# Patient Record
Sex: MALE | Race: BLACK OR AFRICAN AMERICAN | Employment: UNEMPLOYED | URBAN - METROPOLITAN AREA
[De-identification: names, ages, dates, MRNs, and addresses within clinical notes are randomized per-mention and may not be internally consistent; named-entity substitution may affect disease eponyms.]

---

## 2017-01-05 ENCOUNTER — TELEPHONE (OUTPATIENT)
Dept: FAMILY MEDICINE | Facility: CLINIC | Age: 52
End: 2017-01-05

## 2017-01-05 DIAGNOSIS — E11.9 DIABETES MELLITUS TYPE 2, INSULIN DEPENDENT (H): Primary | ICD-10-CM

## 2017-01-05 DIAGNOSIS — Z79.4 DIABETES MELLITUS TYPE 2, INSULIN DEPENDENT (H): Primary | ICD-10-CM

## 2017-01-05 NOTE — TELEPHONE ENCOUNTER
[employer] plans no longer cover Lantus Solostar or vials. They now cover Tresiba or Basaglar Insulins. Please, send new rx for one of the covered medications.    Thank You  Augustina Frey CPhT, [facility]  [phone]  [email or web address]

## 2017-01-06 RX ORDER — INSULIN GLARGINE 100 [IU]/ML
24 INJECTION, SOLUTION SUBCUTANEOUS AT BEDTIME
Qty: 3 ML | Refills: 3 | Status: SHIPPED
Start: 2017-01-06 | End: 2017-03-21

## 2017-01-07 NOTE — TELEPHONE ENCOUNTER
Insulin glargine (solostar) not covered by insurance anymore. Sending a new prescription for Basaglar.    Eloina Garcia MD  [facility],  G2 Family Medicine Resident  #[id number]

## 2017-01-24 ENCOUNTER — OFFICE VISIT (OUTPATIENT)
Dept: FAMILY MEDICINE | Facility: CLINIC | Age: 52
End: 2017-01-24

## 2017-01-24 VITALS
BODY MASS INDEX: 31.14 KG/M2 | RESPIRATION RATE: 18 BRPM | DIASTOLIC BLOOD PRESSURE: 73 MMHG | TEMPERATURE: 98 F | OXYGEN SATURATION: 98 % | HEART RATE: 101 BPM | WEIGHT: 217 LBS | SYSTOLIC BLOOD PRESSURE: 115 MMHG

## 2017-01-24 DIAGNOSIS — L02.818 CUTANEOUS ABSCESS OF OTHER SITE: Primary | ICD-10-CM

## 2017-01-24 RX ORDER — DIPHENHYDRAMINE HCL/ZINC ACET 2 %-0.1 %
1 AEROSOL, SPRAY (GRAM) TOPICAL 2 TIMES DAILY
Qty: 40 CAPSULE | Refills: 0 | Status: SHIPPED | OUTPATIENT
Start: 2017-01-24 | End: 2017-02-13

## 2017-01-24 RX ORDER — CEPHALEXIN 500 MG/1
500 CAPSULE ORAL 3 TIMES DAILY
Qty: 30 CAPSULE | Refills: 0 | Status: SHIPPED | OUTPATIENT
Start: 2017-01-24 | End: 2019-07-01

## 2017-01-24 NOTE — PROGRESS NOTES
HPI:       Don Niño is a 52 year old who presents for the following  Patient presents with:  Diabetes: F/u        Concern: Cyst    Description of the problem :pt presents today with a cyst located on the LRQ of stomach, pt states that he has experienced drainage of puss and blood, warm to touch     When did it start?: 2 months ago     Intensity: mild, moderate    Progression of Symptoms:  same    Therapies Tried hot towel    What has worked?  The hot towel has helped with the bump. No antibiotic so far.     He believes it was provoked by an insulin injection.           , Diabetes Follow-up    Patient is checking blood sugars: three times daily. Results are as follows:         am - 124-130         lunchtime - 180-200              postprandial after supper- 140-160         -Last A1C was A1C      8.0   12/6/2016  A1C      8.8   8/17/2016  A1C      8.8   5/11/2016  A1C      8.2   10/16/2015  A1C      7.4   4/3/2015     Diabetic concerns: None    Chest Pain or exercise related calf pain (claudication):no     Symptoms of hypoglycemia (low blood sugar): dizzy, weak, lethargy, confusion     Paresthesias (numbness or burning in feet) or sores: No   Diabetic eye exam within the last year?: Yes          Problem, Medication and Allergy Lists were reviewed and are current.  Patient is an established patient of this clinic.         Review of Systems:   Review of Systems   All other systems reviewed and are negative.            Physical Exam:   Patient Vitals for the past 24 hrs:   BP Temp Temp src Pulse Resp SpO2 Weight   01/24/17 1058 115/73 mmHg 98  F (36.7  C) Oral 101 18 98 % 217 lb (98.431 kg)     Body mass index is 31.14 kg/(m^2).  Vitals were reviewed and were normal  Blood pressure 115/73, pulse 101, temperature 98  F (36.7  C), temperature source Oral, resp. rate 18, weight 217 lb (98.431 kg), SpO2 98 %.     Physical Exam   Constitutional: He appears well-developed and well-nourished. No distress.    Cardiovascular: Normal rate, regular rhythm and normal heart sounds.    No murmur heard.  Pulmonary/Chest: Effort normal and breath sounds normal. He has no wheezes. He has no rales.   Abdominal:             Results:   None.   Assessment and Plan   1. Subcutaneous abscess, likely 2/2 infected insulin injection.   - use warm compresses as much as possible to provoke sponateous abscess drainage.   - Start cephALEXin (KEFLEX) 500 MG capsule; Take 1 capsule (500 mg) by mouth 3 times daily  Dispense: 30 capsule; Refill: 0  - Start Lactobacillus Rhamnosus, GG, (CVS PROBIOTIC, LACTOBACILLUS,) CAPS; Take 1 capsule by mouth 2 times daily for 20 days  Dispense: 40 capsule; Refill: 0  - follow up in one week.   ------------------------------------------------------------------------------------------  T2DM: controlled, continue current treatment.   Basal: glargine 24 units plus metformin 2000 mg daily.   Prandial: Novolo units with each meal plus 5 units before snacks at night.   ----  BP: Controlled : cont lisinopril 5 mg daily/metoprolol 50 mg /day.   ----  Lipids: Atorvastatin 80 mg daily.       There are no discontinued medications.  Options for treatment and follow-up care were reviewed with the patient. Don Niño  engaged in the decision making process and verbalized understanding of the options discussed and agreed with the final plan.    Jason Hightower MD

## 2017-01-24 NOTE — PATIENT INSTRUCTIONS
Here is the plan from today's visit    1. Cutaneous abscess of other site  - use warm compresses  - cephALEXin (KEFLEX) 500 MG capsule; Take 1 capsule (500 mg) by mouth 3 times daily  Dispense: 30 capsule; Refill: 0  - Lactobacillus Rhamnosus, GG, (CVS PROBIOTIC, LACTOBACILLUS,) CAPS; Take 1 capsule by mouth 2 times daily for 20 days  Dispense: 40 capsule; Refill: 0          Please call or return to clinic if your symptoms don't go away.    Follow up plan      Thank you for coming to [facility] today.  Lab Testing:  **If you had lab testing today and your results are reassuring or normal they will be mailed to you or sent through [employer] within 7 days.   **If the lab tests need quick action we will call you with the results.  The phone number we will call with results is # [phone] (home) . If this is not the best number please call our clinic and change the number.  Medication Refills:  If you need any refills please call your pharmacy and they will contact us.   If you need to  your refill at a new pharmacy, please contact the new pharmacy directly. The new pharmacy will help you get your medications transferred faster.   Scheduling:  If you have any concerns about today's visit or wish to schedule another appointment please call our office during normal business hours [phone] (8-5:00 M-F)  If a referral was made to a [facility] and you don't get a call from central scheduling please call [phone].  If a Mammogram was ordered for you at [facility] call [phone] to schedule or change your appointment.  If you had an XRay/CT/Ultrasound/MRI ordered the number is [phone] to schedule or change your radiology appointment.   Medical Concerns:  If you have urgent medical concerns please call [phone] at any time of the day.  If you have a medical emergency please call 112.

## 2017-01-24 NOTE — MR AVS SNAPSHOT
After Visit Summary   1/24/2017    Don Niño    MRN: [id number]           Patient Information     Date Of Birth          1965        Visit Information        Provider Department      1/24/2017 10:40 AM Jason Hightower MD [facility]        Today's Diagnoses     Cutaneous abscess of other site    -  1       Care Instructions    Here is the plan from today's visit    1. Cutaneous abscess of other site  - use warm compresses  - cephALEXin (KEFLEX) 500 MG capsule; Take 1 capsule (500 mg) by mouth 3 times daily  Dispense: 30 capsule; Refill: 0  - Lactobacillus Rhamnosus, GG, (CVS PROBIOTIC, LACTOBACILLUS,) CAPS; Take 1 capsule by mouth 2 times daily for 20 days  Dispense: 40 capsule; Refill: 0          Please call or return to clinic if your symptoms don't go away.    Follow up plan      Thank you for coming to [facility] today.  Lab Testing:  **If you had lab testing today and your results are reassuring or normal they will be mailed to you or sent through [employer] within 7 days.   **If the lab tests need quick action we will call you with the results.  The phone number we will call with results is # [phone] (home) . If this is not the best number please call our clinic and change the number.  Medication Refills:  If you need any refills please call your pharmacy and they will contact us.   If you need to  your refill at a new pharmacy, please contact the new pharmacy directly. The new pharmacy will help you get your medications transferred faster.   Scheduling:  If you have any concerns about today's visit or wish to schedule another appointment please call our office during normal business hours [phone] (8-5:00 M-F)  If a referral was made to a [facility] and you don't get a call from central scheduling please call [phone].  If a Mammogram was ordered for you at [facility] call [phone] to schedule or change  your appointment.  If you had an XRay/CT/Ultrasound/MRI ordered the number is [phone] to schedule or change your radiology appointment.   Medical Concerns:  If you have urgent medical concerns please call [phone] at any time of the day.  If you have a medical emergency please call 911.          Follow-ups after your visit        Your next 10 appointments already scheduled     Jan 25, 2017 11:00 AM   (Arrive by 10:45 AM)   New Patient Visit with Deana Ashby RD   [facility] ([facility])    [address]  [address] [facility] [address] [phone]            Feb 02, 2017  5:00 PM   (Arrive by 4:45 PM)   Return Visit with Alexei Marquez MD   [facility] ([facility])    [address]  [address] [facility] [address] [phone]              Who to contact     Please call your clinic at [phone] to:    Ask questions about your health    Make or cancel appointments    Discuss your medicines    Learn about your test results    Speak to your doctor   If you have compliments or concerns about an experience at your clinic, or if you wish to file a complaint, please contact [facility] Patient Relations at [phone] or email us at [email or web address]         Additional Information About Your Visit        MyChart Information     [employer] is an electronic gateway that provides easy, online access to your medical records. With [employer], you can request a clinic appointment, read your test results, renew a prescription or communicate with your care team.     To sign up for [employer] visit the website at [email or web address]   You will be asked to enter the access code listed below, as well as some personal information. Please follow the directions to create your username and password.     Your access code is: [email or web address]  Expires: 3/6/2017 10:58  AM     Your access code will  in 90 days. If you need help or a new code, please contact your [facility] or call [phone] for assistance.        Care EveryWhere ID     This is your Care EveryWhere ID. This could be used by other organizations to access your [place] medical records  [id number]        Your Vitals Were     Pulse Temperature Respirations Pulse Oximetry          101 98  F (36.7  C) (Oral) 18 98%         Blood Pressure from Last 3 Encounters:   17 115/73   16 116/75   16 118/75    Weight from Last 3 Encounters:   17 217 lb (98.431 kg)   16 216 lb 3.2 oz (98.068 kg)   16 216 lb 6.4 oz (98.158 kg)              Today, you had the following     No orders found for display         Today's Medication Changes          These changes are accurate as of: 17 11:54 AM.  If you have any questions, ask your nurse or doctor.               Start taking these medicines.        Dose/Directions    cephALEXin 500 MG capsule   Commonly known as:  KEFLEX   Used for:  Cutaneous abscess of other site   Started by:  Jason Hightower MD        Dose:  500 mg   Take 1 capsule (500 mg) by mouth 3 times daily   Quantity:  30 capsule   Refills:  0       CVS PROBIOTIC (LACTOBACILLUS) Caps   Used for:  Cutaneous abscess of other site   Started by:  Jason Hightower MD        Dose:  1 capsule   Take 1 capsule by mouth 2 times daily for 20 days   Quantity:  40 capsule   Refills:  0            Where to get your medicines      These medications were sent to [facility] [place], [place] - [address] [address]     Phone:  [phone]    - cephALEXin 500 MG capsule  - CVS PROBIOTIC (LACTOBACILLUS) Caps             Primary Care Provider Office Phone # Fax #    Eloina Garcia [phone]       [facility] [address]   [facility] [address]        Thank you!     Thank you  for choosing \[facility]\"" [facility]  for your care. Our goal is always to provide you with excellent care. Hearing back from our patients is one way we can continue to improve our services. Please take a few minutes to complete the written survey that you may receive in the mail after your visit with us. Thank you!             Your Updated Medication List - Protect others around you: Learn how to safely use, store and throw away your medicines at www.disposemymeds.org.          This list is accurate as of: 1/24/17 11:54 AM.  Always use your most recent med list.                   Brand Name Dispense Instructions for use    aspirin 81 MG tablet     90 tablet    Take 1 tablet (81 mg) by mouth daily       atorvastatin 80 MG tablet    LIPITOR    90 tablet    Take 1 tablet (80 mg) by mouth daily       blood glucose monitoring meter device kit     1 kit    Use to test blood sugars 3 times daily or as directed.       * blood glucose monitoring test strip    EUGENE CONTOUR    1 Box    1 strip by In Vitro route 3 times daily       * blood glucose monitoring test strip    no brand specified    1 Box    by In Vitro route daily       * blood glucose monitoring test strip    EUGENE CONTOUR NEXT    100 each    Use to test blood sugar 3 times daily or as directed.       cephALEXin 500 MG capsule    KEFLEX    30 capsule    Take 1 capsule (500 mg) by mouth 3 times daily       CVS PROBIOTIC (LACTOBACILLUS) Caps     40 capsule    Take 1 capsule by mouth 2 times daily for 20 days       insulin aspart 100 UNIT/ML injection    NovoLOG FLEXPEN    15 mL    14 units before meals and 5 units after late night snack       insulin glargine 100 UNIT/ML injection     3 mL    Inject 24 Units Subcutaneous At Bedtime       insulin pen needle 31G X 8 MM    B-D U/F    100 each    Use 3 daily or as directed.       lancets 28G Misc     100 each    3 Application 3 times daily       lisinopril 5 MG tablet    PRINIVIL/ZESTRIL    90 tablet    Take 1  tablet (5 mg) by mouth daily       loratadine 10 MG capsule    CLARITIN    30 capsule    Take 10 mg by mouth daily       metFORMIN 500 MG 24 hr tablet    GLUCOPHAGE-XR    120 tablet    Take 4 tablets (2,000 mg) by mouth daily       metoprolol 50 MG 24 hr tablet    TOPROL-XL    30 tablet    Take 1 tablet (50 mg) by mouth daily       nicotine polacrilex 2 MG gum    CVS NICOTINE POLACRILEX    30 tablet    Place 1 each (2 mg) inside cheek as needed for smoking cessation       nitroglycerin 0.4 MG sublingual tablet    NITROSTAT    20 tablet    Place 1 tablet (0.4 mg) under the tongue every 5 minutes as needed for chest pain       SM HEATING PAD Pads     1 each    1 Box nightly as needed       * Notice:  This list has 3 medication(s) that are the same as other medications prescribed for you. Read the directions carefully, and ask your doctor or other care provider to review them with you.

## 2017-01-25 PROBLEM — L08.9 SKIN INFECTION, BACTERIAL: Status: ACTIVE | Noted: 2017-01-25

## 2017-01-25 PROBLEM — B96.89 SKIN INFECTION, BACTERIAL: Status: ACTIVE | Noted: 2017-01-25

## 2017-01-31 ENCOUNTER — PRE VISIT (OUTPATIENT)
Dept: CARDIOLOGY | Facility: CLINIC | Age: 52
End: 2017-01-31

## 2017-01-31 NOTE — TELEPHONE ENCOUNTER
Pre visit nursing summary    HPI: Don Niño is here for his annual f/u for CAD.    Labs: 12/6/2016   Ref. Range 12/6/2016 11:05   Sodium Latest Ref Range: 133-144 mmol/L 140   Potassium Latest Ref Range: 3.4-5.3 mmol/L 4.6   Chloride Latest Ref Range:  mmol/L 106   Carbon Dioxide Latest Ref Range: 20-32 mmol/L 30   Urea Nitrogen Latest Ref Range: 7-30 mg/dL 7   Creatinine Latest Ref Range: 0.66-1.25 mg/dL 0.82   GFR Estimate Latest Ref Range: >60 mL/min/1.7m2 >90...   GFR Estimate If Black Latest Ref Range: >60 mL/min/1.7m2 >90...   Calcium Latest Ref Range: 8.5-10.1 mg/dL 8.7   Anion Gap Latest Ref Range: 3-14 mmol/L 5   Hemoglobin A1C Latest Ref Range: 4.3-6.0 % 8.0 (H)   Cholesterol Latest Ref Range: <200 mg/dL 127   HDL Cholesterol Latest Ref Range: >39 mg/dL 39 (L)   LDL Cholesterol Calculated Latest Ref Range: <100 mg/dL 68   Non HDL Cholesterol Latest Ref Range: <130 mg/dL 88   Triglycerides Latest Ref Range: <150 mg/dL 98   Glucose Latest Ref Range: 70-99 mg/dL 156 (H)

## 2017-02-03 DIAGNOSIS — F17.210 CIGARETTE NICOTINE DEPENDENCE WITHOUT COMPLICATION: Primary | ICD-10-CM

## 2017-02-03 NOTE — TELEPHONE ENCOUNTER
Drug: NICOTINE 2MG GUM  Last Fill Date: 05/11/16  Last Fill Quantity: 110  Last Office Visit: 01/24/17    THANKS    Jocelyn Lugo CPhT   [facility]   Phone [phone]

## 2017-02-15 DIAGNOSIS — I10 ESSENTIAL HYPERTENSION WITH GOAL BLOOD PRESSURE LESS THAN 140/90: ICD-10-CM

## 2017-02-15 DIAGNOSIS — E11.9 TYPE 2 DIABETES MELLITUS WITHOUT COMPLICATION, WITH LONG-TERM CURRENT USE OF INSULIN (H): ICD-10-CM

## 2017-02-15 DIAGNOSIS — Z79.4 TYPE 2 DIABETES MELLITUS WITHOUT COMPLICATION, WITH LONG-TERM CURRENT USE OF INSULIN (H): ICD-10-CM

## 2017-02-15 DIAGNOSIS — I21.4 NON-ST ELEVATION (NSTEMI) MYOCARDIAL INFARCTION (H): ICD-10-CM

## 2017-02-15 RX ORDER — LISINOPRIL 5 MG/1
5 TABLET ORAL DAILY
Qty: 90 TABLET | Refills: 3 | Status: SHIPPED
Start: 2017-02-15 | End: 2017-11-15

## 2017-02-15 NOTE — TELEPHONE ENCOUNTER
Lisinopril 5mg Tablets      Last Written Prescription Date: 11/14/16  Last Fill Quantity: 90,  # refills: 0   Last Office Visit with [facility], [facility] or [facility] prescribing provider: 01/24/17    Thank You  Augustina Frey CPhT, [place] [facility] Technician  [phone]  [email or web address]

## 2017-02-17 ENCOUNTER — OFFICE VISIT (OUTPATIENT)
Dept: FAMILY MEDICINE | Facility: CLINIC | Age: 52
End: 2017-02-17

## 2017-02-17 VITALS
DIASTOLIC BLOOD PRESSURE: 74 MMHG | SYSTOLIC BLOOD PRESSURE: 131 MMHG | BODY MASS INDEX: 31.28 KG/M2 | WEIGHT: 218 LBS | OXYGEN SATURATION: 95 % | RESPIRATION RATE: 18 BRPM | TEMPERATURE: 97.5 F | HEART RATE: 101 BPM

## 2017-02-17 DIAGNOSIS — Z00.00 PREVENTATIVE HEALTH CARE: Primary | ICD-10-CM

## 2017-02-17 ASSESSMENT — ENCOUNTER SYMPTOMS
FREQUENCY: 0
DYSURIA: 0
LIGHT-HEADEDNESS: 0
HEADACHES: 0
NUMBNESS: 0
SHORTNESS OF BREATH: 0
CHILLS: 0
FEVER: 0
ABDOMINAL PAIN: 0

## 2017-02-17 NOTE — PATIENT INSTRUCTIONS
Plan:   Basal: Lantus: 24 units at night. Fasting goal < 120   Prandial(food) : 14 units with lunch and dinner and 5 units with snacks. Goal is < 180 two hours after meal.   Correctional: 1 unit x 50 of glucose above 150.   For instance:   Between 151-199 --> 1 unit.                  200- 249 -> 2 units                  250-299 --> 3 units                  300-349->   4 units                  350- 400 -->5 units                   > 400 go to the ER.       Here is the plan from today's visit    There are no diagnoses linked to this encounter.    Please call or return to clinic if your symptoms don't go away.    Follow up plan  judi you for coming to [facility] today.  Lab Testing:  **If you had lab testing today and your results are reassuring or normal they will be mailed to you or sent through [employer] within 7 days.   **If the lab tests need quick action we will call you with the results.  The phone number we will call with results is # [phone] (home) . If this is not the best number please call our clinic and change the number.  Medication Refills:  If you need any refills please call your pharmacy and they will contact us.   If you need to  your refill at a new pharmacy, please contact the new pharmacy directly. The new pharmacy will help you get your medications transferred faster.   Scheduling:  If you have any concerns about today's visit or wish to schedule another appointment please call our office during normal business hours [phone] (8-5:00 M-F)  If a referral was made to a [facility] and you don't get a call from central scheduling please call [phone].  If a Mammogram was ordered for you at [facility] call [phone] to schedule or change your appointment.  If you had an XRay/CT/Ultrasound/MRI ordered the number is [phone] to schedule or change your radiology appointment.   Medical Concerns:  If you have urgent medical concerns please call  [phone] at any time of the day.

## 2017-02-17 NOTE — MR AVS SNAPSHOT
After Visit Summary   2/17/2017    Don Niño    MRN: [id number]           Patient Information     Date Of Birth          1965        Visit Information        Provider Department      2/17/2017 2:40 PM Jason Hightower MD [facility]        Care Instructions    Plan:   Basal: Lantus: 24 units at night. Fasting goal < 120   Prandial(food) : 14 units with lunch and dinner and 5 units with snacks. Goal is < 180 two hours after meal.   Correctional: 1 unit x 50 of glucose above 150.   For instance:   Between 151-199 --> 1 unit.                  200- 249 -> 2 units                  250-299 --> 3 units                  300-349->   4 units                  350- 400 -->5 units                   > 400 go to the ER.       Here is the plan from today's visit    There are no diagnoses linked to this encounter.    Please call or return to clinic if your symptoms don't go away.    Follow up plan  judi you for coming to [facility] today.  Lab Testing:  **If you had lab testing today and your results are reassuring or normal they will be mailed to you or sent through MyMedMatch within 7 days.   **If the lab tests need quick action we will call you with the results.  The phone number we will call with results is # [phone] (home) . If this is not the best number please call our clinic and change the number.  Medication Refills:  If you need any refills please call your pharmacy and they will contact us.   If you need to  your refill at a new pharmacy, please contact the new pharmacy directly. The new pharmacy will help you get your medications transferred faster.   Scheduling:  If you have any concerns about today's visit or wish to schedule another appointment please call our office during normal business hours [phone] (8-5:00 M-F)  If a referral was made to a [facility] and you don't get a call from central scheduling please call  [phone].  If a Mammogram was ordered for you at [facility] call [phone] to schedule or change your appointment.  If you had an XRay/CT/Ultrasound/MRI ordered the number is [phone] to schedule or change your radiology appointment.   Medical Concerns:  If you have urgent medical concerns please call [phone] at any time of the day.            Follow-ups after your visit        Who to contact     Please call your clinic at [phone] to:    Ask questions about your health    Make or cancel appointments    Discuss your medicines    Learn about your test results    Speak to your doctor   If you have compliments or concerns about an experience at your clinic, or if you wish to file a complaint, please contact [facility] Patient Relations at [phone] or email us at [email or web address]         Additional Information About Your Visit        [employer] Information     SmartShoot is an electronic gateway that provides easy, online access to your medical records. With SmartShoot, you can request a clinic appointment, read your test results, renew a prescription or communicate with your care team.     To sign up for SmartShoot visit the website at [email or web address]   You will be asked to enter the access code listed below, as well as some personal information. Please follow the directions to create your username and password.     Your access code is: [email or web address]  Expires: 3/6/2017 10:58 AM     Your access code will  in 90 days. If you need help or a new code, please contact your [facility] or call [phone] for assistance.        Care EveryWhere ID     This is your Care EveryWhere ID. This could be used by other organizations to access your [place] medical records  [id number]        Your Vitals Were     Pulse Temperature Respirations Pulse Oximetry BMI (Body Mass Index)       101 97.5  F (36.4  C) (Oral) 18 95% 31.28  kg/m2        Blood Pressure from Last 3 Encounters:   02/17/17 131/74   01/24/17 115/73   12/06/16 116/75    Weight from Last 3 Encounters:   02/17/17 218 lb (98.9 kg)   01/24/17 217 lb (98.4 kg)   12/06/16 216 lb 3.2 oz (98.1 kg)              Today, you had the following     No orders found for display       Primary Care Provider Office Phone # Fax #    Eloina Garcia [phone]       [facility] [address] [facility] [address]        Thank you!     Thank you for choosing [facility]  for your care. Our goal is always to provide you with excellent care. Hearing back from our patients is one way we can continue to improve our services. Please take a few minutes to complete the written survey that you may receive in the mail after your visit with us. Thank you!             Your Updated Medication List - Protect others around you: Learn how to safely use, store and throw away your medicines at www.disposemymeds.org.          This list is accurate as of: 2/17/17  3:39 PM.  Always use your most recent med list.                   Brand Name Dispense Instructions for use    aspirin 81 MG tablet     90 tablet    Take 1 tablet (81 mg) by mouth daily       atorvastatin 80 MG tablet    LIPITOR    90 tablet    Take 1 tablet (80 mg) by mouth daily       blood glucose monitoring meter device kit     1 kit    Use to test blood sugars 3 times daily or as directed.       * blood glucose monitoring test strip    EUGENE CONTOUR    1 Box    1 strip by In Vitro route 3 times daily       * blood glucose monitoring test strip    no brand specified    1 Box    by In Vitro route daily       * blood glucose monitoring test strip    EUGENE CONTOUR NEXT    100 each    Use to test blood sugar 3 times daily or as directed.       cephALEXin 500 MG capsule    KEFLEX    30 capsule    Take 1 capsule (500 mg) by mouth 3 times daily       insulin aspart 100 UNIT/ML injection    NovoLOG  FLEXPEN    15 mL    14 units before meals and 5 units after late night snack       insulin glargine 100 UNIT/ML injection     3 mL    Inject 24 Units Subcutaneous At Bedtime       insulin pen needle 31G X 8 MM    B-D U/F    100 each    Use 3 daily or as directed.       lancets 28G Misc     100 each    3 Application 3 times daily       lisinopril 5 MG tablet    PRINIVIL/ZESTRIL    90 tablet    Take 1 tablet (5 mg) by mouth daily       loratadine 10 MG capsule    CLARITIN    30 capsule    Take 10 mg by mouth daily       metFORMIN 500 MG 24 hr tablet    GLUCOPHAGE-XR    120 tablet    Take 4 tablets (2,000 mg) by mouth daily       metoprolol 50 MG 24 hr tablet    TOPROL-XL    30 tablet    Take 1 tablet (50 mg) by mouth daily       nicotine polacrilex 2 MG gum    [employer] NICOTINE POLACRILEX    30 tablet    Place 1 each (2 mg) inside cheek as needed for smoking cessation       nitroglycerin 0.4 MG sublingual tablet    NITROSTAT    20 tablet    Place 1 tablet (0.4 mg) under the tongue every 5 minutes as needed for chest pain       SM HEATING PAD Pads     1 each    1 Box nightly as needed       * Notice:  This list has 3 medication(s) that are the same as other medications prescribed for you. Read the directions carefully, and ask your doctor or other care provider to review them with you.

## 2017-02-17 NOTE — PROGRESS NOTES
HPI:       Don Niño is a 52 year old who presents for the following  Patient presents with:  Diabetes: 2 week f/u      Diabetes Follow-up    Patient is checking blood sugars: twice daily.    Blood sugar testing frequency justification: Adjustment of medication(s)  Results are as follows:         am - 140 mg/dl                       bedtime - >200         -Last A1C was   Lab Results   Component Value Date    A1C 8.0 12/06/2016    A1C 8.8 08/17/2016    A1C 8.8 05/11/2016    A1C 8.2 10/16/2015    A1C 7.4 04/03/2015        Diabetic concerns: None    Chest Pain or exercise related calf pain (claudication):no     Symptoms of hypoglycemia (low blood sugar): none     Paresthesias (numbness or burning in feet) or sores: No   Diabetic eye exam within the last year?: Yes      Adherence and Exercise  Medication side effects: not asked  How often is a medication missed? Never  Are you able to follow the treatment plan? Yes  Exercise:walking  2-3 days/week for an average of 15-30 minutes     Subcutaneous abscess noted on 1/24/17 resolving. No draining.    Problem, Medication and Allergy Lists were reviewed and are current.  Patient is an established patient of this clinic.         Review of Systems:   Review of Systems   Constitutional: Negative for chills and fever.   Eyes: Negative for visual disturbance.   Respiratory: Negative for shortness of breath.    Cardiovascular: Negative for chest pain.   Gastrointestinal: Negative for abdominal pain.   Genitourinary: Negative for dysuria and frequency.   Neurological: Negative for light-headedness, numbness and headaches.             Physical Exam:   Patient Vitals for the past 24 hrs:   BP Temp Temp src Pulse Resp SpO2 Weight   02/17/17 1451 131/74 97.5  F (36.4  C) Oral 101 18 95 % 218 lb (98.9 kg)     Body mass index is 31.28 kg/(m^2).  Vitals were reviewed and were normal     Physical Exam   Constitutional: He is oriented to person, place, and time. He appears  well-developed and well-nourished. No distress.   HENT:   Head: Normocephalic and atraumatic.   Eyes: Conjunctivae are normal. No scleral icterus.   Cardiovascular: Normal rate, regular rhythm and normal heart sounds.    Pulmonary/Chest: Effort normal. No respiratory distress. He has no wheezes. He has no rales.   Abdominal: Soft. There is no tenderness.       Neurological: He is alert and oriented to person, place, and time.   Skin: Skin is warm and dry.   Psychiatric: He has a normal mood and affect. His behavior is normal.         Results:     Assessment and Plan   #. Insulin dependent DM: Chronic, stable. Poor controlled, likely due to poor adherence.   - Life style modification: Recommended, also recommended rotate insulin injection sites.   - BP at goal < 130/80. Continue Metoprolol 50 mg/day; lisinopril 5 mg/day  - Metformin: Cont 2000 mg daily.   - Statins: Cont 80 mg daily.   - Blood glucose, see below:   Basal: Lantus: 24 units at night. Fasting goal < 120   Prandial(food) : 14 units with lunch and dinner and 5 units with snacks. Goal is < 180 two hours after meal.   Correctional: 1 unit x 50 of glucose above 150.   For instance:   Between 151-199 --> 1 unit.                  200- 249 -> 2 units                  250-299 --> 3 units                  300-349->   4 units                  350- 400 -->5 units                   > 400 go to the ER.     - Diabetes driving safety forms signed and placed at the .     #. Subcutaneous insulin site abscess, resolved.   - 3 cm area of firmness. No drainage, redness, warmth. Non fluctuant. Appears to be resolving.   - patient instructed to rotate injection sites.       There are no discontinued medications.  Options for treatment and follow-up care were reviewed with the patient. Don Niño  engaged in the decision making process and verbalized understanding of the options discussed and agreed with the final plan.    Daniele VALENCIA MS3, am serving as a  jf on behalf of Dr. Jason Hightower.     Jason Hightower MD

## 2017-02-21 PROBLEM — L08.9 SKIN INFECTION, BACTERIAL: Status: RESOLVED | Noted: 2017-01-25 | Resolved: 2017-02-21

## 2017-02-21 PROBLEM — B96.89 SKIN INFECTION, BACTERIAL: Status: RESOLVED | Noted: 2017-01-25 | Resolved: 2017-02-21

## 2017-03-21 ENCOUNTER — TELEPHONE (OUTPATIENT)
Dept: FAMILY MEDICINE | Facility: CLINIC | Age: 52
End: 2017-03-21

## 2017-03-21 DIAGNOSIS — E11.9 DIABETES MELLITUS TYPE 2, INSULIN DEPENDENT (H): ICD-10-CM

## 2017-03-21 DIAGNOSIS — Z79.4 DIABETES MELLITUS TYPE 2, INSULIN DEPENDENT (H): ICD-10-CM

## 2017-03-21 DIAGNOSIS — Z79.4 DIABETES MELLITUS DUE TO UNDERLYING CONDITION WITH HYPEROSMOLARITY WITHOUT COMA, WITH LONG-TERM CURRENT USE OF INSULIN (H): Primary | ICD-10-CM

## 2017-03-21 DIAGNOSIS — E08.00 DIABETES MELLITUS DUE TO UNDERLYING CONDITION WITH HYPEROSMOLARITY WITHOUT COMA, WITH LONG-TERM CURRENT USE OF INSULIN (H): Primary | ICD-10-CM

## 2017-03-21 RX ORDER — INSULIN GLARGINE 100 [IU]/ML
24 INJECTION, SOLUTION SUBCUTANEOUS AT BEDTIME
Qty: 3 ML | Refills: 3 | Status: SHIPPED
Start: 2017-03-21 | End: 2017-08-31

## 2017-03-21 RX ORDER — BLOOD-GLUCOSE METER
EACH MISCELLANEOUS
Qty: 100 EACH | Refills: 11 | Status: SHIPPED
Start: 2017-03-21 | End: 2018-06-18

## 2017-03-21 NOTE — TELEPHONE ENCOUNTER
Patient's insurance no longer covers siva testing supplies.  Please send prescriptions for new ONETOUCH Ultra testing supplies includin) Glucometer (onetouch ultra)  2) test strips (onetouch ultra)  3) lancets (onetouch delica)    Thank you,  Yamile Licona, PharmD  [facility]

## 2017-03-22 NOTE — TELEPHONE ENCOUNTER
Prescriptions for new ONETOUCH Ultra testing supplies including Glucometer (onetouch ultra), test strips (onetouch ultra and lancets (onetouch delica) sent as requested by Yamile Licona PharmD ([facility]).     Eloina Garcia MD  [facility],  G2 Family Medicine Resident  #[id number]

## 2017-04-20 DIAGNOSIS — I10 BENIGN ESSENTIAL HYPERTENSION: ICD-10-CM

## 2017-04-20 NOTE — TELEPHONE ENCOUNTER
Metoprolol Succinate ER 50mg Tablet      Last Written Prescription Date: 12/06/16  Last Fill Quantity: 30,  # refills: 3   Last Office Visit with [facility], [facility] or [facility] prescribing provider: 02/17/17    Thank You  Augustina Frey CPhT, [place] [facility] Technician  [phone]  [email or web address]

## 2017-04-21 RX ORDER — METOPROLOL SUCCINATE 50 MG/1
50 TABLET, EXTENDED RELEASE ORAL DAILY
Qty: 30 TABLET | Refills: 3 | Status: SHIPPED | OUTPATIENT
Start: 2017-04-21 | End: 2017-08-18

## 2017-06-20 DIAGNOSIS — J30.1 SEASONAL ALLERGIC RHINITIS DUE TO POLLEN: ICD-10-CM

## 2017-06-20 DIAGNOSIS — Z79.4 TYPE 2 DIABETES MELLITUS WITHOUT COMPLICATION, WITH LONG-TERM CURRENT USE OF INSULIN (H): ICD-10-CM

## 2017-06-20 DIAGNOSIS — E11.9 TYPE 2 DIABETES MELLITUS WITHOUT COMPLICATION, WITH LONG-TERM CURRENT USE OF INSULIN (H): ICD-10-CM

## 2017-06-20 RX ORDER — METFORMIN HCL 500 MG
2000 TABLET, EXTENDED RELEASE 24 HR ORAL DAILY
Qty: 120 TABLET | Refills: 3 | Status: SHIPPED | OUTPATIENT
Start: 2017-06-20 | End: 2017-10-12

## 2017-06-20 RX ORDER — LORATADINE 10 MG/1
10 CAPSULE, LIQUID FILLED ORAL DAILY
Qty: 30 CAPSULE | Refills: 3 | Status: SHIPPED | OUTPATIENT
Start: 2017-06-20 | End: 2018-11-19

## 2017-08-18 DIAGNOSIS — I10 BENIGN ESSENTIAL HYPERTENSION: ICD-10-CM

## 2017-08-18 RX ORDER — METOPROLOL SUCCINATE 50 MG/1
50 TABLET, EXTENDED RELEASE ORAL DAILY
Qty: 30 TABLET | Refills: 3 | Status: SHIPPED | OUTPATIENT
Start: 2017-08-18 | End: 2017-12-15

## 2017-08-18 NOTE — TELEPHONE ENCOUNTER
Metoprolol Succinate ER 50mg Tablets      Last Written Prescription Date: 04/21/17  Last Fill Quantity: 30,  # refills: 3   Last Office Visit with [facility], [facility] or [facility] prescribing provider: 02/17/17    Thank You  Augustina Frey CPhT, [place] Pharm[facility] Technician  [phone]  [email or web address]

## 2017-08-31 DIAGNOSIS — Z79.4 DIABETES MELLITUS TYPE 2, INSULIN DEPENDENT (H): ICD-10-CM

## 2017-08-31 DIAGNOSIS — E11.9 DIABETES MELLITUS TYPE 2, INSULIN DEPENDENT (H): ICD-10-CM

## 2017-08-31 NOTE — TELEPHONE ENCOUNTER
Basaglar      Last Written Prescription Date: 3/21/17  Last Fill Quantity: 15,  # refills: 3   Last Office Visit with [facility], [facility] or [facility] prescribing provider: 2/17/17                                                 Thank you,  Ida Vazquez CPhT  On behalf of [facility]

## 2017-09-01 RX ORDER — INSULIN GLARGINE 100 [IU]/ML
24 INJECTION, SOLUTION SUBCUTANEOUS AT BEDTIME
Qty: 3 ML | Refills: 3 | Status: SHIPPED | OUTPATIENT
Start: 2017-09-01 | End: 2018-05-16

## 2017-09-06 ENCOUNTER — PATIENT OUTREACH (OUTPATIENT)
Dept: FAMILY MEDICINE | Facility: CLINIC | Age: 52
End: 2017-09-06

## 2017-10-12 DIAGNOSIS — Z79.4 TYPE 2 DIABETES MELLITUS WITHOUT COMPLICATION, WITH LONG-TERM CURRENT USE OF INSULIN (H): ICD-10-CM

## 2017-10-12 DIAGNOSIS — E11.9 TYPE 2 DIABETES MELLITUS WITHOUT COMPLICATION, WITH LONG-TERM CURRENT USE OF INSULIN (H): ICD-10-CM

## 2017-10-12 RX ORDER — METFORMIN HCL 500 MG
2000 TABLET, EXTENDED RELEASE 24 HR ORAL DAILY
Qty: 120 TABLET | Refills: 3 | Status: SHIPPED | OUTPATIENT
Start: 2017-10-12 | End: 2018-02-13

## 2017-10-12 NOTE — TELEPHONE ENCOUNTER
BD Pen Needle Short 31G X 8 MM      Last Written Prescription Date: 03/28/16  Last Fill Quantity: 100,  # refills: 98   Last Office Visit with [facility] or [facility] prescribing provider: 02/17/17                                                 Novolog Flexpen      Last Written Prescription Date: 06/20/17  Last Fill Quantity: 15,  # refills: 3   Last Office Visit with [facility] or [facility] prescribing provider: 02/17/17                                                 Metformin ER 500mg Tablets      Last Written Prescription Date: 06/20/17  Last Fill Quantity: 120,  # refills: 3   Last Office Visit with [facility], [facility] or [facility] prescribing provider: 02/17/17    Thank You  Augustina Frey CPhT, [place] [facility] Technician  [phone]  [email or web address]

## 2017-11-15 ENCOUNTER — TELEPHONE (OUTPATIENT)
Dept: FAMILY MEDICINE | Facility: CLINIC | Age: 52
End: 2017-11-15

## 2017-11-15 DIAGNOSIS — Z79.4 TYPE 2 DIABETES MELLITUS WITHOUT COMPLICATION, WITH LONG-TERM CURRENT USE OF INSULIN (H): ICD-10-CM

## 2017-11-15 DIAGNOSIS — E11.9 TYPE 2 DIABETES MELLITUS WITHOUT COMPLICATION, WITH LONG-TERM CURRENT USE OF INSULIN (H): ICD-10-CM

## 2017-11-15 DIAGNOSIS — I10 ESSENTIAL HYPERTENSION WITH GOAL BLOOD PRESSURE LESS THAN 140/90: ICD-10-CM

## 2017-11-15 DIAGNOSIS — I21.4 NON-ST ELEVATION (NSTEMI) MYOCARDIAL INFARCTION (H): ICD-10-CM

## 2017-11-15 RX ORDER — LISINOPRIL 5 MG/1
5 TABLET ORAL DAILY
Qty: 90 TABLET | Refills: 3 | Status: SHIPPED | OUTPATIENT
Start: 2017-11-15 | End: 2018-11-16

## 2017-11-15 NOTE — TELEPHONE ENCOUNTER
PRIOR AUTHORIZATION FOR   Drug: Basalgar Kwikpen   Insurance: [place] MEDICAID  ID Number: [id number]  BIN Number: [id number]  PCN Number:   Group Number:   Phone Number: [phone]    Please notify pharmacy if Prior Auth is approved or denied

## 2017-11-15 NOTE — TELEPHONE ENCOUNTER
NEED NEW PRESCRIPTIONS THAT HAS A PRECEPTOR WHOSE NPI NUMBER IS UNDER [place], THE PCP FIDENCIO NOT UNDER [place]     REFILL REQUEST FOR TEST STRIPS    Last Written Prescription Date: 10/12/17  Last Fill Quantity: 100, # refills: 12    Last Office Visit with [facility] or [facility] prescribing provider:  02/17/17  WOULD NEED A NEW METER AND LANCETS PER [place] INSURANCE     REFILL REQUEST FOR ASPIRIN     Last Written Prescription Date: 10/12/17  Last Fill Quantity: 30, # refills: 12    Last Office Visit with [facility], [facility] or [facility] prescribing provider:  02/17/17    REFILL REQUEST FOR LISINOPRIL  Last Written Prescription Date: 10/12/17  Last Fill Quantity: 30, # refills: 12    Last Office Visit with [facility], [facility] or [facility] prescribing provider: 02/17/17

## 2017-11-20 NOTE — TELEPHONE ENCOUNTER
Prior Authorization Retail Medication Request  Medication/Dose: For Betty Castro   Diagnosis and ICD code: E11.9,Z79.4  New/Renewal/Insurance Change PA: SEE CHART  Previously Tried and Failed Therapies: SEE CHART    Insurance ID (if provided): SEE CHART  Insurance Phone (if provided): SEE CHART    Any additional info from fax request:     If you received a fax notification from an outside Pharmacy:  Pharmacy Name:[facility]   Pharmacy #:[phone]  Pharmacy Fax:[phone]

## 2017-11-20 NOTE — TELEPHONE ENCOUNTER
[facility] Prior Authorization Team   Phone: [phone]  Fax: [phone]    PA Initiation    Medication: For Basalgar Kwikpen-Initiated-  Insurance Company: [place] Medicaid ([facility]) - Phone [phone] Fax [phone]  Pharmacy Filling the Rx: [place], [place] - [address]  Filling Pharmacy Phone: [phone]  Filling Pharmacy Fax: [phone]  Start Date: 11/20/2017

## 2017-11-21 NOTE — TELEPHONE ENCOUNTER
Prior Authorization Approval    Authorization Effective Date: 11/20/2017  Authorization Expiration Date: 11/30/2017  Medication: For Betty Abadpen-Initiated-Approved-  Approved Dose/Quantity:   Reference #:     Insurance Company: [place] Medicaid ([facility]) - Phone [phone] Fax [phone]  Expected CoPay: $3.00     CoPay Card Available:      Foundation Assistance Needed:    Which Pharmacy is filling the prescription (Not needed for infusion/clinic administered): [facility] [place], [place] - [address]   Pharmacy Notified: Yes  Patient Notified: Yes

## 2017-12-14 ENCOUNTER — ALLIED HEALTH/NURSE VISIT (OUTPATIENT)
Dept: NURSING | Facility: CLINIC | Age: 52
End: 2017-12-14
Payer: COMMERCIAL

## 2017-12-14 DIAGNOSIS — Z23 NEED FOR PROPHYLACTIC VACCINATION AND INOCULATION AGAINST INFLUENZA: Primary | ICD-10-CM

## 2017-12-14 PROCEDURE — 90471 IMMUNIZATION ADMIN: CPT

## 2017-12-14 PROCEDURE — 90686 IIV4 VACC NO PRSV 0.5 ML IM: CPT | Mod: SL

## 2017-12-14 PROCEDURE — 99207 ZZC NO CHARGE NURSE ONLY: CPT

## 2017-12-14 NOTE — MR AVS SNAPSHOT
"              After Visit Summary   2017    Don Niño    MRN: [id number]           Patient Information     Date Of Birth          1965        Visit Information        Provider Department      2017 11:30 AM CARE COORDINATOR [facility]        Today's Diagnoses     Need for prophylactic vaccination and inoculation against influenza    -  1       Follow-ups after your visit        Who to contact     If you have questions or need follow up information about today's clinic visit or your schedule please contact [facility] directly at [phone].  Normal or non-critical lab and imaging results will be communicated to you by [employer], letter or phone within 4 business days after the clinic has received the results. If you do not hear from us within 7 days, please contact the clinic through [employer] or phone. If you have a critical or abnormal lab result, we will notify you by phone as soon as possible.  Submit refill requests through [employer] or call your pharmacy and they will forward the refill request to us. Please allow 3 business days for your refill to be completed.          Additional Information About Your Visit        MyChart Information     [employer] lets you send messages to your doctor, view your test results, renew your prescriptions, schedule appointments and more. To sign up, go to [email or web address] . Click on \"Log in\" on the left side of the screen, which will take you to the Welcome page. Then click on \"Sign up Now\" on the right side of the page.     You will be asked to enter the access code listed below, as well as some personal information. Please follow the directions to create your username and password.     Your access code is: [email or web address]  Expires: 3/14/2018 12:02 PM     Your access code will  in 90 days. If you need help or a new code, please call your [facility] or [phone].        Care EveryWhere ID  "    This is your Care EveryWhere ID. This could be used by other organizations to access your [facility] medical records  [id number]         Blood Pressure from Last 3 Encounters:   02/17/17 131/74   01/24/17 115/73   12/06/16 116/75    Weight from Last 3 Encounters:   02/17/17 218 lb (98.9 kg)   01/24/17 217 lb (98.4 kg)   12/06/16 216 lb 3.2 oz (98.1 kg)              We Performed the Following     FLU VAC, SPLIT VIRUS IM > 3 YO (QUADRIVALENT) [24583]     Vaccine Administration, Initial [79970]        Primary Care Provider Office Phone # Fax #    Eloina Garcia [phone]       [facility] [address] [facility] [address]        Equal Access to Services     CARLOTA PEREZ : Keren jones Soharesh, waaxda luqadaha, qaybta kaalmastephani celeste, warren rodriguez. So [facility] [phone].    ATENCIÓN: Si habla español, tiene a rivera disposición servicios gratuitos de asistencia lingüística. Rashad al [phone].    We comply with applicable federal civil rights laws and [place] laws. We do not discriminate on the basis of race, color, national origin, age, disability, sex, sexual orientation, or gender identity.            Thank you!     Thank you for choosing [facility]  for your care. Our goal is always to provide you with excellent care. Hearing back from our patients is one way we can continue to improve our services. Please take a few minutes to complete the written survey that you may receive in the mail after your visit with us. Thank you!             Your Updated Medication List - Protect others around you: Learn how to safely use, store and throw away your medicines at www.disposemymeds.org.          This list is accurate as of: 12/14/17 12:02 PM.  Always use your most recent med list.                   Brand Name Dispense Instructions for use Diagnosis    aspirin 81 MG tablet     90 tablet    Take 1 tablet (81 mg) by  mouth daily    Non-ST elevation (NSTEMI) myocardial infarction (H)       atorvastatin 80 MG tablet    LIPITOR    90 tablet    Take 1 tablet (80 mg) by mouth daily    Non-ST elevation (NSTEMI) myocardial infarction (H), Hyperlipidemia with target LDL less than 100       BASAGLAR 100 UNIT/ML injection     3 mL    Inject 24 Units Subcutaneous At Bedtime    Diabetes mellitus type 2, insulin dependent (H)       blood glucose monitoring meter device kit    no brand specified    1 kit    Use to test blood sugar 2-4 times daily or as directed.    Diabetes mellitus due to underlying condition with hyperosmolarity without coma, with long-term current use of insulin (H)       * blood glucose monitoring test strip    no brand specified    1 Box    by In Vitro route daily    Type 2 diabetes mellitus with diabetic retinopathy, macular edema presence unspecified, with unspecified retinopathy severity       * blood glucose monitoring test strip    no brand specified    100 each    Use to test blood sugar 2-4 times daily or as directed.    Diabetes mellitus due to underlying condition with hyperosmolarity without coma, with long-term current use of insulin (H)       cephALEXin 500 MG capsule    KEFLEX    30 capsule    Take 1 capsule (500 mg) by mouth 3 times daily    Cutaneous abscess of other site       insulin aspart 100 UNIT/ML injection    NovoLOG FLEXPEN    15 mL    14 units before meals and 5 units after late night snack    Type 2 diabetes mellitus without complication, with long-term current use of insulin (H)       insulin pen needle 31G X 8 MM    B-D U/F    100 each    Use 3 daily or as directed.    Type 2 diabetes mellitus without complication, with long-term current use of insulin (H)       LIFESCAN FINEPOINT LANCETS Misc     100 each    Use to test blood sugars 2-4 times daily or as directed.    Diabetes mellitus due to underlying condition with hyperosmolarity without coma, with long-term current use of insulin (H)        lisinopril 5 MG tablet    PRINIVIL/ZESTRIL    90 tablet    Take 1 tablet (5 mg) by mouth daily    Non-ST elevation (NSTEMI) myocardial infarction (H), Type 2 diabetes mellitus without complication, with long-term current use of insulin (H), Essential hypertension with goal blood pressure less than 140/90       loratadine 10 MG capsule    CLARITIN    30 capsule    Take 10 mg by mouth daily    Seasonal allergic rhinitis due to pollen       metFORMIN 500 MG 24 hr tablet    GLUCOPHAGE-XR    120 tablet    Take 4 tablets (2,000 mg) by mouth daily    Type 2 diabetes mellitus without complication, with long-term current use of insulin (H)       metoprolol 50 MG 24 hr tablet    TOPROL-XL    30 tablet    Take 1 tablet (50 mg) by mouth daily    Benign essential hypertension       nicotine polacrilex 2 MG gum    [employer]S NICOTINE POLACRILEX    30 tablet    Place 1 each (2 mg) inside cheek as needed for smoking cessation    Cigarette nicotine dependence without complication       nitroGLYcerin 0.4 MG sublingual tablet    NITROSTAT    20 tablet    Place 1 tablet (0.4 mg) under the tongue every 5 minutes as needed for chest pain    Non-ST elevation (NSTEMI) myocardial infarction (H)       SM HEATING PAD Pads     1 each    1 Box nightly as needed    Hematoma       * Notice:  This list has 2 medication(s) that are the same as other medications prescribed for you. Read the directions carefully, and ask your doctor or other care provider to review them with you.

## 2017-12-15 DIAGNOSIS — I10 BENIGN ESSENTIAL HYPERTENSION: ICD-10-CM

## 2017-12-15 DIAGNOSIS — I21.4 NON-ST ELEVATION (NSTEMI) MYOCARDIAL INFARCTION (H): ICD-10-CM

## 2017-12-15 DIAGNOSIS — E78.5 HYPERLIPIDEMIA WITH TARGET LDL LESS THAN 100: ICD-10-CM

## 2017-12-15 RX ORDER — METOPROLOL SUCCINATE 50 MG/1
50 TABLET, EXTENDED RELEASE ORAL DAILY
Qty: 30 TABLET | Refills: 3 | Status: SHIPPED | OUTPATIENT
Start: 2017-12-15 | End: 2018-04-16

## 2017-12-15 RX ORDER — ATORVASTATIN CALCIUM 80 MG/1
80 TABLET, FILM COATED ORAL DAILY
Qty: 90 TABLET | Refills: 3 | Status: SHIPPED | OUTPATIENT
Start: 2017-12-15 | End: 2018-12-23

## 2018-02-13 DIAGNOSIS — E11.9 TYPE 2 DIABETES MELLITUS WITHOUT COMPLICATION, WITH LONG-TERM CURRENT USE OF INSULIN (H): ICD-10-CM

## 2018-02-13 DIAGNOSIS — Z79.4 TYPE 2 DIABETES MELLITUS WITHOUT COMPLICATION, WITH LONG-TERM CURRENT USE OF INSULIN (H): ICD-10-CM

## 2018-02-14 RX ORDER — METFORMIN HCL 500 MG
2000 TABLET, EXTENDED RELEASE 24 HR ORAL DAILY
Qty: 120 TABLET | Refills: 3 | Status: SHIPPED | OUTPATIENT
Start: 2018-02-14 | End: 2018-06-18

## 2018-03-09 NOTE — TELEPHONE ENCOUNTER
Refill Request:     Drug:Basaglar  Last Fill Date: 01/09/2017  Last Fill Quantity: 15  Last Office Visit: 02/17/17    Thank you,  Yamile Licona, PharmD  [facility]      
normal...

## 2018-03-16 DIAGNOSIS — I21.4 NON-ST ELEVATION (NSTEMI) MYOCARDIAL INFARCTION (H): ICD-10-CM

## 2018-04-16 DIAGNOSIS — I10 BENIGN ESSENTIAL HYPERTENSION: ICD-10-CM

## 2018-04-16 RX ORDER — METOPROLOL SUCCINATE 50 MG/1
50 TABLET, EXTENDED RELEASE ORAL DAILY
Qty: 30 TABLET | Refills: 3 | Status: SHIPPED | OUTPATIENT
Start: 2018-04-16 | End: 2018-08-14

## 2018-05-16 DIAGNOSIS — Z79.4 TYPE 2 DIABETES MELLITUS WITHOUT COMPLICATION, WITH LONG-TERM CURRENT USE OF INSULIN (H): ICD-10-CM

## 2018-05-16 DIAGNOSIS — E11.9 DIABETES MELLITUS TYPE 2, INSULIN DEPENDENT (H): ICD-10-CM

## 2018-05-16 DIAGNOSIS — E11.9 TYPE 2 DIABETES MELLITUS WITHOUT COMPLICATION, WITH LONG-TERM CURRENT USE OF INSULIN (H): ICD-10-CM

## 2018-05-16 DIAGNOSIS — Z79.4 DIABETES MELLITUS DUE TO UNDERLYING CONDITION WITH HYPEROSMOLARITY WITHOUT COMA, WITH LONG-TERM CURRENT USE OF INSULIN (H): ICD-10-CM

## 2018-05-16 DIAGNOSIS — Z79.4 DIABETES MELLITUS TYPE 2, INSULIN DEPENDENT (H): ICD-10-CM

## 2018-05-16 DIAGNOSIS — E08.00 DIABETES MELLITUS DUE TO UNDERLYING CONDITION WITH HYPEROSMOLARITY WITHOUT COMA, WITH LONG-TERM CURRENT USE OF INSULIN (H): ICD-10-CM

## 2018-05-17 RX ORDER — INSULIN GLARGINE 100 [IU]/ML
24 INJECTION, SOLUTION SUBCUTANEOUS AT BEDTIME
Qty: 3 ML | Refills: 3 | Status: SHIPPED | OUTPATIENT
Start: 2018-05-17 | End: 2018-11-06

## 2018-06-06 ENCOUNTER — PATIENT OUTREACH (OUTPATIENT)
Dept: FAMILY MEDICINE | Facility: CLINIC | Age: 53
End: 2018-06-06

## 2018-06-18 DIAGNOSIS — E08.00 DIABETES MELLITUS DUE TO UNDERLYING CONDITION WITH HYPEROSMOLARITY WITHOUT COMA, WITH LONG-TERM CURRENT USE OF INSULIN (H): ICD-10-CM

## 2018-06-18 DIAGNOSIS — Z79.4 DIABETES MELLITUS DUE TO UNDERLYING CONDITION WITH HYPEROSMOLARITY WITHOUT COMA, WITH LONG-TERM CURRENT USE OF INSULIN (H): ICD-10-CM

## 2018-06-18 RX ORDER — BLOOD-GLUCOSE METER
EACH MISCELLANEOUS
Qty: 100 EACH | Refills: 11 | Status: SHIPPED | OUTPATIENT
Start: 2018-06-18 | End: 2018-11-21

## 2018-08-14 DIAGNOSIS — I10 BENIGN ESSENTIAL HYPERTENSION: ICD-10-CM

## 2018-08-16 RX ORDER — METOPROLOL SUCCINATE 50 MG/1
50 TABLET, EXTENDED RELEASE ORAL DAILY
Qty: 30 TABLET | Refills: 3 | Status: SHIPPED | OUTPATIENT
Start: 2018-08-16 | End: 2018-11-21

## 2018-11-06 DIAGNOSIS — Z79.4 TYPE 2 DIABETES MELLITUS WITHOUT COMPLICATION, WITH LONG-TERM CURRENT USE OF INSULIN (H): ICD-10-CM

## 2018-11-06 DIAGNOSIS — E11.9 DIABETES MELLITUS TYPE 2, INSULIN DEPENDENT (H): ICD-10-CM

## 2018-11-06 DIAGNOSIS — Z79.4 DIABETES MELLITUS TYPE 2, INSULIN DEPENDENT (H): ICD-10-CM

## 2018-11-06 DIAGNOSIS — E11.9 TYPE 2 DIABETES MELLITUS WITHOUT COMPLICATION, WITH LONG-TERM CURRENT USE OF INSULIN (H): ICD-10-CM

## 2018-11-06 RX ORDER — INSULIN GLARGINE 100 [IU]/ML
24 INJECTION, SOLUTION SUBCUTANEOUS AT BEDTIME
Qty: 3 ML | Refills: 3 | Status: SHIPPED | OUTPATIENT
Start: 2018-11-06 | End: 2018-11-21

## 2018-11-16 DIAGNOSIS — E11.9 TYPE 2 DIABETES MELLITUS WITHOUT COMPLICATION, WITH LONG-TERM CURRENT USE OF INSULIN (H): ICD-10-CM

## 2018-11-16 DIAGNOSIS — I21.4 NON-ST ELEVATION (NSTEMI) MYOCARDIAL INFARCTION (H): ICD-10-CM

## 2018-11-16 DIAGNOSIS — Z79.4 TYPE 2 DIABETES MELLITUS WITHOUT COMPLICATION, WITH LONG-TERM CURRENT USE OF INSULIN (H): ICD-10-CM

## 2018-11-16 DIAGNOSIS — I10 ESSENTIAL HYPERTENSION WITH GOAL BLOOD PRESSURE LESS THAN 140/90: ICD-10-CM

## 2018-11-19 RX ORDER — LISINOPRIL 5 MG/1
5 TABLET ORAL DAILY
Qty: 30 TABLET | Refills: 0 | Status: SHIPPED | OUTPATIENT
Start: 2018-11-19 | End: 2018-12-20

## 2018-11-21 DIAGNOSIS — Z79.4 TYPE 2 DIABETES MELLITUS WITHOUT COMPLICATION, WITH LONG-TERM CURRENT USE OF INSULIN (H): ICD-10-CM

## 2018-11-21 DIAGNOSIS — E11.9 TYPE 2 DIABETES MELLITUS WITHOUT COMPLICATION, WITH LONG-TERM CURRENT USE OF INSULIN (H): ICD-10-CM

## 2018-11-21 DIAGNOSIS — Z79.4 DIABETES MELLITUS DUE TO UNDERLYING CONDITION WITH HYPEROSMOLARITY WITHOUT COMA, WITH LONG-TERM CURRENT USE OF INSULIN (H): ICD-10-CM

## 2018-11-21 DIAGNOSIS — E11.9 DIABETES MELLITUS TYPE 2, INSULIN DEPENDENT (H): ICD-10-CM

## 2018-11-21 DIAGNOSIS — E08.00 DIABETES MELLITUS DUE TO UNDERLYING CONDITION WITH HYPEROSMOLARITY WITHOUT COMA, WITH LONG-TERM CURRENT USE OF INSULIN (H): ICD-10-CM

## 2018-11-21 DIAGNOSIS — Z79.4 DIABETES MELLITUS TYPE 2, INSULIN DEPENDENT (H): ICD-10-CM

## 2018-11-21 DIAGNOSIS — I10 BENIGN ESSENTIAL HYPERTENSION: ICD-10-CM

## 2018-11-21 RX ORDER — METOPROLOL SUCCINATE 50 MG/1
50 TABLET, EXTENDED RELEASE ORAL DAILY
Qty: 30 TABLET | Refills: 3 | Status: SHIPPED | OUTPATIENT
Start: 2018-11-21 | End: 2019-03-20

## 2018-11-21 RX ORDER — INSULIN GLARGINE 100 [IU]/ML
24 INJECTION, SOLUTION SUBCUTANEOUS AT BEDTIME
Qty: 3 ML | Refills: 3 | Status: SHIPPED | OUTPATIENT
Start: 2018-11-21 | End: 2018-11-27

## 2018-11-26 ENCOUNTER — TELEPHONE (OUTPATIENT)
Dept: FAMILY MEDICINE | Facility: CLINIC | Age: 53
End: 2018-11-26

## 2018-11-26 DIAGNOSIS — Z79.4 DIABETES MELLITUS DUE TO UNDERLYING CONDITION WITH HYPEROSMOLARITY WITHOUT COMA, WITH LONG-TERM CURRENT USE OF INSULIN (H): ICD-10-CM

## 2018-11-26 DIAGNOSIS — E11.8 TYPE 2 DIABETES MELLITUS WITH COMPLICATION, WITH LONG-TERM CURRENT USE OF INSULIN (H): Primary | ICD-10-CM

## 2018-11-26 DIAGNOSIS — E08.00 DIABETES MELLITUS DUE TO UNDERLYING CONDITION WITH HYPEROSMOLARITY WITHOUT COMA, WITH LONG-TERM CURRENT USE OF INSULIN (H): ICD-10-CM

## 2018-11-26 DIAGNOSIS — Z79.4 TYPE 2 DIABETES MELLITUS WITH COMPLICATION, WITH LONG-TERM CURRENT USE OF INSULIN (H): Primary | ICD-10-CM

## 2018-11-26 NOTE — TELEPHONE ENCOUNTER
Christ Luis is not formulary under Mr. Niño's current insurance plan.  Without Prior Auth, his insurance prefers Lantus.  If Lantus appropriate for patient, can you please send a new prescription to the pharmacy?      Thank you,  Yamile Baer, PharmD  [facility]  [phone]

## 2018-11-27 DIAGNOSIS — Z79.4 TYPE 2 DIABETES MELLITUS WITHOUT COMPLICATION, WITH LONG-TERM CURRENT USE OF INSULIN (H): ICD-10-CM

## 2018-11-27 DIAGNOSIS — E11.9 TYPE 2 DIABETES MELLITUS WITHOUT COMPLICATION, WITH LONG-TERM CURRENT USE OF INSULIN (H): ICD-10-CM

## 2018-11-30 RX ORDER — METFORMIN HCL 500 MG
2000 TABLET, EXTENDED RELEASE 24 HR ORAL DAILY
Qty: 120 TABLET | Refills: 0 | Status: SHIPPED | OUTPATIENT
Start: 2018-11-30 | End: 2018-12-04

## 2018-12-03 ENCOUNTER — TELEPHONE (OUTPATIENT)
Dept: FAMILY MEDICINE | Facility: CLINIC | Age: 53
End: 2018-12-03

## 2018-12-03 DIAGNOSIS — Z79.4 TYPE 2 DIABETES MELLITUS WITHOUT COMPLICATION, WITH LONG-TERM CURRENT USE OF INSULIN (H): ICD-10-CM

## 2018-12-03 DIAGNOSIS — E11.9 TYPE 2 DIABETES MELLITUS WITHOUT COMPLICATION, WITH LONG-TERM CURRENT USE OF INSULIN (H): ICD-10-CM

## 2018-12-03 NOTE — TELEPHONE ENCOUNTER
Dr. Orozco,     Thank you for approving the refill request for Metformin for Mr. Niño last week.  Unfortunately the prescription was sent over without a preceptor's credentials and we cannot bill the patient's insurance under your credentials.    Can you please resend us the order, with a preceptor's authorization on the order?    Thank you,  Yamile Baer, PharmD  [facility]  [phone]

## 2018-12-04 RX ORDER — METFORMIN HCL 500 MG
2000 TABLET, EXTENDED RELEASE 24 HR ORAL DAILY
Qty: 120 TABLET | Refills: 0 | Status: SHIPPED | OUTPATIENT
Start: 2018-12-04 | End: 2019-01-18

## 2018-12-04 NOTE — TELEPHONE ENCOUNTER
Rx has been resent under attending name. Thanks    Giovanny Orozco D.O.  [place] [address]  [phone]

## 2018-12-20 DIAGNOSIS — I21.4 NON-ST ELEVATION (NSTEMI) MYOCARDIAL INFARCTION (H): ICD-10-CM

## 2018-12-20 DIAGNOSIS — I10 ESSENTIAL HYPERTENSION WITH GOAL BLOOD PRESSURE LESS THAN 140/90: ICD-10-CM

## 2018-12-20 DIAGNOSIS — E11.9 TYPE 2 DIABETES MELLITUS WITHOUT COMPLICATION, WITH LONG-TERM CURRENT USE OF INSULIN (H): ICD-10-CM

## 2018-12-20 DIAGNOSIS — Z79.4 TYPE 2 DIABETES MELLITUS WITHOUT COMPLICATION, WITH LONG-TERM CURRENT USE OF INSULIN (H): ICD-10-CM

## 2018-12-20 RX ORDER — LISINOPRIL 5 MG/1
5 TABLET ORAL DAILY
Qty: 30 TABLET | Refills: 0 | Status: SHIPPED | OUTPATIENT
Start: 2018-12-20 | End: 2019-01-18

## 2018-12-21 DIAGNOSIS — I21.4 NON-ST ELEVATION (NSTEMI) MYOCARDIAL INFARCTION (H): ICD-10-CM

## 2018-12-21 DIAGNOSIS — E78.5 HYPERLIPIDEMIA WITH TARGET LDL LESS THAN 100: ICD-10-CM

## 2018-12-23 RX ORDER — ATORVASTATIN CALCIUM 80 MG/1
80 TABLET, FILM COATED ORAL DAILY
Qty: 90 TABLET | Refills: 3 | Status: SHIPPED | OUTPATIENT
Start: 2018-12-23 | End: 2019-07-01

## 2019-01-18 DIAGNOSIS — E11.9 TYPE 2 DIABETES MELLITUS WITHOUT COMPLICATION, WITH LONG-TERM CURRENT USE OF INSULIN (H): ICD-10-CM

## 2019-01-18 DIAGNOSIS — Z79.4 TYPE 2 DIABETES MELLITUS WITHOUT COMPLICATION, WITH LONG-TERM CURRENT USE OF INSULIN (H): ICD-10-CM

## 2019-01-18 DIAGNOSIS — I21.4 NON-ST ELEVATION (NSTEMI) MYOCARDIAL INFARCTION (H): ICD-10-CM

## 2019-01-18 DIAGNOSIS — I10 ESSENTIAL HYPERTENSION WITH GOAL BLOOD PRESSURE LESS THAN 140/90: ICD-10-CM

## 2019-01-18 RX ORDER — LISINOPRIL 5 MG/1
5 TABLET ORAL DAILY
Qty: 30 TABLET | Refills: 0 | Status: SHIPPED | OUTPATIENT
Start: 2019-01-18 | End: 2019-02-13

## 2019-01-18 RX ORDER — METFORMIN HCL 500 MG
2000 TABLET, EXTENDED RELEASE 24 HR ORAL DAILY
Qty: 120 TABLET | Refills: 0 | Status: SHIPPED | OUTPATIENT
Start: 2019-01-18 | End: 2019-02-13

## 2019-01-21 DIAGNOSIS — Z79.4 DIABETES MELLITUS DUE TO UNDERLYING CONDITION WITH HYPEROSMOLARITY WITHOUT COMA, WITH LONG-TERM CURRENT USE OF INSULIN (H): ICD-10-CM

## 2019-01-21 DIAGNOSIS — E08.00 DIABETES MELLITUS DUE TO UNDERLYING CONDITION WITH HYPEROSMOLARITY WITHOUT COMA, WITH LONG-TERM CURRENT USE OF INSULIN (H): ICD-10-CM

## 2019-01-21 NOTE — TELEPHONE ENCOUNTER
Please send over new prescriptions without a specific manufacturing company for the supplies. Was sent over accu chek insurance not covering

## 2019-03-19 ENCOUNTER — MYC REFILL (OUTPATIENT)
Dept: FAMILY MEDICINE | Facility: CLINIC | Age: 54
End: 2019-03-19

## 2019-03-19 DIAGNOSIS — E11.9 TYPE 2 DIABETES MELLITUS WITHOUT COMPLICATION, WITH LONG-TERM CURRENT USE OF INSULIN (H): ICD-10-CM

## 2019-03-19 DIAGNOSIS — Z79.4 TYPE 2 DIABETES MELLITUS WITHOUT COMPLICATION, WITH LONG-TERM CURRENT USE OF INSULIN (H): ICD-10-CM

## 2019-03-20 ENCOUNTER — MYC REFILL (OUTPATIENT)
Dept: FAMILY MEDICINE | Facility: CLINIC | Age: 54
End: 2019-03-20

## 2019-03-20 ENCOUNTER — OFFICE VISIT (OUTPATIENT)
Dept: OPHTHALMOLOGY | Facility: CLINIC | Age: 54
End: 2019-03-20
Payer: COMMERCIAL

## 2019-03-20 DIAGNOSIS — I21.4 NON-ST ELEVATION (NSTEMI) MYOCARDIAL INFARCTION (H): ICD-10-CM

## 2019-03-20 DIAGNOSIS — I10 ESSENTIAL HYPERTENSION WITH GOAL BLOOD PRESSURE LESS THAN 140/90: ICD-10-CM

## 2019-03-20 DIAGNOSIS — Z79.4 TYPE 2 DIABETES MELLITUS WITHOUT COMPLICATION, WITH LONG-TERM CURRENT USE OF INSULIN (H): ICD-10-CM

## 2019-03-20 DIAGNOSIS — E11.9 TYPE 2 DIABETES MELLITUS WITHOUT COMPLICATION, WITH LONG-TERM CURRENT USE OF INSULIN (H): ICD-10-CM

## 2019-03-20 DIAGNOSIS — I10 BENIGN ESSENTIAL HYPERTENSION: ICD-10-CM

## 2019-03-20 DIAGNOSIS — H52.03 HYPERMETROPIA OF BOTH EYES: Primary | ICD-10-CM

## 2019-03-20 DIAGNOSIS — E11.9 TYPE 2 DIABETES MELLITUS WITHOUT OPHTHALMIC MANIFESTATIONS (H): ICD-10-CM

## 2019-03-20 DIAGNOSIS — H52.4 PRESBYOPIA OF BOTH EYES: ICD-10-CM

## 2019-03-20 RX ORDER — LISINOPRIL 5 MG/1
5 TABLET ORAL DAILY
Qty: 30 TABLET | Refills: 0 | Status: SHIPPED | OUTPATIENT
Start: 2019-03-20 | End: 2019-04-22

## 2019-03-20 RX ORDER — METFORMIN HCL 500 MG
2000 TABLET, EXTENDED RELEASE 24 HR ORAL DAILY
Qty: 120 TABLET | Refills: 0 | Status: SHIPPED | OUTPATIENT
Start: 2019-03-20 | End: 2019-05-22

## 2019-03-20 RX ORDER — METOPROLOL SUCCINATE 50 MG/1
50 TABLET, EXTENDED RELEASE ORAL DAILY
Qty: 30 TABLET | Refills: 3 | Status: SHIPPED | OUTPATIENT
Start: 2019-03-20 | End: 2019-07-01

## 2019-03-20 ASSESSMENT — VISUAL ACUITY
METHOD: SNELLEN - LINEAR
OD_CC: J1+
OD_CC+: +
OS_CC: J1+
OS_CC: 20/20
CORRECTION_TYPE: GLASSES
OS_CC+: +
OD_CC: 20/20

## 2019-03-20 ASSESSMENT — REFRACTION_WEARINGRX
OS_CYLINDER: SPHERE
OS_ADD: +2.00
OD_ADD: +2.00
OD_AXIS: 170
SPECS_TYPE: BIFOCAL
OD_CYLINDER: +0.75
OD_SPHERE: +1.00
OS_SPHERE: +1.00

## 2019-03-20 ASSESSMENT — TONOMETRY
OS_IOP_MMHG: 18
IOP_METHOD: TONOPEN
OD_IOP_MMHG: 15

## 2019-03-20 ASSESSMENT — REFRACTION_MANIFEST
OD_AXIS: 170
OS_ADD: +2.25
OS_SPHERE: +1.00
OD_SPHERE: +1.00
OD_ADD: +2.25
OS_CYLINDER: SPHERE
OD_CYLINDER: +0.75

## 2019-03-20 ASSESSMENT — CONF VISUAL FIELD
OS_NORMAL: 1
METHOD: COUNTING FINGERS
OD_NORMAL: 1

## 2019-03-20 ASSESSMENT — CUP TO DISC RATIO
OD_RATIO: 0.3
OS_RATIO: 0.3

## 2019-03-20 ASSESSMENT — EXTERNAL EXAM - LEFT EYE: OS_EXAM: NORMAL

## 2019-03-20 ASSESSMENT — EXTERNAL EXAM - RIGHT EYE: OD_EXAM: NORMAL

## 2019-03-20 ASSESSMENT — SLIT LAMP EXAM - LIDS
COMMENTS: NORMAL
COMMENTS: NORMAL

## 2019-03-20 NOTE — LETTER
Regarding: Don Niño    YOB: 1965    MRN: [id number]      Dear Dr. Schneider,     It was my pleasure to evaluate Don Niño on 3/20/2019 in our eye clinic. I have the last reported hemoglobin A1c as 7.2%.    Pertinent exam findings today included:  Visual Acuity (Snellen - Linear)       Right Left    Dist cc 20/20 + 20/20 +    Near cc J1+ J1+    Correction:  Glasses         Tonometry (Tonopen, 1:35 PM)       Right Left    Pressure 15 18         Main Ophthalmology Exam     External Exam       Right Left    External Normal Normal          Slit Lamp Exam       Right Left    Lids/Lashes Normal Normal    Conjunctiva/Sclera White and quiet White and quiet    Cornea Clear Clear    Anterior Chamber Deep and quiet Deep and quiet    Iris Dilated Dilated    Lens 1+ Nuclear sclerosis cataract Clear    Vitreous Normal Normal          Fundus Exam       Right Left    Disc SN slightly elevated w cr pigment change Normal    C/D Ratio 0.3 0.3    Macula Normal Normal    Vessels Normal Normal    Periphery Normal no DR Normal no DR                There was no diabetic retinopathy seen on dilated exam today.      I have encouraged Don to continue working with you to maintain tight control of blood glucose and blood pressure.  Thank you for the opportunity to care for Don, and if you would like to discuss anything further, please do not hesitate to contact me.  I have asked him to return to clinic in a year.           Best regards,          Halina Sharma MD        Comprehensive Ophthalmology          [facility]
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
## 2019-05-22 DIAGNOSIS — I21.4 NON-ST ELEVATION (NSTEMI) MYOCARDIAL INFARCTION (H): ICD-10-CM

## 2019-05-22 DIAGNOSIS — Z79.4 TYPE 2 DIABETES MELLITUS WITHOUT COMPLICATION, WITH LONG-TERM CURRENT USE OF INSULIN (H): ICD-10-CM

## 2019-05-22 DIAGNOSIS — E11.9 TYPE 2 DIABETES MELLITUS WITHOUT COMPLICATION, WITH LONG-TERM CURRENT USE OF INSULIN (H): ICD-10-CM

## 2019-05-22 DIAGNOSIS — I10 ESSENTIAL HYPERTENSION WITH GOAL BLOOD PRESSURE LESS THAN 140/90: ICD-10-CM

## 2019-05-22 RX ORDER — METFORMIN HCL 500 MG
2000 TABLET, EXTENDED RELEASE 24 HR ORAL DAILY
Qty: 120 TABLET | Refills: 0 | Status: SHIPPED | OUTPATIENT
Start: 2019-05-22 | End: 2019-06-18

## 2019-05-22 RX ORDER — LISINOPRIL 5 MG/1
5 TABLET ORAL DAILY
Qty: 30 TABLET | Refills: 0 | Status: SHIPPED | OUTPATIENT
Start: 2019-05-22 | End: 2019-06-18

## 2019-06-18 DIAGNOSIS — E11.9 DIABETES MELLITUS TYPE 2, INSULIN DEPENDENT (H): ICD-10-CM

## 2019-06-18 DIAGNOSIS — I10 ESSENTIAL HYPERTENSION WITH GOAL BLOOD PRESSURE LESS THAN 140/90: ICD-10-CM

## 2019-06-18 DIAGNOSIS — Z79.4 TYPE 2 DIABETES MELLITUS WITHOUT COMPLICATION, WITH LONG-TERM CURRENT USE OF INSULIN (H): ICD-10-CM

## 2019-06-18 DIAGNOSIS — E11.9 TYPE 2 DIABETES MELLITUS WITHOUT COMPLICATION, WITH LONG-TERM CURRENT USE OF INSULIN (H): ICD-10-CM

## 2019-06-18 DIAGNOSIS — I21.4 NON-ST ELEVATION (NSTEMI) MYOCARDIAL INFARCTION (H): ICD-10-CM

## 2019-06-18 DIAGNOSIS — Z79.4 DIABETES MELLITUS TYPE 2, INSULIN DEPENDENT (H): ICD-10-CM

## 2019-06-18 RX ORDER — INSULIN GLARGINE 100 [IU]/ML
24 INJECTION, SOLUTION SUBCUTANEOUS AT BEDTIME
Qty: 9 ML | Refills: 3 | Status: SHIPPED | OUTPATIENT
Start: 2019-06-18 | End: 2019-07-05

## 2019-06-18 RX ORDER — LISINOPRIL 5 MG/1
5 TABLET ORAL DAILY
Qty: 30 TABLET | Refills: 0 | Status: SHIPPED | OUTPATIENT
Start: 2019-06-18 | End: 2019-07-01

## 2019-06-18 RX ORDER — METFORMIN HCL 500 MG
2000 TABLET, EXTENDED RELEASE 24 HR ORAL DAILY
Qty: 120 TABLET | Refills: 0 | Status: SHIPPED | OUTPATIENT
Start: 2019-06-18 | End: 2019-07-31

## 2019-07-01 ENCOUNTER — HOSPITAL ENCOUNTER (EMERGENCY)
Facility: CLINIC | Age: 54
Discharge: LEFT BEFORE DISCHARGE | End: 2019-07-01
Admitting: EMERGENCY MEDICINE
Payer: COMMERCIAL

## 2019-07-01 ENCOUNTER — OFFICE VISIT (OUTPATIENT)
Dept: FAMILY MEDICINE | Facility: CLINIC | Age: 54
End: 2019-07-01
Payer: COMMERCIAL

## 2019-07-01 VITALS
BODY MASS INDEX: 29.45 KG/M2 | OXYGEN SATURATION: 97 % | TEMPERATURE: 96.5 F | RESPIRATION RATE: 16 BRPM | DIASTOLIC BLOOD PRESSURE: 76 MMHG | SYSTOLIC BLOOD PRESSURE: 126 MMHG | WEIGHT: 208 LBS

## 2019-07-01 VITALS
HEART RATE: 99 BPM | DIASTOLIC BLOOD PRESSURE: 74 MMHG | BODY MASS INDEX: 29.84 KG/M2 | TEMPERATURE: 98 F | OXYGEN SATURATION: 96 % | SYSTOLIC BLOOD PRESSURE: 114 MMHG | WEIGHT: 208.4 LBS | HEIGHT: 70 IN

## 2019-07-01 DIAGNOSIS — E11.9 DIABETES MELLITUS TYPE 2, INSULIN DEPENDENT (H): Primary | ICD-10-CM

## 2019-07-01 DIAGNOSIS — E11.9 TYPE 2 DIABETES MELLITUS WITHOUT COMPLICATION, WITH LONG-TERM CURRENT USE OF INSULIN (H): ICD-10-CM

## 2019-07-01 DIAGNOSIS — F17.210 CIGARETTE NICOTINE DEPENDENCE WITHOUT COMPLICATION: ICD-10-CM

## 2019-07-01 DIAGNOSIS — I21.4 NON-ST ELEVATION (NSTEMI) MYOCARDIAL INFARCTION (H): ICD-10-CM

## 2019-07-01 DIAGNOSIS — Z79.4 TYPE 2 DIABETES MELLITUS WITHOUT COMPLICATION, WITH LONG-TERM CURRENT USE OF INSULIN (H): ICD-10-CM

## 2019-07-01 DIAGNOSIS — L85.3 DRY SKIN: ICD-10-CM

## 2019-07-01 DIAGNOSIS — I10 ESSENTIAL HYPERTENSION WITH GOAL BLOOD PRESSURE LESS THAN 140/90: ICD-10-CM

## 2019-07-01 DIAGNOSIS — M25.511 ACUTE PAIN OF RIGHT SHOULDER: ICD-10-CM

## 2019-07-01 DIAGNOSIS — T14.8XXA HEMATOMA: ICD-10-CM

## 2019-07-01 DIAGNOSIS — I10 BENIGN ESSENTIAL HYPERTENSION: ICD-10-CM

## 2019-07-01 DIAGNOSIS — J30.1 SEASONAL ALLERGIC RHINITIS DUE TO POLLEN: ICD-10-CM

## 2019-07-01 DIAGNOSIS — Z79.4 DIABETES MELLITUS TYPE 2, INSULIN DEPENDENT (H): Primary | ICD-10-CM

## 2019-07-01 DIAGNOSIS — E78.5 HYPERLIPIDEMIA WITH TARGET LDL LESS THAN 100: ICD-10-CM

## 2019-07-01 LAB
BUN SERPL-MCNC: 10.7 MG/DL (ref 7–21)
CALCIUM SERPL-MCNC: 8.9 MG/DL (ref 8.5–10.1)
CHLORIDE SERPLBLD-SCNC: 100.6 MMOL/L (ref 98–110)
CHOLEST SERPL-MCNC: 122.2 MG/DL (ref 0–200)
CHOLEST/HDLC SERPL: 3.6 {RATIO} (ref 0–5)
CO2 SERPL-SCNC: 22.4 MMOL/L (ref 20–32)
CREAT SERPL-MCNC: 0.7 MG/DL (ref 0.7–1.3)
CREAT UR-MCNC: 76 MG/DL
GFR SERPL CREATININE-BSD FRML MDRD: >90 ML/MIN/1.7 M2
GLUCOSE BLDC GLUCOMTR-MCNC: 279 MG/DL (ref 70–99)
GLUCOSE SERPL-MCNC: 530.2 MG'DL (ref 70–99)
HBA1C MFR BLD: 10.3 % (ref 4.1–5.7)
HDLC SERPL-MCNC: 34.1 MG/DL
LDLC SERPL CALC-MCNC: 62 MG/DL (ref 0–129)
MICROALBUMIN UR-MCNC: 130 MG/L
MICROALBUMIN/CREAT UR: 170.6 MG/G CR (ref 0–17)
POTASSIUM SERPL-SCNC: 4.3 MMOL/DL (ref 3.3–4.5)
SODIUM SERPL-SCNC: 133.6 MMOL/L (ref 132.6–141.4)
TRIGL SERPL-MCNC: 132.9 MG/DL (ref 0–150)
VLDL CHOLESTEROL: 26.6 MG/DL (ref 7–32)

## 2019-07-01 PROCEDURE — 00000146 ZZHCL STATISTIC GLUCOSE BY METER IP

## 2019-07-01 PROCEDURE — 40000268 ZZH STATISTIC NO CHARGES

## 2019-07-01 RX ORDER — LISINOPRIL 5 MG/1
5 TABLET ORAL DAILY
Qty: 30 TABLET | Refills: 0 | Status: SHIPPED | OUTPATIENT
Start: 2019-07-01 | End: 2019-07-31

## 2019-07-01 RX ORDER — LORATADINE 10 MG/1
10 CAPSULE, LIQUID FILLED ORAL DAILY
Qty: 30 CAPSULE | Refills: 0 | Status: SHIPPED | OUTPATIENT
Start: 2019-07-01 | End: 2019-07-31

## 2019-07-01 RX ORDER — METOPROLOL SUCCINATE 50 MG/1
50 TABLET, EXTENDED RELEASE ORAL DAILY
Qty: 30 TABLET | Refills: 3 | Status: SHIPPED | OUTPATIENT
Start: 2019-07-01 | End: 2019-11-06

## 2019-07-01 RX ORDER — ATORVASTATIN CALCIUM 80 MG/1
80 TABLET, FILM COATED ORAL DAILY
Qty: 90 TABLET | Refills: 3 | Status: SHIPPED | OUTPATIENT
Start: 2019-07-01 | End: 2020-07-09

## 2019-07-01 RX ORDER — ACETAMINOPHEN 500 MG
1000 TABLET ORAL EVERY 8 HOURS PRN
Qty: 180 TABLET | Refills: 1 | Status: SHIPPED | OUTPATIENT
Start: 2019-07-01 | End: 2019-07-05

## 2019-07-01 RX ORDER — VIT E ACET/GLY/DIMETH/WATER
LOTION (ML) TOPICAL PRN
Qty: 250 ML | Refills: 11 | Status: SHIPPED | OUTPATIENT
Start: 2019-07-01 | End: 2020-03-27

## 2019-07-01 RX ORDER — NITROGLYCERIN 0.4 MG/1
0.4 TABLET SUBLINGUAL EVERY 5 MIN PRN
Qty: 20 TABLET | Refills: 0 | Status: SHIPPED | OUTPATIENT
Start: 2019-07-01 | End: 2021-02-25

## 2019-07-01 ASSESSMENT — ENCOUNTER SYMPTOMS
DYSURIA: 0
JOINT SWELLING: 0
SHORTNESS OF BREATH: 0
DIARRHEA: 0
COUGH: 0
NUMBNESS: 0
PALPITATIONS: 0
CHILLS: 0
RHINORRHEA: 0
HEADACHES: 0
DIFFICULTY URINATING: 0
FEVER: 0
MYALGIAS: 0
WHEEZING: 0
ABDOMINAL PAIN: 0
CONSTIPATION: 0
WEAKNESS: 0
DIZZINESS: 0

## 2019-07-01 ASSESSMENT — PAIN SCALES - GENERAL: PAINLEVEL: EXTREME PAIN (8)

## 2019-07-01 ASSESSMENT — MIFFLIN-ST. JEOR: SCORE: 1804.04

## 2019-07-01 NOTE — Clinical Note
Keith, I changed your Level of Service. Given you addressed more than 1 problem and prescribed meds or ordered tests, it qualifies as a level 4 (24440). To meet criteria for level 5 medical complexity you need to address multiple acute and chronic problems, at least one of which is decompensated so bad they may need hospitalization - this actually applies to our cassie. So I changed the billing to level 5 (98349). Double check that your documentation supports this level of billing, if you edit anything send the note back to me. -Daily Daniel MD

## 2019-07-01 NOTE — PROGRESS NOTES
HPI       Don Niño is a 54 year old  who presents for diabetes follow up and R shoulder pain.    R arm pain started 2 days ago when he fell off a chair. Pain 8/10. Has tried tylenol 320mg-no relief. Diffuse pain over R shoulder-travels down to elbow. Denies numbness/tingling, swelling, erythema, weakness, and prior arm injuries. Unable to raise R arm above shoulder level.      Chief Complaint   Patient presents with     Pain     fell on right arm/shoulder 2 days ago, can't lift arm high, pain level 8-9     RECHECK     DM     errectile dysfunction       Diabetes Follow-up    Patient is checking blood sugars: twice daily.     AM sugars: 150-160; 2hrs after meals: 280  Blood sugar testing frequency justification: On insulin, frequency appropriate  and Uncontrolled diabetes   Took insulin during Ramadan only at night  Occasionally only takes 1000mg metformin a day  Unclear if patient is compliant with all medications    Last A1C was 8.0 in 2016 and 10.3 today.    Lab Results   Component Value Date    A1C 10.3 07/01/2019    A1C 8.0 12/06/2016    A1C 8.8 08/17/2016    A1C 8.8 05/11/2016    A1C 8.2 10/16/2015        Diabetic concerns: None    Chest Pain or exercise related calf pain (claudication):no     Symptoms of hypoglycemia (low blood sugar): none     Paresthesias (numbness or burning in feet) or sores: No     Diabetic eye exam within the last year?: Yes      Adherence and Exercise  Medication side effects: no  How often is a medication missed? About 1-3 times per week  Exercise: walking 45mn 2 days/week       Problem, Medication and Allergy Lists were reviewed and updated if needed..    Patient is an established patient of this clinic.    Past Medical History:   Diagnosis Date     CAD (coronary artery disease)     MI     Diabetes mellitus (H)      Hyperlipidaemia      Hypertension    .         Review of Systems:   Review of Systems   Constitutional: Negative for chills and fever.   HENT: Negative for  "congestion and rhinorrhea.    Eyes: Negative for visual disturbance.   Respiratory: Negative for cough, shortness of breath and wheezing.    Cardiovascular: Negative for chest pain and palpitations.   Gastrointestinal: Negative for abdominal pain, constipation and diarrhea.   Endocrine: Negative for polyuria.   Genitourinary: Negative for difficulty urinating and dysuria.   Musculoskeletal: Negative for joint swelling and myalgias. Arthralgias: R arm pain due to fall.   Skin: Negative for rash.   Neurological: Negative for dizziness, weakness, numbness and headaches.            Physical Exam:     Vitals:    07/01/19 1505   BP: 114/74   Pulse: 99   Temp: 98  F (36.7  C)   TempSrc: Oral   SpO2: 96%   Weight: 94.5 kg (208 lb 6.4 oz)   Height: 1.79 m (5' 10.47\")     Body mass index is 29.5 kg/m .  Vitals were reviewed and were normal     Physical Exam   Constitutional: He appears well-developed and well-nourished.   HENT:   Head: Normocephalic and atraumatic.   Right Ear: External ear normal.   Left Ear: External ear normal.   Eyes: Conjunctivae are normal.   Cardiovascular: Normal rate, regular rhythm, normal heart sounds and intact distal pulses.   Pulmonary/Chest: Effort normal and breath sounds normal. He has no wheezes. He has no rales.   Abdominal: Soft. Bowel sounds are normal. He exhibits no distension. There is no tenderness. There is no guarding.   Musculoskeletal:        Right shoulder: He exhibits decreased range of motion and tenderness. He exhibits no bony tenderness, no swelling, no effusion, no crepitus, normal pulse and normal strength.        Arms:  Unable to abduct right shoulder beyond 90 degrees         Results:      Results from this visit  Results for orders placed or performed in visit on 07/01/19   Hemoglobin A1c ([facility])   Result Value Ref Range    Hemoglobin A1C 10.3 (H) 4.1 - 5.7 %   Lipid Cascade ([facility])   Result Value Ref Range    Cholesterol 122.2 0.0 - 200.0 mg/dL    Cholesterol/HDL " Ratio 3.6 0.0 - 5.0    HDL Cholesterol 34.1 (L) >40.0 mg/dL    LDL Cholesterol Calculated 62 0 - 129 mg/dL    Triglycerides 132.9 0.0 - 150.0 mg/dL    VLDL Cholesterol 26.6 7.0 - 32.0 mg/dL   Basic Metabolic Panel ([facility])   Result Value Ref Range    Urea Nitrogen 10.7 7.0 - 21.0 mg/dL    Calcium 8.9 8.5 - 10.1 mg/dL    Chloride 100.6 98.0 - 110.0 mmol/L    Carbon Dioxide 22.4 20.0 - 32.0 mmol/L    Creatinine 0.7 0.7 - 1.3 mg/dL    Glucose 530.2 (HH) 70.0 - 99.0 mg'dL    Potassium 4.3 3.3 - 4.5 mmol/dL    Sodium 133.6 132.6 - 141.4 mmol/L    GFR Estimate >90 >60.0 mL/min/1.7 m2    GFR Estimate If Black >90 >60.0 mL/min/1.7 m2    Narrative    Critical Value was reported to and read back by Dr. Santizo at 7/1/2019 3:28   PM.MR       Assessment and Plan        1. Diabetes mellitus type 2, insulin dependent (H)  -A1c 10.3 today; elevated glucose 530.2  -patient was advised and counseled to go to the ED to safely lower glucose level   -make follow up appt in 1-2 days to reassess insulin dosing & get foot exam at this time    - Hemoglobin A1c ([facility])  - Lipid Cascade ([facility])  - Basic Metabolic Panel ([facility])  - Albumin Random Urine Quantitative with Creat Ratio    2. Acute pain of right shoulder  -no concern for fracture due to absence of edema and preserved muscle strength  -tylenol 1000mg TID as needed for pain; heat/ice as needed  -return in 1wk if pain does not improve-will consider xray then    - acetaminophen (TYLENOL) 500 MG tablet; Take 2 tablets (1,000 mg) by mouth every 8 hours as needed for mild pain  Dispense: 180 tablet; Refill: 1  - Heating Pads (SM HEATING PAD) PADS; 1 Box nightly as needed  Dispense: 1 each; Refill: 1    3. Benign essential hypertension  -stable; continue current medications  - metoprolol succinate ER (TOPROL-XL) 50 MG 24 hr tablet; Take 1 tablet (50 mg) by mouth daily  Dispense: 30 tablet; Refill: 3  - lisinopril (PRINIVIL/ZESTRIL) 5 MG tablet; Take 1 tablet (5 mg) by mouth daily   Dispense: 30 tablet; Refill: 0    4. Cigarette nicotine dependence without complication  -continue nicotine gum; will address smoking cessation plan at next appt    - nicotine ([employer] NICOTINE POLACRILEX) 2 MG gum; Place 1 each (2 mg) inside cheek as needed for smoking cessation  Dispense: 30 tablet; Refill: 3    5. Hyperlipidemia with target LDL less than 100  - atorvastatin (LIPITOR) 80 MG tablet; Take 1 tablet (80 mg) by mouth daily  Dispense: 90 tablet; Refill: 3    6. Seasonal allergic rhinitis due to pollen  - loratadine (CLARITIN) 10 MG capsule; Take 10 mg by mouth daily  Dispense: 30 capsule; Refill: 0    7. Dry skin  - CETAPHIL MOISTURIZING (CETAPHIL) external lotion; Apply topically as needed for dry skin  Dispense: 250 mL; Refill: 11       Medications Discontinued During This Encounter   Medication Reason     insulin glargine (LANTUS SOLOSTAR PEN) 100 UNIT/ML pen Unavailable     cephALEXin (KEFLEX) 500 MG capsule Stopped by Patient     metoprolol succinate ER (TOPROL-XL) 50 MG 24 hr tablet Reorder     nicotine polacrilex ([employer]S NICOTINE POLACRILEX) 2 MG gum Reorder     lisinopril (PRINIVIL/ZESTRIL) 5 MG tablet Reorder     Heating Pads (SM HEATING PAD) PADS Reorder     atorvastatin (LIPITOR) 80 MG tablet Reorder     aspirin (ASA) 81 MG tablet Reorder     nitroglycerin (NITROSTAT) 0.4 MG SL tablet Reorder     loratadine (CLARITIN) 10 MG capsule Reorder       Options for treatment and follow-up care were reviewed with the patient. Don Niño  engaged in the decision making process and verbalized understanding of the options discussed and agreed with the final plan.    Keith Santizo DO    ADDENDUM  After clinic visit, patient presented to ED triage but left without being seen. Will ask clinic RN Care Coordinator to reach out to patient. If BG still >400 would recommend returning to ED, if <400 needs clinic visit ASAP for diabetes medication adjustment with PharmD or provider. -Daily Daniel MD  7/2/2019 1:59 PM

## 2019-07-01 NOTE — PROGRESS NOTES
Preceptor Attestation:   Patient seen, evaluated and discussed with the resident. I have verified the content of the note, which accurately reflects my assessment of the patient and the plan of care.   Supervising Physician:  Daily Daniel MD

## 2019-07-01 NOTE — PATIENT INSTRUCTIONS
Here is the plan from today's visit    1. Diabetes mellitus type 2, insulin dependent (H)  Glucose elevated over 500 today-recommend going to ER after clinic visit to lower glucose level safely. Please make appt at clinic in the next 1-3 days to go over diabetes medications.    - Hemoglobin A1c ([facility])  - Lipid Cascade ([facility])  - Basic Metabolic Panel ([facility])  - Albumin Random Urine Quantitative with Creat Ratio    2. Acute pain of right shoulder  Take tylenol 1000mg, three times a day as needed.  Try heat or ice pack.  Return in 1 week if shoulder pain does not improve.      Please call or return to clinic if your symptoms don't go away.    Follow up plan  Please make a clinic appointment for follow up with physician at [facility] in the next 1-3 days to recheck/refill diabetes medications. Return in 1 week if shoulder pain does not improve.    Thank you for coming to [facility] today.  Lab Testing:  **If you had lab testing today and your results are reassuring or normal they will be mailed to you or sent through [employer] within 7 days.   **If the lab tests need quick action we will call you with the results.  The phone number we will call with results is # [phone] (home) [phone] (work). If this is not the best number please call our clinic and change the number.  Medication Refills:  If you need any refills please call your pharmacy and they will contact us.   If you need to  your refill at a new pharmacy, please contact the new pharmacy directly. The new pharmacy will help you get your medications transferred faster.   Scheduling:  If you have any concerns about today's visit or wish to schedule another appointment please call our office during normal business hours [phone] (8-5:00 M-F)  If a referral was made to a [facility] and you don't get a call from central scheduling please call [phone].  If a Mammogram was ordered for you at [facility]  call [phone] to schedule or change your appointment.  If you had an XRay/CT/Ultrasound/MRI ordered the number is [phone] to schedule or change your radiology appointment.   Medical Concerns:  If you have urgent medical concerns please call [phone] at any time of the day.    Keith Santizo MD

## 2019-07-02 ENCOUNTER — TELEPHONE (OUTPATIENT)
Dept: FAMILY MEDICINE | Facility: CLINIC | Age: 54
End: 2019-07-02

## 2019-07-02 NOTE — TELEPHONE ENCOUNTER
RN called pt who states he went to ED yesterday and they checked his BS, which was 279, so they sent him home. Pt reports today blood sugar was 159. Follow up scheduled with Dr. Santizo on 7/5    Fabi Dodd RN

## 2019-07-02 NOTE — TELEPHONE ENCOUNTER
Clinic RN,   Please follow up with patient that Dr. Santizo saw yesterday and sent to the ED for hyperglycemia.  After clinic visit, patient presented to ED triage but left without being seen. If BG still >400 would recommend returning to ED, if <400 needs clinic visit ASAP for diabetes medication adjustment with PharmD (ideally) or provider.   -Daily Daniel MD

## 2019-07-05 ENCOUNTER — OFFICE VISIT (OUTPATIENT)
Dept: PHARMACY | Facility: CLINIC | Age: 54
End: 2019-07-05
Payer: COMMERCIAL

## 2019-07-05 ENCOUNTER — OFFICE VISIT (OUTPATIENT)
Dept: FAMILY MEDICINE | Facility: CLINIC | Age: 54
End: 2019-07-05
Payer: COMMERCIAL

## 2019-07-05 VITALS
WEIGHT: 209.2 LBS | DIASTOLIC BLOOD PRESSURE: 80 MMHG | HEIGHT: 70 IN | HEART RATE: 101 BPM | TEMPERATURE: 98.9 F | SYSTOLIC BLOOD PRESSURE: 117 MMHG | BODY MASS INDEX: 29.95 KG/M2

## 2019-07-05 DIAGNOSIS — E11.9 TYPE 2 DIABETES MELLITUS WITHOUT COMPLICATION, WITH LONG-TERM CURRENT USE OF INSULIN (H): Primary | ICD-10-CM

## 2019-07-05 DIAGNOSIS — G89.11 ACUTE SHOULDER PAIN DUE TO TRAUMA, RIGHT: ICD-10-CM

## 2019-07-05 DIAGNOSIS — E11.65 UNCONTROLLED TYPE 2 DIABETES MELLITUS WITH HYPERGLYCEMIA (H): Primary | ICD-10-CM

## 2019-07-05 DIAGNOSIS — Z79.4 TYPE 2 DIABETES MELLITUS WITHOUT COMPLICATION, WITH LONG-TERM CURRENT USE OF INSULIN (H): Primary | ICD-10-CM

## 2019-07-05 DIAGNOSIS — E11.9 TYPE 2 DIABETES MELLITUS WITHOUT COMPLICATION (H): Primary | ICD-10-CM

## 2019-07-05 DIAGNOSIS — M25.511 ACUTE SHOULDER PAIN DUE TO TRAUMA, RIGHT: ICD-10-CM

## 2019-07-05 RX ORDER — ACETAMINOPHEN 500 MG
1000 TABLET ORAL EVERY 8 HOURS PRN
Qty: 180 TABLET | Refills: 1 | Status: SHIPPED | OUTPATIENT
Start: 2019-07-05 | End: 2020-01-08

## 2019-07-05 ASSESSMENT — ENCOUNTER SYMPTOMS
COUGH: 0
CONSTIPATION: 0
FATIGUE: 0
WHEEZING: 0
SHORTNESS OF BREATH: 0
PALPITATIONS: 0
HEADACHES: 0
NAUSEA: 0
DIZZINESS: 0
SORE THROAT: 0
EYE PAIN: 0
CHILLS: 0
DYSURIA: 0
JOINT SWELLING: 0
LIGHT-HEADEDNESS: 0
DIARRHEA: 0
NUMBNESS: 0
VOMITING: 0
ABDOMINAL PAIN: 0
RHINORRHEA: 0

## 2019-07-05 ASSESSMENT — MIFFLIN-ST. JEOR: SCORE: 1807.66

## 2019-07-05 NOTE — PATIENT INSTRUCTIONS
Here is the plan from today's visit    1. Type 2 diabetes mellitus without complication, with long-term current use of insulin (H)  -continue metformin 2000mg daily  -start taking lantus 26 units daily  -start taking 10 units novalog before big meals, 5 before small meals  -return on Monday for visit with pharmacy-please bring your glucometer/glucose readings    2. Acute shoulder pain due to trauma, right  -start taking tylenol 1000mg three times a day  -use heat/ice    Please call or return to clinic if your symptoms don't go away.      Thank you for coming to [facility] today.  Lab Testing:  **If you had lab testing today and your results are reassuring or normal they will be mailed to you or sent through [employer] within 7 days.   **If the lab tests need quick action we will call you with the results.  The phone number we will call with results is # [phone] (home) [phone] (work). If this is not the best number please call our clinic and change the number.  Medication Refills:  If you need any refills please call your pharmacy and they will contact us.   If you need to  your refill at a new pharmacy, please contact the new pharmacy directly. The new pharmacy will help you get your medications transferred faster.   Scheduling:  If you have any concerns about today's visit or wish to schedule another appointment please call our office during normal business hours [phone] (8-5:00 M-F)  If a referral was made to a [facility] and you don't get a call from central scheduling please call [phone].  If a Mammogram was ordered for you at [facility] call [phone] to schedule or change your appointment.  If you had an XRay/CT/Ultrasound/MRI ordered the number is [phone] to schedule or change your radiology appointment.   Medical Concerns:  If you have urgent medical concerns please call [phone] at any time of the day.    Keith Santizo, DO

## 2019-07-05 NOTE — NURSING NOTE
Diabetic Foot Screen:  Any complaints of increased pain or numbness ? No  Is there a foot ulcer now or a history of foot ulcer? No  Does the foot have an abnormal shape? No  Are the nails thick, too long or ingrown? No  Are there any redness or open areas? No         Sensation Testing done at all points on the diagram with monofilament     Right Foot: Sensation Normal at all points  Left Foot: Sensation Normal at all points     Risk Category: 0- No loss of protective sensation  Performed by CARLOS MCDONALD MA

## 2019-07-05 NOTE — PROGRESS NOTES
HPI       Chief Complaint   Patient presents with     RECHECK     medication adjustment so it lasts,     Pain     right arm, shoulder to hand,      Don Niño is a 54 year old  who presents for DM Type 2 follow up. Was seen in clinic on 7/1/19 by myself- glucose in clinic was 530. Patient was sent to the ED to safely lower his glucose level. ED glucose was 279 and was sent home.   He is here to readjust and refill his DM meds. A1C on 7/1/19 was 10.3. States he is compliant with his medications.    1) Diabetes Follow-up   Patient is checking blood sugars: twice daily.    Blood sugar testing frequency justification: On insulin, frequency appropriate   Results are as follows:     am - 160s     postprandial after supper- 200s    Lab Results   Component Value Date    A1C 10.3 07/01/2019    A1C 8.0 12/06/2016    A1C 8.8 08/17/2016    A1C 8.8 05/11/2016    A1C 8.2 10/16/2015        Diabetic concerns: None    Chest Pain or exercise related calf pain (claudication):no     Symptoms of hypoglycemia (low blood sugar): none     Paresthesias (numbness or burning in feet) or sores: No     Diabetic eye exam within the last year?: Yes      Adherence and Exercise  Medication side effects: no  Exercise: walks 45mn 2x/wk    2) Patient also concerned about R shoulder & elbow pain  -started 6/29-fell off chair; getting worse  -no numbness/tingling, swelling, erythema, weakness  -worse with movement  -tried 325mg tylenol 2x/day; mild relief      Problem, Medication and Allergy Lists     Patient Active Problem List    Diagnosis Date Noted     Acute shoulder pain due to trauma, right 07/05/2019     Priority: Medium     Type 2 diabetes mellitus without complication (H) 11/02/2015     Priority: Medium     Health Care Home 10/19/2012     Priority: Medium     Tier 1    DX V65.8 REPLACED WITH 03838 HEALTH CARE HOME (04/08/2013)       Esophageal reflux 10/19/2012     Priority: Medium     Hypermetropia 10/19/2012     Priority: Medium      Essential hypertension 10/19/2012     Priority: Medium     Problem list name updated by automated process. Provider to review       Presbyopia 10/19/2012     Priority: Medium     Stented coronary artery 05/22/2012     Priority: Medium     Hyperlipidemia LDL goal <70 05/17/2012     Priority: Medium     Current tobacco use 04/17/2012     Priority: Medium   ,     Current Outpatient Medications   Medication Sig Dispense Refill     acetaminophen (TYLENOL) 500 MG tablet Take 2 tablets (1,000 mg) by mouth every 8 hours as needed for mild pain 180 tablet 1     aspirin (ASA) 81 MG tablet Take 1 tablet (81 mg) by mouth daily 90 tablet 3     atorvastatin (LIPITOR) 80 MG tablet Take 1 tablet (80 mg) by mouth daily 90 tablet 3     blood glucose (NO BRAND SPECIFIED) lancets standard Use to test blood sugar 3 times daily or as directed. 100 each 11     blood glucose (NO BRAND SPECIFIED) test strip 1 strip by In Vitro route daily 1 Box 2     blood glucose (NO BRAND SPECIFIED) test strip Use to test blood sugar 2-4 times daily or as directed. 100 each 11     blood glucose monitoring (NO BRAND SPECIFIED) meter device kit Use to test blood sugar 2-4 times daily or as directed. 1 kit 11     CETAPHIL MOISTURIZING (CETAPHIL) external lotion Apply topically as needed for dry skin 250 mL 11     insulin aspart (NOVOLOG FLEXPEN) 100 UNIT/ML pen 10 units before big meals and 5 units after small meals 15 mL 3     insulin glargine (LANTUS SOLOSTAR PEN) 100 UNIT/ML pen Inject 26 Units Subcutaneous At Bedtime 3 mL 3     insulin pen needle (B-D U/F) 31G X 8 MM miscellaneous Use 3 daily or as directed. 100 each prn     lisinopril (PRINIVIL/ZESTRIL) 5 MG tablet Take 1 tablet (5 mg) by mouth daily 30 tablet 0     loratadine (CLARITIN) 10 MG capsule Take 10 mg by mouth daily 30 capsule 0     metFORMIN (GLUCOPHAGE-XR) 500 MG 24 hr tablet Take 4 tablets (2,000 mg) by mouth daily 120 tablet 0     metoprolol succinate ER (TOPROL-XL) 50 MG 24 hr tablet  "Take 1 tablet (50 mg) by mouth daily 30 tablet 3     nitroGLYcerin (NITROSTAT) 0.4 MG sublingual tablet Place 1 tablet (0.4 mg) under the tongue every 5 minutes as needed for chest pain 20 tablet 0     Heating Pads ( HEATING PAD) PADS 1 Box nightly as needed (Patient not taking: Reported on 7/5/2019) 1 each 1     nicotine ([employer] NICOTINE POLACRILEX) 2 MG gum Place 1 each (2 mg) inside cheek as needed for smoking cessation (Patient not taking: Reported on 7/5/2019) 30 tablet 3   ,   No Known Allergies.    Patient is an established patient of this clinic..         Review of Systems:   Review of Systems   Constitutional: Negative for chills and fatigue.   HENT: Negative for congestion, ear pain, rhinorrhea and sore throat.    Eyes: Negative for pain and visual disturbance.   Respiratory: Negative for cough, shortness of breath and wheezing.    Cardiovascular: Negative for chest pain, palpitations and leg swelling.   Gastrointestinal: Negative for abdominal pain, constipation, diarrhea, nausea and vomiting.   Endocrine: Negative for polyuria.   Genitourinary: Negative for dysuria.   Musculoskeletal: Negative for joint swelling.        (+) R shoulder, R elbow pain   Skin: Negative for rash.   Neurological: Negative for dizziness, light-headedness, numbness and headaches.            Physical Exam:     Vitals:    07/05/19 1313   BP: 117/80   Pulse: 101   Temp: 98.9  F (37.2  C)   TempSrc: Oral   Weight: 94.9 kg (209 lb 3.2 oz)   Height: 1.79 m (5' 10.47\")     Body mass index is 29.62 kg/m .   Pulse rechecked-85   Vitals were reviewed and were normal    Monofilament foot exam-passed    Physical Exam   Constitutional: He is oriented to person, place, and time. He appears well-developed and well-nourished. No distress.   HENT:   Head: Normocephalic and atraumatic.   Right Ear: External ear normal.   Left Ear: External ear normal.   Eyes: Conjunctivae are normal.   Cardiovascular: Normal rate, regular rhythm, normal heart " sounds and intact distal pulses. Exam reveals no gallop and no friction rub.   No murmur heard.  Pulmonary/Chest: Effort normal and breath sounds normal. He has no wheezes. He has no rales.   Abdominal: Soft. Bowel sounds are normal.   Musculoskeletal: He exhibits no edema or tenderness.        Right shoulder: He exhibits decreased range of motion (abduction) and pain (deltoid). He exhibits no bony tenderness, no swelling, no effusion, no crepitus, normal pulse and normal strength.   Negative AC joint tenderness, negative cross arm test; normal muscle strength and sensation in UE bilaterally   Neurological: He is alert and oriented to person, place, and time.   Skin: Skin is warm and dry. No rash noted. No erythema.   Psychiatric: He has a normal mood and affect.         Results:      Results from last visit  Results for orders placed or performed in visit on 07/01/19   Hemoglobin A1c ([facility])   Result Value Ref Range    Hemoglobin A1C 10.3 (H) 4.1 - 5.7 %   Lipid Cascade ([facility])   Result Value Ref Range    Cholesterol 122.2 0.0 - 200.0 mg/dL    Cholesterol/HDL Ratio 3.6 0.0 - 5.0    HDL Cholesterol 34.1 (L) >40.0 mg/dL    LDL Cholesterol Calculated 62 0 - 129 mg/dL    Triglycerides 132.9 0.0 - 150.0 mg/dL    VLDL Cholesterol 26.6 7.0 - 32.0 mg/dL   Basic Metabolic Panel ([facility])   Result Value Ref Range    Urea Nitrogen 10.7 7.0 - 21.0 mg/dL    Calcium 8.9 8.5 - 10.1 mg/dL    Chloride 100.6 98.0 - 110.0 mmol/L    Carbon Dioxide 22.4 20.0 - 32.0 mmol/L    Creatinine 0.7 0.7 - 1.3 mg/dL    Glucose 530.2 (HH) 70.0 - 99.0 mg'dL    Potassium 4.3 3.3 - 4.5 mmol/dL    Sodium 133.6 132.6 - 141.4 mmol/L    GFR Estimate >90 >60.0 mL/min/1.7 m2    GFR Estimate If Black >90 >60.0 mL/min/1.7 m2    Narrative    Critical Value was reported to and read back by Dr. Santizo at 7/1/2019 3:28   PM.   Albumin Random Urine Quantitative with Creat Ratio   Result Value Ref Range    Creatinine Urine 76 mg/dL    Albumin Urine mg/L  130 mg/L    Albumin Urine mg/g Cr 170.60 (H) 0 - 17 mg/g Cr       Assessment and Plan      Here is the plan from today's visit:    1. Uncontrolled type 2 diabetes mellitus with hyperglycemia (H)  -pharmacy was involved in patient's care-agreed with their plan to readjust DM medications  -last A1C-10.3  -continue metformin 2000mg daily  -increased lantus from 24 to 26 units daily  -changed novalog-take 10 units before big meals and 5 units after small meals  -continue checking blood sugars fasting (AM) and 2hrs postprandial  -follow up next week with pharamcy-bring glucometer/glucose readings    - insulin glargine (LANTUS SOLOSTAR PEN) 100 UNIT/ML pen; Inject 26 Units Subcutaneous At Bedtime  Dispense: 3 mL; Refill: 3  - insulin aspart (NOVOLOG FLEXPEN) 100 UNIT/ML pen; 10 units before big meals and 5 units after small meals  Dispense: 15 mL; Refill: 3  - insulin pen needle (B-D U/F) 31G X 8 MM miscellaneous; Use 3 daily or as directed.  Dispense: 100 each; Refill: prn  - blood glucose (NO BRAND SPECIFIED) test strip; 1 strip by In Vitro route daily  Dispense: 1 Box; Refill: 2  - blood glucose (NO BRAND SPECIFIED) lancets standard; Use to test blood sugar 3 times daily or as directed.  Dispense: 100 each; Refill: 11    2. Acute shoulder pain due to trauma, right  -no AC joint tenderness, negative cross arm test-less likely AC joint separation  -likely acute muscle sprain vs rotator cuff tendinopathy   -tylenol 1000mg, three times a day as needed for pain  -use ice/heat prn  -return in 2wks if pain has not resolved; will consider PT at this time    - acetaminophen (TYLENOL) 500 MG tablet; Take 2 tablets (1,000 mg) by mouth every 8 hours as needed for mild pain  Dispense: 180 tablet; Refill: 1    Please call or return to clinic if your symptoms don't go away.       Medications Discontinued During This Encounter   Medication Reason     insulin glargine (LANTUS SOLOSTAR PEN) 100 UNIT/ML pen      insulin aspart (NOVOLOG  FLEXPEN) 100 UNIT/ML pen      insulin pen needle (B-D U/F) 31G X 8 MM miscellaneous Reorder     blood glucose (NO BRAND SPECIFIED) test strip Reorder     blood glucose (NO BRAND SPECIFIED) lancets standard Reorder     acetaminophen (TYLENOL) 500 MG tablet Reorder       Options for treatment and follow-up care were reviewed with the patient. Don Niño  engaged in the decision making process and verbalized understanding of the options discussed and agreed with the final plan.    Keith Santizo, DO

## 2019-07-05 NOTE — TELEPHONE ENCOUNTER
RN sent updated diabetic insulin per [facility] formulary change    Fabi Dodd RN

## 2019-07-05 NOTE — PROGRESS NOTES
Preceptor Attestation:   Patient seen, evaluated and discussed with the resident.   I have verified the content of the note, which accurately reflects my assessment of the patient and the plan of care.   Supervising Physician:  Bob Torrez MD

## 2019-07-15 NOTE — PROGRESS NOTES
"SUBJECTIVE/OBJECTIVE:                           Don Niño is a 54 year old male coming in for an initial visit for Medication Therapy Management.  He was referred to me from Dr. Santizo.    Chief Complaint: DM and home blood sugars.  Personal Healthcare Goals: na    Allergies/ADRs: Reviewed in Epic  Tobacco: 0-1 pack per day - is not interested in quitting  Alcohol: never used  Caffeine: NA  Activity: NA  PMH: Reviewed in Epic    Medication Adherence/Access:  no issues reported    DM:   Currently takes 24 units of lantus daily   Novolog use has been inconsistent.  He is taking this at different doses depending on if he takes it before or after the meal.    Patient does not bring in his glucometer to the visit today.  He is unclear as to what his BS were at home.    Insulin dose was not changed at the ED visit.      Lab Results   Component Value Date    A1C 10.3 07/01/2019    A1C 8.0 12/06/2016    A1C 8.8 08/17/2016    A1C 8.8 05/11/2016    A1C 8.2 10/16/2015         Today's Vitals: There were no vitals taken for this visit.   BP Readings from Last 1 Encounters:   07/05/19 117/80     Pulse Readings from Last 1 Encounters:   07/05/19 101     Wt Readings from Last 1 Encounters:   07/05/19 94.9 kg (209 lb 3.2 oz)     Ht Readings from Last 1 Encounters:   07/05/19 1.79 m (5' 10.47\")     Estimated body mass index is 29.62 kg/m  as calculated from the following:    Height as of an earlier encounter on 7/5/19: 1.79 m (5' 10.47\").    Weight as of an earlier encounter on 7/5/19: 94.9 kg (209 lb 3.2 oz).    Temp Readings from Last 1 Encounters:   07/05/19 98.9  F (37.2  C) (Oral)           ASSESSMENT:                             Current medications were reviewed today.     Medication Adherence: excellent, no issues identified    DM:   Recommend to increase insulin today at a modest rate. As we do not have home BS readings today, we have spent our time increasing the LA dose by 2 units and providing education to the " patient on how to use the novolog insulin.  Pt seeks to have some control over the dose to match his meals.  We have provided a simple method to give 10 units of novolog before his large meals, but only 5 units when he is to eat a small meal.    :   :   :   :     PLAN:                            Will follow up with Pharmacy services in one week    I spent 40 minutes with this patient today. All changes were made via collaborative practice agreement with Dr. Santizo and Lore. Discussed pt with both provider and supervising physician today in clinic.    Will follow up in 1 week with pharmacy services .    The patient was given a summary of these recommendations as an after visit summary.     Jean-Claude Llanes, Pharm.D.

## 2019-07-30 DIAGNOSIS — E11.9 TYPE 2 DIABETES MELLITUS WITHOUT COMPLICATION, WITH LONG-TERM CURRENT USE OF INSULIN (H): ICD-10-CM

## 2019-07-30 DIAGNOSIS — J30.1 SEASONAL ALLERGIC RHINITIS DUE TO POLLEN: ICD-10-CM

## 2019-07-30 DIAGNOSIS — I21.4 NON-ST ELEVATION (NSTEMI) MYOCARDIAL INFARCTION (H): ICD-10-CM

## 2019-07-30 DIAGNOSIS — Z79.4 TYPE 2 DIABETES MELLITUS WITHOUT COMPLICATION, WITH LONG-TERM CURRENT USE OF INSULIN (H): ICD-10-CM

## 2019-07-30 DIAGNOSIS — I10 ESSENTIAL HYPERTENSION WITH GOAL BLOOD PRESSURE LESS THAN 140/90: ICD-10-CM

## 2019-07-31 RX ORDER — LISINOPRIL 5 MG/1
5 TABLET ORAL DAILY
Qty: 30 TABLET | Refills: 0 | Status: SHIPPED | OUTPATIENT
Start: 2019-07-31 | End: 2019-09-04

## 2019-07-31 RX ORDER — LORATADINE 10 MG/1
10 CAPSULE, LIQUID FILLED ORAL DAILY
Qty: 30 CAPSULE | Refills: 0 | Status: SHIPPED | OUTPATIENT
Start: 2019-07-31 | End: 2019-09-24

## 2019-07-31 RX ORDER — METFORMIN HCL 500 MG
2000 TABLET, EXTENDED RELEASE 24 HR ORAL DAILY
Qty: 120 TABLET | Refills: 0 | Status: SHIPPED | OUTPATIENT
Start: 2019-07-31 | End: 2019-09-04

## 2019-08-23 DIAGNOSIS — E11.65 UNCONTROLLED TYPE 2 DIABETES MELLITUS WITH HYPERGLYCEMIA (H): ICD-10-CM

## 2019-08-23 NOTE — TELEPHONE ENCOUNTER
Rx-Script refill for Lantus approved called patient to notify to schedule follow up appointment in 1-3 days as recommended per last clinic visit, Pt verbalized and agreed with the plan.    Message routed to  call center to follow up and assist patient schedule clinic appointment.    Kiko Jacobson RN

## 2019-08-23 NOTE — TELEPHONE ENCOUNTER
"Pt was last seen in clinic by 07/01/2019 and placed on Lantus insulin on 0705/2019, with a plan of care \"to go to ER after clinic visit to lower glucose level safely. Please make appt at clinic in the next 1-3 days to go over diabetes medications\" MD Sukhdev.    Refill request routed to PCP/provider last seen in clinic.    Kiko Jacobson RN    "

## 2019-08-23 NOTE — TELEPHONE ENCOUNTER
Verify that the refill encounter hasn't been started Yes    [facility] phone call message- patient requesting a refill: patient calling in to request the medication. He doesn't know the name of the med. He says, one is Novolog and other in bed time injection. He changed the pharmacy to the following address. He wants all the medication to be sent to the following pharmacy.    Full Medication Name: insulin aspart (NOVOLOG FLEXPEN) 100 UNIT/ML pen    Dose: 10 units before big meals and 5 units after small meals     [facility] [address]      : Yes    Medication tab checked to see if medication has been sent  Yes    Additional Comments: patient is completely out medication.     OK to leave a message on voice mail? Yes    Advised patient refill may take up to 2 business days? Yes    Primary language: Ecuadorean      needed? No    Call taken on August 23, 2019 at 4:46 PM by Petrona Farrell    Route to P [facility] MED REFILL

## 2019-09-03 DIAGNOSIS — I21.4 NON-ST ELEVATION (NSTEMI) MYOCARDIAL INFARCTION (H): ICD-10-CM

## 2019-09-03 DIAGNOSIS — I10 ESSENTIAL HYPERTENSION WITH GOAL BLOOD PRESSURE LESS THAN 140/90: ICD-10-CM

## 2019-09-03 DIAGNOSIS — Z79.4 TYPE 2 DIABETES MELLITUS WITHOUT COMPLICATION, WITH LONG-TERM CURRENT USE OF INSULIN (H): ICD-10-CM

## 2019-09-03 DIAGNOSIS — E11.9 TYPE 2 DIABETES MELLITUS WITHOUT COMPLICATION, WITH LONG-TERM CURRENT USE OF INSULIN (H): ICD-10-CM

## 2019-09-04 RX ORDER — LISINOPRIL 5 MG/1
5 TABLET ORAL DAILY
Qty: 30 TABLET | Refills: 0 | Status: SHIPPED | OUTPATIENT
Start: 2019-09-04 | End: 2019-10-07

## 2019-09-04 RX ORDER — METFORMIN HCL 500 MG
2000 TABLET, EXTENDED RELEASE 24 HR ORAL DAILY
Qty: 120 TABLET | Refills: 0 | Status: SHIPPED | OUTPATIENT
Start: 2019-09-04 | End: 2019-09-30

## 2019-09-24 DIAGNOSIS — J30.1 SEASONAL ALLERGIC RHINITIS DUE TO POLLEN: ICD-10-CM

## 2019-09-24 RX ORDER — LORATADINE 10 MG/1
10 CAPSULE, LIQUID FILLED ORAL DAILY
Qty: 30 CAPSULE | Refills: 0 | Status: SHIPPED | OUTPATIENT
Start: 2019-09-24 | End: 2019-09-30

## 2019-09-30 DIAGNOSIS — Z79.4 TYPE 2 DIABETES MELLITUS WITHOUT COMPLICATION, WITH LONG-TERM CURRENT USE OF INSULIN (H): ICD-10-CM

## 2019-09-30 DIAGNOSIS — J30.1 SEASONAL ALLERGIC RHINITIS DUE TO POLLEN: ICD-10-CM

## 2019-09-30 DIAGNOSIS — E11.9 TYPE 2 DIABETES MELLITUS WITHOUT COMPLICATION, WITH LONG-TERM CURRENT USE OF INSULIN (H): ICD-10-CM

## 2019-09-30 RX ORDER — METFORMIN HCL 500 MG
2000 TABLET, EXTENDED RELEASE 24 HR ORAL DAILY
Qty: 120 TABLET | Refills: 0 | Status: SHIPPED | OUTPATIENT
Start: 2019-09-30 | End: 2019-11-06

## 2019-09-30 RX ORDER — LORATADINE 10 MG/1
10 CAPSULE, LIQUID FILLED ORAL DAILY
Qty: 30 CAPSULE | Refills: 0 | Status: SHIPPED | OUTPATIENT
Start: 2019-09-30 | End: 2020-01-11

## 2019-10-07 DIAGNOSIS — Z79.4 TYPE 2 DIABETES MELLITUS WITHOUT COMPLICATION, WITH LONG-TERM CURRENT USE OF INSULIN (H): ICD-10-CM

## 2019-10-07 DIAGNOSIS — I10 ESSENTIAL HYPERTENSION WITH GOAL BLOOD PRESSURE LESS THAN 140/90: ICD-10-CM

## 2019-10-07 DIAGNOSIS — E11.9 TYPE 2 DIABETES MELLITUS WITHOUT COMPLICATION, WITH LONG-TERM CURRENT USE OF INSULIN (H): ICD-10-CM

## 2019-10-07 DIAGNOSIS — I21.4 NON-ST ELEVATION (NSTEMI) MYOCARDIAL INFARCTION (H): ICD-10-CM

## 2019-10-07 RX ORDER — LISINOPRIL 5 MG/1
5 TABLET ORAL DAILY
Qty: 30 TABLET | Refills: 0 | Status: SHIPPED | OUTPATIENT
Start: 2019-10-07 | End: 2019-11-06

## 2019-10-11 DIAGNOSIS — E11.65 UNCONTROLLED TYPE 2 DIABETES MELLITUS WITH HYPERGLYCEMIA (H): ICD-10-CM

## 2019-10-11 NOTE — TELEPHONE ENCOUNTER

## 2019-11-06 DIAGNOSIS — I10 BENIGN ESSENTIAL HYPERTENSION: ICD-10-CM

## 2019-11-06 DIAGNOSIS — Z79.4 TYPE 2 DIABETES MELLITUS WITHOUT COMPLICATION, WITH LONG-TERM CURRENT USE OF INSULIN (H): ICD-10-CM

## 2019-11-06 DIAGNOSIS — I21.4 NON-ST ELEVATION (NSTEMI) MYOCARDIAL INFARCTION (H): ICD-10-CM

## 2019-11-06 DIAGNOSIS — I10 ESSENTIAL HYPERTENSION WITH GOAL BLOOD PRESSURE LESS THAN 140/90: ICD-10-CM

## 2019-11-06 DIAGNOSIS — E11.9 TYPE 2 DIABETES MELLITUS WITHOUT COMPLICATION, WITH LONG-TERM CURRENT USE OF INSULIN (H): ICD-10-CM

## 2019-11-06 RX ORDER — METOPROLOL SUCCINATE 50 MG/1
50 TABLET, EXTENDED RELEASE ORAL DAILY
Qty: 30 TABLET | Refills: 3 | Status: SHIPPED | OUTPATIENT
Start: 2019-11-06 | End: 2019-11-08

## 2019-11-06 RX ORDER — METFORMIN HCL 500 MG
2000 TABLET, EXTENDED RELEASE 24 HR ORAL DAILY
Qty: 120 TABLET | Refills: 0 | Status: SHIPPED | OUTPATIENT
Start: 2019-11-06 | End: 2019-11-08

## 2019-11-06 RX ORDER — LISINOPRIL 5 MG/1
5 TABLET ORAL DAILY
Qty: 30 TABLET | Refills: 0 | Status: SHIPPED | OUTPATIENT
Start: 2019-11-06 | End: 2019-11-08

## 2019-11-08 RX ORDER — LISINOPRIL 5 MG/1
5 TABLET ORAL DAILY
Qty: 30 TABLET | Refills: 0 | Status: SHIPPED | OUTPATIENT
Start: 2019-11-08 | End: 2019-12-11

## 2019-11-08 RX ORDER — METOPROLOL SUCCINATE 50 MG/1
50 TABLET, EXTENDED RELEASE ORAL DAILY
Qty: 30 TABLET | Refills: 3 | Status: SHIPPED | OUTPATIENT
Start: 2019-11-08 | End: 2020-03-12

## 2019-11-08 RX ORDER — METFORMIN HCL 500 MG
2000 TABLET, EXTENDED RELEASE 24 HR ORAL DAILY
Qty: 120 TABLET | Refills: 0 | Status: SHIPPED | OUTPATIENT
Start: 2019-11-08 | End: 2019-12-11

## 2019-11-29 ENCOUNTER — TELEPHONE (OUTPATIENT)
Dept: FAMILY MEDICINE | Facility: CLINIC | Age: 54
End: 2019-11-29

## 2019-11-29 DIAGNOSIS — E11.65 UNCONTROLLED TYPE 2 DIABETES MELLITUS WITH HYPERGLYCEMIA (H): ICD-10-CM

## 2019-11-29 NOTE — TELEPHONE ENCOUNTER
Received refill request for insulin glargine (LANTUS PEN) 100 UNIT/ML pen, Rx-script was last written on 10/16/2019, with 3 refills, called place to pharmacy to request to prepare prescript for dispense, patient notified.    Kiko Jacobson RN,

## 2019-11-29 NOTE — TELEPHONE ENCOUNTER
Verify that the refill encounter hasn't been started Yes    [facility] phone call message- patient requesting a refill:    Full Medication Name: insulin glargine (LANTUS PEN) 100 UNIT/ML pen    Dose: Inject 26 Units Subcutaneous At Bedtime - Subcutaneous     Pharmacy confirmed as   [facility] [place], [place] - [address]  [facility] [address]  Phone: [phone] Fax: [phone]  : Yes    Medication tab checked to see if medication has been sent  Yes    Additional Comments: Patient is out of medication.     OK to leave a message on voice mail? Yes    Advised patient refill may take up to 2 business days? Yes    Primary language: Monegasque      needed? No    Call taken on November 29, 2019 at 2:14 PM by Stephanie Villarreal    Route to [facility] MED REFILL

## 2019-12-11 DIAGNOSIS — E11.9 TYPE 2 DIABETES MELLITUS WITHOUT COMPLICATION, WITH LONG-TERM CURRENT USE OF INSULIN (H): ICD-10-CM

## 2019-12-11 DIAGNOSIS — Z79.4 TYPE 2 DIABETES MELLITUS WITHOUT COMPLICATION, WITH LONG-TERM CURRENT USE OF INSULIN (H): ICD-10-CM

## 2019-12-11 DIAGNOSIS — I10 ESSENTIAL HYPERTENSION WITH GOAL BLOOD PRESSURE LESS THAN 140/90: ICD-10-CM

## 2019-12-11 DIAGNOSIS — I21.4 NON-ST ELEVATION (NSTEMI) MYOCARDIAL INFARCTION (H): ICD-10-CM

## 2019-12-11 RX ORDER — LISINOPRIL 5 MG/1
5 TABLET ORAL DAILY
Qty: 30 TABLET | Refills: 0 | Status: SHIPPED | OUTPATIENT
Start: 2019-12-11 | End: 2020-01-11

## 2019-12-11 RX ORDER — METFORMIN HCL 500 MG
2000 TABLET, EXTENDED RELEASE 24 HR ORAL DAILY
Qty: 120 TABLET | Refills: 0 | Status: SHIPPED | OUTPATIENT
Start: 2019-12-11 | End: 2020-01-11

## 2019-12-31 ENCOUNTER — OFFICE VISIT (OUTPATIENT)
Dept: FAMILY MEDICINE | Facility: CLINIC | Age: 54
End: 2019-12-31
Payer: COMMERCIAL

## 2019-12-31 VITALS
HEIGHT: 70 IN | OXYGEN SATURATION: 97 % | RESPIRATION RATE: 16 BRPM | BODY MASS INDEX: 29.81 KG/M2 | WEIGHT: 208.2 LBS | HEART RATE: 100 BPM | DIASTOLIC BLOOD PRESSURE: 76 MMHG | SYSTOLIC BLOOD PRESSURE: 119 MMHG | TEMPERATURE: 98.2 F

## 2019-12-31 DIAGNOSIS — Z79.4 TYPE 2 DIABETES MELLITUS WITHOUT COMPLICATION, WITH LONG-TERM CURRENT USE OF INSULIN (H): Primary | ICD-10-CM

## 2019-12-31 DIAGNOSIS — Z23 NEED FOR PROPHYLACTIC VACCINATION AND INOCULATION AGAINST INFLUENZA: ICD-10-CM

## 2019-12-31 DIAGNOSIS — E11.9 TYPE 2 DIABETES MELLITUS WITHOUT COMPLICATION, WITH LONG-TERM CURRENT USE OF INSULIN (H): Primary | ICD-10-CM

## 2019-12-31 LAB
BUN SERPL-MCNC: 7.3 MG/DL (ref 7–21)
CALCIUM SERPL-MCNC: 9.9 MG/DL (ref 8.5–10.1)
CHLORIDE SERPLBLD-SCNC: 103 MMOL/L (ref 98–110)
CO2 SERPL-SCNC: 22.6 MMOL/L (ref 20–32)
CREAT SERPL-MCNC: 0.7 MG/DL (ref 0.7–1.3)
GFR SERPL CREATININE-BSD FRML MDRD: >90 ML/MIN/1.7 M2
GLUCOSE SERPL-MCNC: 180.5 MG'DL (ref 70–99)
HBA1C MFR BLD: 10 % (ref 4.1–5.7)
POTASSIUM SERPL-SCNC: 4 MMOL/DL (ref 3.3–4.5)
SODIUM SERPL-SCNC: 137.1 MMOL/L (ref 132.6–141.4)
T4 FREE SERPL-MCNC: 1.14 NG/DL (ref 0.76–1.46)
TSH SERPL DL<=0.005 MIU/L-ACNC: 5.23 MU/L (ref 0.4–4)

## 2019-12-31 ASSESSMENT — MIFFLIN-ST. JEOR: SCORE: 1795.64

## 2019-12-31 NOTE — PROGRESS NOTES
HPI       Don Niño is a 54 year old  who presents for   Chief Complaint   Patient presents with     RECHECK     DM, when he eats numbers tend to go up otherwise they are ok per pt, he has been having problems with his machine that when he put the blood on the test strip the machine reads ER     Refill Request     Cetafil lotion     Imm/Inj     Flu Shot     Diabetes Follow-up   On lantus 16u (runs out frequently), metformin 2000mg daily, and novolog 12u TID.   Requesting one touch    Patient is checking blood sugars: twice daily.    Blood sugar testing frequency justification: On insulin, frequency appropriate  and Uncontrolled diabetes  Results are as follows:         Fasting, and random          -Last A1C was   Lab Results   Component Value Date    A1C 10.0 12/31/2019    A1C 10.3 07/01/2019    A1C 8.0 12/06/2016    A1C 8.8 08/17/2016    A1C 8.8 05/11/2016        Diabetic concerns: Frequently running out of meds    Chest Pain or exercise related calf pain (claudication):no     Symptoms of hypoglycemia (low blood sugar): none     Paresthesias (numbness or burning in feet) or sores: No     Diabetic eye exam within the last year?: Yes      Adherence and Exercise  Medication side effects: no  How often is a medication missed? More than 3 times per week  Exercise:walking  2-3 days/week for an average of 30-45 minutes      +++++++    Problem, Medication and Allergy Lists were reviewed and updated if needed..    Patient is an established patient of this clinic..         Review of Systems:   Review of Systems  Skin: negative except as above  Respiratory: negative except as above  Cardiovascular: negative except as above  Gastrointestinal: negative except as above  Genitourinary: negative except as above  Musculoskeletal: negative except as above  Neurologic: negative except as above  Psychiatric: negative except as above  Hematologic/Lymphatic/Immunologic: negative except as above  Endocrine: negative except  "as above         Physical Exam:     Vitals:    12/31/19 0918   BP: 119/76   BP Location: Left arm   Patient Position: Sitting   Cuff Size: Adult Regular   Pulse: 100   Resp: 16   Temp: 98.2  F (36.8  C)   TempSrc: Oral   SpO2: 97%   Weight: 94.4 kg (208 lb 3.2 oz)   Height: 1.778 m (5' 10\")     Body mass index is 29.87 kg/m .  Vitals were reviewed and were normal     Physical Exam  Constitutional: Oriented to person, place, and time. Appears well-developed and well-nourished.   HENT:   Head: Normocephalic and atraumatic.   Eyes: Conjunctivae are normal.   Neck: Normal range of motion.   Cardiovascular: Normal rate, regular rhythm, normal heart sounds and intact distal pulses.    Pulmonary/Chest: Effort normal and breath sounds normal. No respiratory distress. No wheezes.   Musculoskeletal: Normal range of motion.   Neurological: Alert and oriented to person, place, and time.   Skin: Skin is warm and dry.   Psychiatric: Has a normal mood and affect. Behavior is normal.       Results:      Results from this visit  Results for orders placed or performed in visit on 12/31/19   Hemoglobin A1c ([facility])     Status: Abnormal   Result Value Ref Range    Hemoglobin A1C 10.0 (H) 4.1 - 5.7 %   HIV Antigen Antibody Combo     Status: None   Result Value Ref Range    HIV Antigen Antibody Combo Nonreactive NR^Nonreactive       Basic Metabolic Panel ([facility])     Status: Abnormal   Result Value Ref Range    Calcium 9.9 8.5 - 10.1 mg/dL    Chloride 103.0 98.0 - 110.0 mmol/L    Carbon Dioxide 22.6 20.0 - 32.0 mmol/L    Creatinine 0.7 0.7 - 1.3 mg/dL    Glucose 180.5 (H) 70.0 - 99.0 mg'dL    Potassium 4.0 3.3 - 4.5 mmol/dL    Sodium 137.1 132.6 - 141.4 mmol/L    GFR Estimate >90 >60.0 mL/min/1.7 m2    GFR Estimate If Black >90 >60.0 mL/min/1.7 m2    Urea Nitrogen 7.3 7.0 - 21.0 mg/dL   TSH with free T4 reflex     Status: Abnormal   Result Value Ref Range    TSH 5.23 (H) 0.40 - 4.00 mU/L   T4 free     Status: None   Result Value Ref " Range    T4 Free 1.14 0.76 - 1.46 ng/dL       Assessment and Plan        1. Type 2 diabetes mellitus without complication, with long-term current use of insulin  Repeat labs today and discussed plan with PharmD regarding current lantus and novolog regimen. Refills were ordered and dose of lantus was increased to 30 per pharmD.    - Hemoglobin A1c ([facility])  - HIV Antigen Antibody Combo  - Basic Metabolic Panel ([facility])  - TSH with free T4 reflex  - insulin glargine (LANTUS PEN) 100 UNIT/ML pen; Inject 30 Units Subcutaneous At Bedtime  Dispense: 9 mL; Refill: 3  - blood glucose monitoring (NO BRAND SPECIFIED) meter device kit; Use to test blood sugar 2-4 times daily or as directed.  Dispense: 1 kit; Refill: 11  - blood glucose (NO BRAND SPECIFIED) test strip; Use to test blood sugar 2-4 times daily or as directed.  Dispense: 100 each; Refill: 11  - T4 free    2. Need for prophylactic vaccination and inoculation against influenza  - Fluzone quad, preserve-free/prefilled  0.5ml, 6+ months [14212]    Recommend close follow up in 2 weeks for DM follow up       Medications Discontinued During This Encounter   Medication Reason     insulin glargine (LANTUS PEN) 100 UNIT/ML pen      blood glucose monitoring (NO BRAND SPECIFIED) meter device kit      blood glucose (NO BRAND SPECIFIED) test strip      acetaminophen (TYLENOL) 500 MG tablet        Options for treatment and follow-up care were reviewed with the patient. Don Niño  engaged in the decision making process and verbalized understanding of the options discussed and agreed with the final plan.    Maunel Schneider MD

## 2019-12-31 NOTE — LETTER
January 8, 2020      Don Niño  [address]  [place] [address]        Dear Don,    Thank you for getting your care at [facility]. Please see below for your test results.    Resulted Orders   Hemoglobin A1c (\[facility]\"")   Result Value Ref Range    Hemoglobin A1C 10.0 (H) 4.1 - 5.7 %   Basic Metabolic Panel (\[facility]\"")   Result Value Ref Range    Calcium 9.9 8.5 - 10.1 mg/dL    Chloride 103.0 98.0 - 110.0 mmol/L    Carbon Dioxide 22.6 20.0 - 32.0 mmol/L    Creatinine 0.7 0.7 - 1.3 mg/dL    Glucose 180.5 (H) 70.0 - 99.0 mg'dL    Potassium 4.0 3.3 - 4.5 mmol/dL    Sodium 137.1 132.6 - 141.4 mmol/L    GFR Estimate >90 >60.0 mL/min/1.7 m2    GFR Estimate If Black >90 >60.0 mL/min/1.7 m2    Urea Nitrogen 7.3 7.0 - 21.0 mg/dL   TSH with free T4 reflex   Result Value Ref Range    TSH 5.23 (H) 0.40 - 4.00 mU/L   T4 free   Result Value Ref Range    T4 Free 1.14 0.76 - 1.46 ng/dL       As we discussed at our last visit, please follow up with a clinic appointment to review these results further.    Sincerely,    Manuel Schneider MD

## 2020-01-02 LAB — HIV 1+2 AB+HIV1 P24 AG SERPL QL IA: NONREACTIVE

## 2020-01-09 NOTE — PROGRESS NOTES
Preceptor Attestation:   Patient seen, evaluated and discussed with the resident. I have verified the content of the note, which accurately reflects my assessment of the patient and the plan of care.   Supervising Physician:  Deepak Jj MD

## 2020-01-14 ENCOUNTER — OFFICE VISIT (OUTPATIENT)
Dept: FAMILY MEDICINE | Facility: CLINIC | Age: 55
End: 2020-01-14
Payer: COMMERCIAL

## 2020-01-14 VITALS
HEIGHT: 70 IN | DIASTOLIC BLOOD PRESSURE: 77 MMHG | BODY MASS INDEX: 30.01 KG/M2 | RESPIRATION RATE: 16 BRPM | HEART RATE: 107 BPM | WEIGHT: 209.6 LBS | OXYGEN SATURATION: 96 % | SYSTOLIC BLOOD PRESSURE: 139 MMHG | TEMPERATURE: 98.3 F

## 2020-01-14 DIAGNOSIS — E11.9 TYPE 2 DIABETES MELLITUS WITHOUT COMPLICATION, WITH LONG-TERM CURRENT USE OF INSULIN (H): Primary | ICD-10-CM

## 2020-01-14 DIAGNOSIS — Z79.4 TYPE 2 DIABETES MELLITUS WITHOUT COMPLICATION, WITH LONG-TERM CURRENT USE OF INSULIN (H): Primary | ICD-10-CM

## 2020-01-14 RX ORDER — BLOOD-GLUCOSE METER
EACH MISCELLANEOUS
Qty: 1 KIT | Refills: 0 | Status: SHIPPED | OUTPATIENT
Start: 2020-01-14 | End: 2022-02-02

## 2020-01-14 ASSESSMENT — MIFFLIN-ST. JEOR: SCORE: 1804.48

## 2020-01-14 NOTE — PATIENT INSTRUCTIONS
Here is the plan from today's visit    1. Type 2 diabetes mellitus without complication, with long-term current use of insulin (H)  - blood glucose monitoring (ONE TOUCH ULTRA 2) meter device kit; Use to test blood sugar 3 times daily or as directed.  Dispense: 1 kit; Refill: 0  - OPTHALMOLOGY ADULT REFERRAL - INTERNAL    Please call or return to clinic if your symptoms don't go away.    Follow up plan  Please make a clinic appointment for follow up with me (LAN WALLS) in 2  weeks for follow up visit.    Thank you for coming to [facility] today.  Lab Testing:  **If you had lab testing today and your results are reassuring or normal they will be mailed to you or sent through [facility] within 7 days.   **If the lab tests need quick action we will call you with the results.  The phone number we will call with results is # [phone] (home) [phone] (work). If this is not the best number please call our clinic and change the number.  Medication Refills:  If you need any refills please call your pharmacy and they will contact us.   If you need to  your refill at a new pharmacy, please contact the new pharmacy directly. The new pharmacy will help you get your medications transferred faster.   Scheduling:  If you have any concerns about today's visit or wish to schedule another appointment please call our office during normal business hours [phone] (8-5:00 M-F)  If a referral was made to a [facility] and you don't get a call from central scheduling please call [phone].  If a Mammogram was ordered for you at [facility] call [phone] to schedule or change your appointment.  If you had an XRay/CT/Ultrasound/MRI ordered the number is [phone] to schedule or change your radiology appointment.   Medical Concerns:  If you have urgent medical concerns please call [phone] at any time of the day.    Lan Walls MD

## 2020-01-14 NOTE — PROGRESS NOTES
Assessment and Plan      Don was seen today for follow up.    Diagnoses and all orders for this visit:    Type 2 diabetes mellitus without complication, with long-term current use of insulin (H)  Sugars appear to be well controlled, though he did not bring his meter with him today. Will have him bring it to next appointment. Ordered new meter per patient request. Foot exam done today and within normal limits. Due for eye appointment, so referral placed as well. Follow-up in 1 month with meter, and in 2 months for repeat A1c.  -     blood glucose monitoring (ONE TOUCH ULTRA 2) meter device kit; Use to test blood sugar 3 times daily or as directed.  -     OPTHALMOLOGY ADULT REFERRAL - INTERNAL           HPI     Don Niño is a 55 year old year old male with a history of  has a past medical history of CAD (coronary artery disease), Diabetes mellitus (H), Hyperlipidaemia, and Hypertension. who presents for Follow Up    Here for follow-up of his diabetes today as his A1c was elevated at 10.0 on 12/31/2019. At that time his insulin was increased to 30 units per pharmD. Reports missing only 1-2 nights of insulin. Misses metformin rarely too. Denies any issues with his medications. Did not bring in his glucometer today and isn't sure exactly what his sugars have been measuring, but thinks they are about 150 usually after he eats and about 100-110 before he eats. Denies any hypoglycemic symptoms. He is requesting a onetouch ultra meter today as he had heard this works well.     Diabetic Foot Screen:  Any complaints of increased pain or numbness ? No   Is there a foot ulcer now or a history of foot ulcer? No   Does the foot have an abnormal shape?  YES has corn on lateral first toe   Are the nails thick, too long or ingrown? No   Are there any redness or open areas? No            Sensation Testing done at all points on the diagram with monofilament     Right Foot: Sensation Normal at all points  Left Foot:  "Sensation Normal at all points     Risk Category: 0- No loss of protective sensation  Performed by Lan Walls MD        +++++++    Problem, Medication and Allergy Lists were reviewed and updated if needed.    Patient is an established patient of this clinic.         Review of Systems:   Review of Systems  A 6 point review of systems was performed and was negative except as noted above.         Physical Exam:   /77   Pulse 107   Temp 98.3  F (36.8  C) (Oral)   Resp 16   Ht 1.79 m (5' 10.47\")   Wt 95.1 kg (209 lb 9.6 oz)   SpO2 96%   BMI 29.67 kg/m    Body mass index is 29.67 kg/m .    Vital signs normal except slight tachycardia     Physical Exam  Constitutional:       General: He is not in acute distress.     Appearance: He is well-developed.   HENT:      Head: Normocephalic and atraumatic.   Eyes:      General: No scleral icterus.     Conjunctiva/sclera: Conjunctivae normal.   Cardiovascular:      Rate and Rhythm: Normal rate and regular rhythm.      Heart sounds: Normal heart sounds. No murmur. No friction rub. No gallop.    Pulmonary:      Effort: Pulmonary effort is normal. No respiratory distress.      Breath sounds: Normal breath sounds.   Neurological:      Mental Status: He is alert and oriented to person, place, and time.             Results:   No testing ordered today     There are no discontinued medications.    Options for treatment and follow-up care were reviewed with the patient. Don Niño engaged in the decision making process and verbalized understanding of the options discussed and agreed with the final plan.    Ritesh Walls MD    "

## 2020-01-23 ENCOUNTER — TELEPHONE (OUTPATIENT)
Dept: FAMILY MEDICINE | Facility: CLINIC | Age: 55
End: 2020-01-23

## 2020-01-23 NOTE — TELEPHONE ENCOUNTER
RN called via language line to check in after recent dm visit. Has follow up scheduled.     transfer to RN when calls back    Fabi LIZETTE Dodd

## 2020-02-14 DIAGNOSIS — E11.9 TYPE 2 DIABETES MELLITUS WITHOUT COMPLICATION, WITH LONG-TERM CURRENT USE OF INSULIN (H): ICD-10-CM

## 2020-02-14 DIAGNOSIS — Z79.4 TYPE 2 DIABETES MELLITUS WITHOUT COMPLICATION, WITH LONG-TERM CURRENT USE OF INSULIN (H): ICD-10-CM

## 2020-02-14 DIAGNOSIS — I21.4 NON-ST ELEVATION (NSTEMI) MYOCARDIAL INFARCTION (H): ICD-10-CM

## 2020-02-14 DIAGNOSIS — I10 ESSENTIAL HYPERTENSION WITH GOAL BLOOD PRESSURE LESS THAN 140/90: ICD-10-CM

## 2020-02-14 RX ORDER — METFORMIN HCL 500 MG
2000 TABLET, EXTENDED RELEASE 24 HR ORAL DAILY
Qty: 120 TABLET | Refills: 0 | Status: SHIPPED | OUTPATIENT
Start: 2020-02-14 | End: 2020-03-26

## 2020-02-14 RX ORDER — LISINOPRIL 5 MG/1
5 TABLET ORAL DAILY
Qty: 30 TABLET | Refills: 0 | Status: SHIPPED | OUTPATIENT
Start: 2020-02-14 | End: 2020-03-26

## 2020-02-14 NOTE — TELEPHONE ENCOUNTER
Lisinopril 5mg    LAST FILLED DATE: 01-  LAST FILLED STRENGTH: 5 MG  LAST FILLED QTY: 30  LAST OFFICE VISIT: 01-    Metformin ER 500mg    LAST FILLED DATE: 01-  LAST FILLED STRENGTH: 500 MG  LAST FILLED QTY: 120  LAST OFFICE VISIT: 01-    Patricia ALVARENGA CPhT @    [facility]  [address] [place] [address]  Phone: [phone]  Fax: [phone]

## 2020-02-14 NOTE — TELEPHONE ENCOUNTER
1 month limited refills. Patient needs clinic visit before any more refills.  Letter sent.  Anthony Lim DO

## 2020-02-14 NOTE — LETTER
[facility]  [address],  [address]  [place] [address]  Phone: [phone]  Fax: [phone]     February 14, 2020      Don Niño  [address]  [place] [address]        Dear Don,      A request for a refill on your metformin, lisinopril prescription was sent to us from your pharmacy. I have notified the pharmacy to allow you one more refill. It is time for your recheck at the clinic so we can monitor the medication and continue to prescribe it safely. Please make clinic appointment with me or another provider at [facility] in the next one month    Thank you for choosing us as your healthcare provider.      Sincerely,    Anthony Lim, DO

## 2020-03-10 DIAGNOSIS — I10 BENIGN ESSENTIAL HYPERTENSION: ICD-10-CM

## 2020-03-10 DIAGNOSIS — E11.65 UNCONTROLLED TYPE 2 DIABETES MELLITUS WITH HYPERGLYCEMIA (H): ICD-10-CM

## 2020-03-12 RX ORDER — INSULIN ASPART 100 [IU]/ML
INJECTION, SOLUTION INTRAVENOUS; SUBCUTANEOUS
Qty: 15 ML | Refills: 0 | Status: SHIPPED | OUTPATIENT
Start: 2020-03-12 | End: 2020-03-27

## 2020-03-12 RX ORDER — METOPROLOL SUCCINATE 50 MG/1
50 TABLET, EXTENDED RELEASE ORAL DAILY
Qty: 30 TABLET | Refills: 3 | Status: SHIPPED | OUTPATIENT
Start: 2020-03-12 | End: 2020-07-09

## 2020-03-27 ENCOUNTER — VIRTUAL VISIT (OUTPATIENT)
Dept: FAMILY MEDICINE | Facility: CLINIC | Age: 55
End: 2020-03-27
Payer: COMMERCIAL

## 2020-03-27 DIAGNOSIS — Z79.4 TYPE 2 DIABETES MELLITUS WITH OTHER CIRCULATORY COMPLICATION, WITH LONG-TERM CURRENT USE OF INSULIN (H): ICD-10-CM

## 2020-03-27 DIAGNOSIS — E11.59 TYPE 2 DIABETES MELLITUS WITH OTHER CIRCULATORY COMPLICATION, WITH LONG-TERM CURRENT USE OF INSULIN (H): ICD-10-CM

## 2020-03-27 DIAGNOSIS — N52.9 VASCULOGENIC ERECTILE DYSFUNCTION, UNSPECIFIED VASCULOGENIC ERECTILE DYSFUNCTION TYPE: ICD-10-CM

## 2020-03-27 DIAGNOSIS — L85.3 DRY SKIN: ICD-10-CM

## 2020-03-27 DIAGNOSIS — E11.65 UNCONTROLLED TYPE 2 DIABETES MELLITUS WITH HYPERGLYCEMIA (H): Primary | ICD-10-CM

## 2020-03-27 RX ORDER — VIT E ACET/GLY/DIMETH/WATER
LOTION (ML) TOPICAL PRN
Qty: 250 ML | Refills: 11 | Status: SHIPPED | OUTPATIENT
Start: 2020-03-27

## 2020-03-27 RX ORDER — INSULIN ASPART 100 [IU]/ML
INJECTION, SOLUTION INTRAVENOUS; SUBCUTANEOUS
Qty: 15 ML | Refills: 0 | Status: SHIPPED | OUTPATIENT
Start: 2020-03-27 | End: 2020-04-28

## 2020-03-27 NOTE — PROGRESS NOTES
Preceptor Attestation:   Patient discussed with the resident. Assessment and plan reviewed with resident and agreed upon.   Supervising Physician:  Flaquito Broussard MD  [facility]

## 2020-03-27 NOTE — PATIENT INSTRUCTIONS
Here is the plan from today's visit    1. Dry skin    - Cetaphil Moisturizing (CETAPHIL) external lotion; Apply topically as needed for dry skin  Dispense: 250 mL; Refill: 11    2. Uncontrolled type 2 diabetes mellitus with hyperglycemia (H)  Increase your novolog insulin to 14 units with meals.  Keep your lantus insulin at night at 30 units.  Keep up the good work with your meds and exercise.  [facility] has online exercise videos.    - insulin aspart (NOVOLOG FLEXPEN) 100 UNIT/ML pen; 14 units before big meals and 5 units after small meals  Dispense: 15 mL; Refill: 0    - blood glucose (NO BRAND SPECIFIED) test strip; Use to test blood sugar 2-4 times daily or as directed.  Dispense: 100 each; Refill: 11  - insulin glargine (LANTUS PEN) 100 UNIT/ML pen; Inject 30 Units Subcutaneous At Bedtime  Dispense: 9 mL; Refill: 3    Diabetes: The Benefits of Exercise   Exercise can lower blood sugar, help control weight, and boost your mood. It can also improve blood flow, lower blood pressure, and improve heart health. Even a small amount of regular activity can have a big impact on your health      What Can Exercise Help?     Blood sugar. Regular exercise improves blood sugar control by helping your body use insulin.     Mental and emotional health. Physical activity relieves stress and helps you sleep better.     Heart health. With regular exercise, you can reduce your risk of heart disease and high blood pressure. You can also improve your cholesterol and triglyceride levels.     Weight. Exercise helps you lose fat, gain muscle, and control your weight.     Health of blood vessels and nerves. Activity helps lower blood sugar. This helps prevent damage to blood vessels and nerves that can cause problems with your brain, eyes, feet, and legs.     Finances. If you manage your blood sugar, you may spend less on medical care.  Two Types of Exercise   Two types of exercise help your body use blood sugar.     Aerobic exercise.  This is a rhythmic, repeated, continued movement of large muscle groups for at least 10 minutes at a time. Examples include walking, bicycling, jogging, swimming, water aerobics, and many sports.     Resistance exercise (strength training). This type of exercise uses muscles to move weight or work against resistance. You can do it with free weights, machines, resistance tubing, or your own body weight.  When to Stop Exercising and Call Your Doctor   Stop exercising and call your doctor right away if you notice any of the following:     Pain, pressure, tightness, or heaviness in the chest     Pain or heaviness in the neck, shoulders, back, arms, legs, or feet     Unusual shortness of breath     Dizziness or lightheadedness     Unusually rapid or slow pulse     Increased joint or muscle pain        Using Injected Insulin   You can t take insulin by mouth because the acids in your stomach would destroy it. Most people take insulin by injection, or shots. Your health care team will show you how to give yourself insulin injections. Use the steps below as a reminder.     Types of Insulin     There are many types of insulin. Here are types you may use:     Fast-acting insulin. Fast-acting insulin must be taken with meals. Take it within 15 minutes before meals.     Intermediate-acting insulin. Intermediate-acting insulin takes longer to start working than fast-acting insulin. But it stays in your bloodstream longer.     Long-acting insulin. Long-acting insulin is in your bloodstream at all times. It helps keep your blood sugar levels within target range for long periods.  Where to Place Your Injections       Insulin is most often injected into the fat over the abdomen, where it is best absorbed.     Change the injection site each time you give yourself insulin. This helps prevent skin problems.     Have an organized way of moving from site to site. Use all the sites in the same area before moving on to the next. Or use the  same area, but not the same site, for injections given at the same time of day.     Keep about 1 inch between sites.     Leave at least 2 inches around your belly button.   When to Inject Your Insulin     Try to inject your long-acting insulin at the same time every day.     Eat 5 to 15 minutes after injecting your meal insulin, or a mixed insulin, which includes the meal insulin. (This will depend on the type of insulin you take.)  Getting Ready     Wash your hands. Use soap and warm water.     Gather your supplies. You need a clean needle, your insulin, alcohol wipes, and a sharps container.   Injecting the Insulin     Gently pinch up about 1 inch of skin. Do not squeeze the skin.     Insert the needle into the location that you were taught to inject.     Push in the pen. Press until the full dose is given. Let go of the skin. Then withdraw the needle. Don t rub the site after you remove the needle.  Disposing of the Needle     After you inject, put the needle and directly in a sharps container. And don t recap the needle.     When the sharps container is full, put it into a garbage bag and secure the top. Label the bag  needles  or  sharps.    Storing Your Insulin     Insulin must be kept cool for it to work properly. Store unopened pens in the refrigerator. An open pen can be stored at room temperature (such as on the kitchen counter). But don t let the insulin get too hot. Always keep it below 86 F (30 C). And never let it freeze!     Always use insulin before the expiration date on the bottle. Throw pens away after that date, even if you haven t opened them yet.     In addition to having an expiration date, insulin must be used within 28 days of opening the pen. Write the date you opened it on the bottle, as a reminder. Even if you haven t used it up, after 28 days throw it away and open a new bottle.     When you travel, take all of your supplies with you. Put them in an insulated bag, and keep the bag with  you. Never put your supplies in luggage that you check on an airplane or a bus.     Never leave insulin or needles in the car. They can get too hot or too cold or get lost.    Insulin Reaction (Low Blood Sugar)   This occurs when insulin causes your blood sugar to go too low (hypoglycemia). It may happen if you take too much insulin. It can also occur from taking your usual amount of insulin, but not eating enough food due to vomiting or loss of appetite. Other causes include heavy exercise, strong emotions, missing meals, and alcohol use.    Home Care:     Learn the warning signals your body gives as your blood sugar starts to drop. See below.     Always carry a source of fast-acting sugar with you in case you get symptoms of low blood sugar again. At the first sign of low blood sugar, eat or drink 15 to 20 grams of fast-acting sugar to raise your blood sugar. Examples include:     3 to 4 glucose tablets (found at most drugstores)     4 ounces (1/2 cup) of non-diet cola drinks (Coke, Pepsi, root beer, etc.)     4 ounces (1/2 cup) of fruit juice     2 tablespoons of raisins     1 tablespoon of honey    Check your blood sugar 15 minutes after treating yourself. If it is still low, take another 15 to 20 grams of fast-acting sugar. Test again in 15 minutes. If it s still low, go to an emergency room.     Once your blood sugar returns to normal, eat a snack or meal to keep your blood sugar in a safe range.     In the future, if you are not able to eat your normal amount at each meal due to illness or vomiting, you MUST reduce your insulin dose. Contact your doctor to ask for a temporary adjustment of your dose. If you cannot eat, and there is a delay in reaching your doctor, reduce your daily insulin dose to one-half of what you usually take. Check your blood sugar every 4-6 hours. Do this until you are able to begin eating normal amounts again.     Wear a medical alert bracelet or carry a card in your wallet explaining  that you are diabetic. In the event that you have a severe hypoglycemic reaction and are unable to give this information, it will help medical personnel provide proper care.    Get Prompt Medical Attention   if any of the following occur:     HIGH BLOOD SUGAR: frequent urination, dizziness, drowsiness, thirst, headache, nausea or vomiting, abdominal pain, vision changes, fast breathing, confusion or loss of consciousness     LOW BLOOD SUGAR: fatigue, headache, shakes, excess sweating, hunger, feeling anxious or restless, vision changes, drowsiness, weakness, confusion, or loss of consciousness      Using a Blood Sugar Log   To help manage your diabetes, you ll need to check your blood sugar level as directed by your health care provider. Keeping a log of your blood sugar levels will help you track your blood sugar readings. It s a simple and easy way to see how well you are controlling your diabetes.       Checking Your Blood Sugar Level   You can check your blood sugar level with a blood glucose meter. The meter supplies a reading that tells you the level of your blood sugar. Your readings should be in your target range as often as possible. This means not too high or too low. Staying in this range helps reduce your risk of complications. Your doctor will help you figure out your ideal target range.    Tracking Your Readings   Every time you check your blood sugar, use your log to keep track of your readings. You may be advised by your doctor to check your blood sugar in the morning, at bedtime, and before and after meals. Be sure to write down ALL of your numbers. Also, use your log to record things that might have affected your blood sugar. Some examples include being sick, being physically active, feeling stressed, or skipping meals.   Lessons [place] from Your Readings   Tracking your blood sugar readings helps you identify patterns. These patterns tell you how your actions affect your blood sugar. For  instance, you may have higher numbers after eating certain foods or lower numbers after exercise. Keep in mind that there are no  good  or  bad  numbers. They just help you understand how to stay in your target range more often, so that your diabetes remains in good control.   Sharing Your Log with Your Health Care Team   Bring your blood sugar log and glucose meter with you to all of your health care appointments. It can help your doctor and other members of your health care team make adjustments to your treatment plan, if needed. This may involve making changes in what you eat, what medications you take, or how much you exercise.   To Learn More   The resources below can help you learn more:     American Diabetes Association [phone] www.diabetes.org      [facility] [phone] www.hormone.org        Please call or return to clinic if your symptoms don't go away.    Follow up plan  Follow up telephone visit in 2-3 weeks    Thank you for coming to [facility] today.  Lab Testing:  **If you had lab testing today and your results are reassuring or normal they will be mailed to you or sent through [employer] within 7 days.   **If the lab tests need quick action we will call you with the results.  The phone number we will call with results is # [phone] (home) [phone] (work). If this is not the best number please call our clinic and change the number.  Medication Refills:  If you need any refills please call your pharmacy and they will contact us.   If you need to  your refill at a new pharmacy, please contact the new pharmacy directly. The new pharmacy will help you get your medications transferred faster.   Scheduling:  If you have any concerns about today's visit or wish to schedule another appointment please call our office during normal business hours [phone] (8-5:00 M-F)  If a referral was made to a [facility] and you don't get a call from [place]  scheduling please call [phone].  If a Mammogram was ordered for you at [facility] call [phone] to schedule or change your appointment.  If you had an XRay/CT/Ultrasound/MRI ordered the number is [phone] to schedule or change your radiology appointment.   Medical Concerns:  If you have urgent medical concerns please call [phone] at any time of the day.    Anthony Lim, DO

## 2020-03-27 NOTE — PROGRESS NOTES
"Family Medicine Telephone Visit Note           Telephone Visit Consent   Patient was verbally read the following and verbal consent was obtained.  \"I understand that I may revoke this request for a phone visit at any time.  This consent will automatically  3 months from the signed date and time.\"    Name person giving consent:  Patient   Date verbal consent given:  3/27/2020  Time verbal consent given:  1:29 PM      Chief Complaint   Patient presents with     Diabetes     Current Outpatient Medications   Medication Sig Dispense Refill     aspirin (ASA) 81 MG tablet Take 1 tablet (81 mg) by mouth daily 90 tablet 3     atorvastatin (LIPITOR) 80 MG tablet Take 1 tablet (80 mg) by mouth daily 90 tablet 3     blood glucose (NO BRAND SPECIFIED) lancets standard Use to test blood sugar 3 times daily or as directed. 100 each 11     blood glucose (NO BRAND SPECIFIED) test strip Use to test blood sugar 2-4 times daily or as directed. 100 each 11     blood glucose (NO BRAND SPECIFIED) test strip 1 strip by [employer] route daily 1 Box 2     blood glucose monitoring (NO BRAND SPECIFIED) meter device kit Use to test blood sugar 2-4 times daily or as directed. 1 kit 11     blood glucose monitoring (ONE TOUCH ULTRA 2) meter device kit Use to test blood sugar 3 times daily or as directed. 1 kit 0     CETAPHIL MOISTURIZING (CETAPHIL) external lotion Apply topically as needed for dry skin 250 mL 11     insulin aspart (NOVOLOG FLEXPEN) 100 UNIT/ML pen 10 units before big meals and 5 units after small meals 15 mL 0     insulin glargine (LANTUS PEN) 100 UNIT/ML pen Inject 30 Units Subcutaneous At Bedtime 9 mL 3     insulin pen needle (B-D U/F) 31G X 8 MM miscellaneous Use 3 daily or as directed. 100 each prn     lisinopril (ZESTRIL) 5 MG tablet Take 1 tablet (5 mg) by mouth daily 90 tablet 1     loratadine (CLARITIN) 10 MG capsule Take 10 mg by mouth daily 30 capsule 11     metFORMIN (GLUCOPHAGE-XR) 500 MG 24 hr tablet Take 4 " tablets (2,000 mg) by mouth daily 240 tablet 1     metoprolol succinate ER (TOPROL-XL) 50 MG 24 hr tablet Take 1 tablet (50 mg) by mouth daily 30 tablet 3     nitroGLYcerin (NITROSTAT) 0.4 MG sublingual tablet Place 1 tablet (0.4 mg) under the tongue every 5 minutes as needed for chest pain 20 tablet 0     No Known Allergies              HPI   Patients name: Don  Appointment start time:  1:29 PM    Diabetes Follow-up      Patient is checking blood sugars: ran out of testing strips, currently waiting on refill. BGs in the 300s post-prandial. Taking the 10 unit(s) novolog pre-meals, adding 5 unit(s) 4h after. 145-180 fasting BG. Denies any BGs < 70.         -Last A1C was   Lab Results   Component Value Date    A1C 10.0 12/31/2019    A1C 10.3 07/01/2019    A1C 8.0 12/06/2016    A1C 8.8 08/17/2016    A1C 8.8 05/11/2016        Diabetic concerns: blood sugar frequently over 200    Chest Pain or exercise related calf pain (claudication):no     Symptoms of hypoglycemia (low blood sugar): none     Paresthesias (numbness or burning in feet) or sores: No     Diabetic eye exam within the last year?: Yes      Adherence and Exercise  Medication side effects: yes: difficulty with erections  How often is a medication missed? Did not ask  Exercise:walking  2-3 days/week, walking. Used to go to [employer] before closed 2/2 covid. Youtube home workouts.     used via [employer] or similar service.  number:  [id number]          Assessment and Plan   Don was seen today for diabetes.    Diagnoses and all orders for this visit:    Uncontrolled type 2 diabetes mellitus with hyperglycemia (H)  Type 2 diabetes mellitus with other circulatory complication, with long-term current use of insulin (H)  - will increase novolog with meals to 14 unit(s) tid, since post-prandial sugars are highest.  - next visit in 1 week: likely will increase lantus next  - counseling given re: exercise, positive reinforcement provided for what  he is already doing  - would like patient to be on GLP-1 or SGLT-2, but will defer this until we are able to meet in person without COVID-19 concern, and just titrate insulin in the meantime.    -     insulin aspart (NOVOLOG FLEXPEN) 100 UNIT/ML pen; 14 units before big meals and 5 units after small meals  -     blood glucose (NO BRAND SPECIFIED) test strip; Use to test blood sugar 2-4 times daily or as directed.  -     insulin glargine (LANTUS PEN) 100 UNIT/ML pen; Inject 30 Units Subcutaneous At Bedtime    Vasculogenic erectile dysfunction, unspecified vasculogenic erectile dysfunction type  - discussed that I believe his erectile dysfunction is more likely a symptom of vascular disease from his diabetes, rather than a side effect from his meds.    Dry skin  -     Cetaphil Moisturizing (CETAPHIL) external lotion; Apply topically as needed for dry skin      Refilled medications that would be required in the next 3 months.     After Visit Information:  Patient chose to view AVS via [employer]  Text link sent  Kids read English and will help him set it up.    Appointment end time: 1:50 PM  This is a telephone visit that took 21 minutes.      Clinician location:  [facility] Resident PGY-2  [phone]

## 2020-04-27 DIAGNOSIS — E11.65 UNCONTROLLED TYPE 2 DIABETES MELLITUS WITH HYPERGLYCEMIA (H): ICD-10-CM

## 2020-04-27 NOTE — TELEPHONE ENCOUNTER
novolog flexpen    LAST FILLED DATE: 03-  LAST FILLED STRENGTH: n/a MG  LAST FILLED QTY: 9  LAST OFFICE VISIT: 03-    Patricia ALVARENGA CP @    [facility]  [address] [place] [address]  Phone: [phone]  Fax: [phone]

## 2020-04-28 RX ORDER — INSULIN ASPART 100 [IU]/ML
INJECTION, SOLUTION INTRAVENOUS; SUBCUTANEOUS
Qty: 15 ML | Refills: 4 | Status: SHIPPED | OUTPATIENT
Start: 2020-04-28 | End: 2020-11-02

## 2020-06-17 DIAGNOSIS — Z79.4 TYPE 2 DIABETES MELLITUS WITH OTHER CIRCULATORY COMPLICATION, WITH LONG-TERM CURRENT USE OF INSULIN (H): ICD-10-CM

## 2020-06-17 DIAGNOSIS — E11.65 UNCONTROLLED TYPE 2 DIABETES MELLITUS WITH HYPERGLYCEMIA (H): ICD-10-CM

## 2020-06-17 DIAGNOSIS — E11.59 TYPE 2 DIABETES MELLITUS WITH OTHER CIRCULATORY COMPLICATION, WITH LONG-TERM CURRENT USE OF INSULIN (H): ICD-10-CM

## 2020-06-17 NOTE — TELEPHONE ENCOUNTER

## 2020-06-17 NOTE — TELEPHONE ENCOUNTER

## 2020-06-22 DIAGNOSIS — E11.65 UNCONTROLLED TYPE 2 DIABETES MELLITUS WITH HYPERGLYCEMIA (H): ICD-10-CM

## 2020-06-22 NOTE — TELEPHONE ENCOUNTER
Aspirin last filled 5/22/20 for #30    Thank you,  Yamile Baer, PharmD  [facility]  [phone]

## 2020-08-07 DIAGNOSIS — E11.65 UNCONTROLLED TYPE 2 DIABETES MELLITUS WITH HYPERGLYCEMIA (H): ICD-10-CM

## 2020-08-07 RX ORDER — PEN NEEDLE, DIABETIC 31 GX5/16"
NEEDLE, DISPOSABLE MISCELLANEOUS
Qty: 100 EACH | Status: SHIPPED | OUTPATIENT
Start: 2020-08-07 | End: 2022-02-25

## 2020-08-07 NOTE — TELEPHONE ENCOUNTER
BD Pen Needles last filled 6/17/2020 for #100    Thank you,  Yamile Baer, PharmD  [facility]  [phone]

## 2020-08-14 DIAGNOSIS — E78.5 HYPERLIPIDEMIA WITH TARGET LDL LESS THAN 100: ICD-10-CM

## 2020-08-14 DIAGNOSIS — I21.4 NON-ST ELEVATION (NSTEMI) MYOCARDIAL INFARCTION (H): ICD-10-CM

## 2020-08-14 DIAGNOSIS — Z79.4 TYPE 2 DIABETES MELLITUS WITHOUT COMPLICATION, WITH LONG-TERM CURRENT USE OF INSULIN (H): ICD-10-CM

## 2020-08-14 DIAGNOSIS — E11.9 TYPE 2 DIABETES MELLITUS WITHOUT COMPLICATION, WITH LONG-TERM CURRENT USE OF INSULIN (H): ICD-10-CM

## 2020-08-14 RX ORDER — METFORMIN HCL 500 MG
2000 TABLET, EXTENDED RELEASE 24 HR ORAL DAILY
Qty: 120 TABLET | Refills: 0 | Status: SHIPPED | OUTPATIENT
Start: 2020-08-14 | End: 2020-11-02

## 2020-08-14 RX ORDER — ATORVASTATIN CALCIUM 80 MG/1
80 TABLET, FILM COATED ORAL DAILY
Qty: 30 TABLET | Refills: 0 | Status: SHIPPED | OUTPATIENT
Start: 2020-08-14 | End: 2020-09-17

## 2020-08-14 NOTE — TELEPHONE ENCOUNTER
Patient long overdue for follow up for uncontrolled DM. 1 month limited rx sent. Letter sent.    Anthony Lim DO

## 2020-08-14 NOTE — LETTER
[facility]  [address],  [address]  [facility] [address]  Phone: [phone]  Fax: [phone]   August 14, 2020      Don Niño  [address]  [facility] [address]          Dear Don,      A request for a refill on your diabetes medications were sent to us from your pharmacy. I have notified the pharmacy to allow you one more refill. It is time for your recheck at the clinic so we can monitor the medication and continue to prescribe it safely. Please make clinic appointment with me or another provider at [facility] in the next one month    Thank you for choosing us as your healthcare provider.      Sincerely,        Anthony Lim, DO

## 2020-08-14 NOTE — TELEPHONE ENCOUNTER
"Request for medication refill: metFORMIN (GLUCOPHAGE-XR) 500 MG 24 hr tablet     Providers if patient needs an appointment and you are willing to give a one month supply please refill for one month and  send a letter/MyChart using \".SMILLIMITEDREFILL\" .smillimited and route chart to \"[facility] \" (Giving one month refill in non controlled medications is strongly recommended before denial)    If refill has been denied, meaning absolutely no refills without visit, please complete the smart phrase \".smirxrefuse\" and route it to the \"[facility] MED REFILLS\"  pool to inform the patient and the pharmacy.    Lucy Charles, CMA        "

## 2020-10-09 ENCOUNTER — TELEPHONE (OUTPATIENT)
Dept: FAMILY MEDICINE | Facility: CLINIC | Age: 55
End: 2020-10-09

## 2020-10-09 DIAGNOSIS — E11.9 TYPE 2 DIABETES MELLITUS WITHOUT COMPLICATION, WITH LONG-TERM CURRENT USE OF INSULIN (H): ICD-10-CM

## 2020-10-09 DIAGNOSIS — Z79.4 TYPE 2 DIABETES MELLITUS WITHOUT COMPLICATION, WITH LONG-TERM CURRENT USE OF INSULIN (H): ICD-10-CM

## 2020-10-09 NOTE — TELEPHONE ENCOUNTER
Verify that the refill encounter hasn't been started Yes    [facility] phone call message- patient requesting a refill:    Full Medication Name: blood glucose monitoring (NO BRAND SPECIFIED) meter device kit    Dose: Use to test blood sugar 2-4 times daily or as directed.     Pharmacy confirmed as   [facility] [place], [place] - [address] [facility] [address]  Phone: [phone] Fax: [phone]  : Yes    Medication tab checked to see if medication has been sent  Yes    Additional Comments: Patient stated that their current monitor is not working.     OK to leave a message on voice mail? Yes    Advised patient refill may take up to 2 business days? Yes    Primary language: Bur[place]      needed? No    Call taken on October 9, 2020 at 3:31 PM by Stephanie Arreguin to [facility] MED REFILL

## 2020-11-02 DIAGNOSIS — Z79.4 TYPE 2 DIABETES MELLITUS WITH OTHER CIRCULATORY COMPLICATION, WITH LONG-TERM CURRENT USE OF INSULIN (H): ICD-10-CM

## 2020-11-02 DIAGNOSIS — I10 ESSENTIAL HYPERTENSION WITH GOAL BLOOD PRESSURE LESS THAN 140/90: ICD-10-CM

## 2020-11-02 DIAGNOSIS — Z79.4 TYPE 2 DIABETES MELLITUS WITHOUT COMPLICATION, WITH LONG-TERM CURRENT USE OF INSULIN (H): ICD-10-CM

## 2020-11-02 DIAGNOSIS — E11.59 TYPE 2 DIABETES MELLITUS WITH OTHER CIRCULATORY COMPLICATION, WITH LONG-TERM CURRENT USE OF INSULIN (H): ICD-10-CM

## 2020-11-02 DIAGNOSIS — E11.65 UNCONTROLLED TYPE 2 DIABETES MELLITUS WITH HYPERGLYCEMIA (H): ICD-10-CM

## 2020-11-02 DIAGNOSIS — I21.4 NON-ST ELEVATION (NSTEMI) MYOCARDIAL INFARCTION (H): ICD-10-CM

## 2020-11-02 DIAGNOSIS — E11.9 TYPE 2 DIABETES MELLITUS WITHOUT COMPLICATION, WITH LONG-TERM CURRENT USE OF INSULIN (H): ICD-10-CM

## 2020-11-02 RX ORDER — INSULIN ASPART 100 [IU]/ML
INJECTION, SOLUTION INTRAVENOUS; SUBCUTANEOUS
Qty: 15 ML | Refills: 0 | Status: SHIPPED | OUTPATIENT
Start: 2020-11-02 | End: 2020-12-04

## 2020-11-02 RX ORDER — METFORMIN HCL 500 MG
2000 TABLET, EXTENDED RELEASE 24 HR ORAL DAILY
Qty: 120 TABLET | Refills: 0 | Status: SHIPPED | OUTPATIENT
Start: 2020-11-02 | End: 2020-12-04

## 2020-11-02 RX ORDER — LISINOPRIL 5 MG/1
5 TABLET ORAL DAILY
Qty: 30 TABLET | Refills: 0 | Status: SHIPPED | OUTPATIENT
Start: 2020-11-02 | End: 2020-12-04

## 2020-11-02 NOTE — TELEPHONE ENCOUNTER
lantus solostar last filled 9/30/2020 for #15    Metformin ER 500mg last filled 8/17/2020 for #120    Novolog last filled 9/15/2020 for #15    Lisinopril 5mg last filled 9/18/2020 for #60    Thank you,    Yamile Baer, PharmD  [facility]  [phone]

## 2020-11-02 NOTE — LETTER
[facility]  [address]  [address]  [facility] [address]  Phone: [phone]  Fax: [phone]     November 2, 2020      Don Niño  [address]  [facility] [address]          Dear Don,      A request for a refill on your diabetic medications was sent to us from your pharmacy. I have notified the pharmacy to allow you one more refill. Your diabetes has been uncontrolled for at least a year, and you have not talked to anyone at clinic about your diabetes since Ramadan! You need an appointment at [facility] with me or another doctor before you have any more refills!    It is time for your recheck at the clinic so we can monitor the medication and continue to prescribe it safely. Please make clinic appointment with me or another provider at [facility] in the next one month    Thank you for choosing us as your healthcare provider.      Sincerely,    Anthony Lim, DO

## 2020-12-04 DIAGNOSIS — I21.4 NON-ST ELEVATION (NSTEMI) MYOCARDIAL INFARCTION (H): ICD-10-CM

## 2020-12-04 DIAGNOSIS — E11.9 TYPE 2 DIABETES MELLITUS WITHOUT COMPLICATION, WITH LONG-TERM CURRENT USE OF INSULIN (H): ICD-10-CM

## 2020-12-04 DIAGNOSIS — Z79.4 TYPE 2 DIABETES MELLITUS WITH OTHER CIRCULATORY COMPLICATION, WITH LONG-TERM CURRENT USE OF INSULIN (H): ICD-10-CM

## 2020-12-04 DIAGNOSIS — Z79.4 TYPE 2 DIABETES MELLITUS WITHOUT COMPLICATION, WITH LONG-TERM CURRENT USE OF INSULIN (H): ICD-10-CM

## 2020-12-04 DIAGNOSIS — I10 BENIGN ESSENTIAL HYPERTENSION: ICD-10-CM

## 2020-12-04 DIAGNOSIS — E11.59 TYPE 2 DIABETES MELLITUS WITH OTHER CIRCULATORY COMPLICATION, WITH LONG-TERM CURRENT USE OF INSULIN (H): ICD-10-CM

## 2020-12-04 DIAGNOSIS — E11.65 UNCONTROLLED TYPE 2 DIABETES MELLITUS WITH HYPERGLYCEMIA (H): ICD-10-CM

## 2020-12-04 DIAGNOSIS — E78.5 HYPERLIPIDEMIA WITH TARGET LDL LESS THAN 100: ICD-10-CM

## 2020-12-04 DIAGNOSIS — I10 ESSENTIAL HYPERTENSION WITH GOAL BLOOD PRESSURE LESS THAN 140/90: ICD-10-CM

## 2020-12-04 RX ORDER — ATORVASTATIN CALCIUM 80 MG/1
80 TABLET, FILM COATED ORAL DAILY
Qty: 60 TABLET | Refills: 0 | Status: SHIPPED | OUTPATIENT
Start: 2020-12-04 | End: 2021-01-19

## 2020-12-04 RX ORDER — METOPROLOL SUCCINATE 50 MG/1
50 TABLET, EXTENDED RELEASE ORAL DAILY
Qty: 60 TABLET | Refills: 0 | Status: SHIPPED | OUTPATIENT
Start: 2020-12-04 | End: 2021-01-19

## 2020-12-04 RX ORDER — INSULIN ASPART 100 [IU]/ML
INJECTION, SOLUTION INTRAVENOUS; SUBCUTANEOUS
Qty: 15 ML | Refills: 0 | Status: SHIPPED | OUTPATIENT
Start: 2020-12-04 | End: 2021-02-23

## 2020-12-04 RX ORDER — LISINOPRIL 5 MG/1
5 TABLET ORAL DAILY
Qty: 30 TABLET | Refills: 0 | Status: SHIPPED | OUTPATIENT
Start: 2020-12-04 | End: 2021-01-19

## 2020-12-04 RX ORDER — METFORMIN HCL 500 MG
2000 TABLET, EXTENDED RELEASE 24 HR ORAL DAILY
Qty: 120 TABLET | Refills: 0 | Status: SHIPPED | OUTPATIENT
Start: 2020-12-04 | End: 2021-01-19

## 2020-12-04 NOTE — TELEPHONE ENCOUNTER
Atorvastatin 80 last filled 10/30/2020 for #30    Metformin ER 500mg last filled 11/3/2020 for #120    Metoprolol Succ ER 50mg last filled 10/31/2020 for #30    Lisinopril 5mg last filled 11/3/2020 for #30    Novolog last filled 11/3/2020 for #15mL    Lantus Solostar last filled 11/4/2020 for #15mL    Thank you,    Yamile Baer, PharmD  [facility]  [phone]

## 2020-12-04 NOTE — LETTER
[facility]  [address]  [address]  [facility] [address]  Phone: [phone]  Fax: [phone]    December 4, 2020        Don Niño  [address]  [facility] [address]    Dear Don,      A request for a refill on your diabetic and heart medications was sent to us from your pharmacy. I have notified the pharmacy to allow you one more refill. Your diabetes has been uncontrolled for at least a year, and you have not talked to anyone at clinic about your diabetes since Ramadan! I have been sending you letters telling you to come to clinic since July!! We have also tried calling you many times without answer! You need an appointment at [facility] with me or another doctor to control your diabetes and keep your heart healthy!!!    It is time for your recheck at the clinic so we can monitor the medication and continue to prescribe it safely. Please make clinic appointment with me or another provider at [facility] in the next one month    Thank you for choosing us as your healthcare provider.      Sincerely,    Anthony Lim, DO

## 2020-12-05 NOTE — TELEPHONE ENCOUNTER
Fourth limited refill letter sent today since July. At least 2 calls have been made without response as well.  Will continue to provide another refill, as it would be worse for a patient with CAD hx and uncontrolled diabetes to be without medication.    [facility] Resident PGY-3  [phone]

## 2021-01-03 ENCOUNTER — APPOINTMENT (OUTPATIENT)
Dept: GENERAL RADIOLOGY | Facility: CLINIC | Age: 56
End: 2021-01-03
Attending: EMERGENCY MEDICINE
Payer: COMMERCIAL

## 2021-01-03 ENCOUNTER — HOSPITAL ENCOUNTER (EMERGENCY)
Facility: CLINIC | Age: 56
Discharge: HOME | End: 2021-01-03
Attending: EMERGENCY MEDICINE | Admitting: EMERGENCY MEDICINE
Payer: COMMERCIAL

## 2021-01-03 VITALS
OXYGEN SATURATION: 100 % | HEIGHT: 71 IN | WEIGHT: 210.4 LBS | RESPIRATION RATE: 18 BRPM | SYSTOLIC BLOOD PRESSURE: 135 MMHG | BODY MASS INDEX: 29.46 KG/M2 | HEART RATE: 113 BPM | DIASTOLIC BLOOD PRESSURE: 68 MMHG

## 2021-01-03 DIAGNOSIS — S46.911A RIGHT SHOULDER STRAIN, INITIAL ENCOUNTER: ICD-10-CM
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
## 2021-01-03 DIAGNOSIS — S60.221A CONTUSION OF RIGHT HAND, INITIAL ENCOUNTER: ICD-10-CM

## 2021-01-03 DIAGNOSIS — K08.89 PAIN, DENTAL: ICD-10-CM

## 2021-01-03 LAB — GLUCOSE BLDC GLUCOMTR-MCNC: 164 MG/DL (ref 70–99)

## 2021-01-03 PROCEDURE — 73130 X-RAY EXAM OF HAND: CPT | Mod: RT

## 2021-01-03 PROCEDURE — 99284 EMERGENCY DEPT VISIT MOD MDM: CPT | Performed by: EMERGENCY MEDICINE

## 2021-01-03 PROCEDURE — 999N001017 HC STATISTIC GLUCOSE BY METER IP

## 2021-01-03 PROCEDURE — 73110 X-RAY EXAM OF WRIST: CPT | Mod: RT

## 2021-01-03 PROCEDURE — 99285 EMERGENCY DEPT VISIT HI MDM: CPT

## 2021-01-03 PROCEDURE — 73030 X-RAY EXAM OF SHOULDER: CPT | Mod: RT

## 2021-01-03 RX ORDER — IBUPROFEN 600 MG/1
600 TABLET, FILM COATED ORAL EVERY 6 HOURS PRN
Qty: 30 TABLET | Refills: 0 | Status: SHIPPED | OUTPATIENT
Start: 2021-01-03 | End: 2022-11-30

## 2021-01-03 RX ORDER — PENICILLIN V POTASSIUM 500 MG/1
500 TABLET, FILM COATED ORAL 4 TIMES DAILY
Qty: 28 TABLET | Refills: 0 | Status: SHIPPED | OUTPATIENT
Start: 2021-01-03 | End: 2021-01-10

## 2021-01-03 ASSESSMENT — MIFFLIN-ST. JEOR: SCORE: 1811.5

## 2021-01-03 NOTE — ED AVS SNAPSHOT
[facility]  [address]  [place] [address]  Phone: [phone]  Fax: [phone]                                    Don Niño   MRN: [id number]    Department: [facility]   Date of Visit: 1/3/2021           After Visit Summary Signature Page    I have received my discharge instructions, and my questions have been answered. I have discussed any challenges I see with this plan with the nurse or doctor.    ..........................................................................................................................................  Patient/Patient Representative Signature      ..........................................................................................................................................  Patient Representative Print Name and Relationship to Patient    ..................................................               ................................................  Date                                   Time    ..........................................................................................................................................  Reviewed by Signature/Title    ...................................................              ..............................................  Date                                               Time          22EPIC Rev 08/18

## 2021-01-04 NOTE — DISCHARGE INSTRUCTIONS
TODAY'S VISIT:  You were seen today for fall, hand pain   -   - If you had any labs or imaging/radiology tests performed today, you should also discuss these tests with your usual provider.     FOLLOW-UP:  Please make an appointment to follow up with:  - Your Primary Care Provider. If you do not have a PCP, please call the Primary Care Center (phone: [phone] for an appointment  -Please make an appointment to follow up with [facility] (phone: [phone]) regarding your dental pain       - Have your provider review the results from today's visit with you again to make sure no further follow-up or additional testing is needed based on those results.     PRESCRIPTIONS / MEDICATIONS:  - Use Tylenol or ibuprofen as needed for pain control at home    OTHER INSTRUCTIONS:  -Apply ice to the injured area several times a day over the next 1 to 2 days and elevate the affected extremity whenever possible to help minimize swelling.    RETURN TO THE EMERGENCY DEPARTMENT  Return to the Emergency Department at any time for any new or worsening symptoms or any concerns.

## 2021-01-04 NOTE — ED PROVIDER NOTES
History     Chief Complaint   Patient presents with     Hand Injury     patient fell last night on ice while going down steps; landed on outstretched arms and injured right hand, arm and shoulder; significant swelling noted in right hand; denies hitting head.      HPI  Don Niño is a 56 year old male with a past medical history of type II diabetes, coronary artery disease (history of MI and cardiac stent), hyperlipidemia, hypertension who presents to the emergency department with a chief complaint of hand pain.  The patient reports that it has been present since he fell last night on the ice while going down the steps.  He reports he landed on his outstretched arms, with most of his weight going onto his right hand.  He now has pain in his right hand, arm, and shoulder.  He has some associated swelling in his right hand.  He denies any head trauma.  No neck pain.  The patient denies any other associated injuries.  The patient takes 81 mg of aspirin daily, no other blood thinners.  Patient did take Tylenol for pain control at home last night which helped somewhat.    The patient also complains of left lower molar dental pain, he states that he used to follow with a dentist, but they stopped accepting his insurance, so he no longer has a dentist.  No fevers or chills.    I have reviewed the Medications, Allergies, Past Medical and Surgical History, and Social History in the [employer] system.    Past Medical History:   Diagnosis Date     CAD (coronary artery disease)     MI     Diabetes mellitus (H)      Hyperlipidaemia      Hypertension      History reviewed. No pertinent surgical history.  No current facility-administered medications for this encounter.      Current Outpatient Medications   Medication     aspirin (ASA) 81 MG EC tablet     aspirin (ASA) 81 MG tablet     atorvastatin (LIPITOR) 80 MG tablet     blood glucose (NO BRAND SPECIFIED) lancets standard     blood glucose (NO BRAND SPECIFIED) test strip      blood glucose (NO BRAND SPECIFIED) test strip     blood glucose monitoring (NO BRAND SPECIFIED) meter device kit     blood glucose monitoring (ONE TOUCH ULTRA 2) meter device kit     Cetaphil Moisturizing (CETAPHIL) external lotion     insulin aspart (NOVOLOG FLEXPEN) 100 UNIT/ML pen     insulin glargine (LANTUS PEN) 100 UNIT/ML pen     insulin pen needle (B-D U/F) 31G X 8 MM miscellaneous     lisinopril (ZESTRIL) 5 MG tablet     loratadine (CLARITIN) 10 MG capsule     metFORMIN (GLUCOPHAGE-XR) 500 MG 24 hr tablet     metoprolol succinate ER (TOPROL-XL) 50 MG 24 hr tablet     nitroGLYcerin (NITROSTAT) 0.4 MG sublingual tablet     Allergies   Allergen Reactions     Seasonal Allergies      Past medical history, past surgical history, medications, and allergies were reviewed with the patient. Additional pertinent items: None    Social History     Socioeconomic History     Marital status:      Spouse name: Not on file     Number of children: Not on file     Years of education: Not on file     Highest education level: Not on file   Occupational History     Not on file   Social Needs     Financial resource strain: Not on file     Food insecurity     Worry: Not on file     Inability: Not on file     Transportation needs     Medical: Not on file     Non-medical: Not on file   Tobacco Use     Smoking status: Current Some Day Smoker     Packs/day: 0.00     Types: Cigarettes     Last attempt to quit: 2019     Years since quittin.0     Smokeless tobacco: Never Used   Substance and Sexual Activity     Alcohol use: No     Drug use: No     Sexual activity: Yes     Partners: Male   Lifestyle     Physical activity     Days per week: Not on file     Minutes per session: Not on file     Stress: Not on file   Relationships     Social connections     Talks on phone: Not on file     Gets together: Not on file     Attends Nondenominational service: Not on file     Active member of club or organization: Not on file     Attends  "meetings of clubs or organizations: Not on file     Relationship status: Not on file     Intimate partner violence     Fear of current or ex partner: Not on file     Emotionally abused: Not on file     Physically abused: Not on file     Forced sexual activity: Not on file   Other Topics Concern     Parent/sibling w/ CABG, MI or angioplasty before 65F 55M? Not Asked   Social History Narrative     Not on file     Social history was reviewed with the patient. Additional pertinent items: None    Review of Systems  General: No fevers or chills  Skin: No rash or diaphoresis  Eyes: No eye redness or discharge  Ears/Nose/Throat: No rhinorrhea or nasal congestion  Respiratory: No cough or SOB  Cardiovascular: No chest pain or palpitations  Gastrointestinal: No nausea, vomiting, or diarrhea  Genitourinary: No urinary frequency, hematuria, or dysuria  Musculoskeletal: No arthralgias or myalgias  Neurologic: No numbness or weakness  Psychiatric: No depression or SI  Hematologic/Lymphatic/Immunologic: No leg swelling, no easy bruising/bleeding  Endocrine: No polyuria/polydypsia    A complete review of systems was performed with pertinent positives and negatives noted in the HPI, and all other systems negative.    Physical Exam   BP: 135/68  Pulse: 113  Resp: 18  Height: 180.3 cm (5' 11\")  Weight: 95.4 kg (210 lb 6.4 oz)  SpO2: 100 %      General: Well nourished, well developed, NAD  HEENT: EOMI, anicteric. NCAT, MMM  Neck: no jugular venous distension, supple, nl ROM  Cardiac: Regular rate and rhythm.  Normal capillary refill.  Intact peripheral pulses  Pulm: Airway patent, nonlabored breathing  Skin: Warm and dry to the touch.  No rash  Extremities: No LE edema, no cyanosis, w/w/p, tenderness to palpation and swelling over right lateral hand over third through fifth metacarpals, no snuffbox tenderness, normal range of motion, distally neurovascularly intact,  strength intact, tendon function is intact, mild tenderness " palpation over right shoulder, normal range of motion  Neuro: A&Ox3, no gross focal deficits    ED Course        Procedures                           Labs Ordered and Resulted from Time of ED Arrival Up to the Time of Departure from the ED   GLUCOSE BY METER - Abnormal; Notable for the following components:       Result Value    Glucose 164 (*)     All other components within normal limits   GLUCOSE MONITOR NURSING POCT            Results for orders placed or performed during the hospital encounter of 01/03/21 (from the past 24 hour(s))   Glucose by meter   Result Value Ref Range    Glucose 164 (H) 70 - 99 mg/dL   XR Wrist Right G/E 3 Views    Narrative    EXAM: XR WRIST RT G/E 3 VW, XR HAND RT G/E 3 VW  LOCATION: [facility]  DATE/TIME: 1/3/2021 9:02 PM    INDICATION: Pain in right wrist and hand after fall.  COMPARISON: None.      Impression    IMPRESSION: Normal joint spaces and alignment. No fracture.   XR Shoulder Right G/E 3 Views    Narrative    EXAM: XR SHOULDER RT G/E 3 VW  LOCATION: [facility]  DATE/TIME: 1/3/2021 9:02 PM    INDICATION: Right shoulder pain after fall.  COMPARISON: None.      Impression    IMPRESSION: Advanced acromioclavicular degenerative changes. Mild glenohumeral degenerative changes. No evidence of acute fracture.    XR Hand Right G/E 3 Views    Narrative    EXAM: XR WRIST RT G/E 3 VW, XR HAND RT G/E 3 VW  LOCATION: [facility]  DATE/TIME: 1/3/2021 9:02 PM    INDICATION: Pain in right wrist and hand after fall.  COMPARISON: None.      Impression    IMPRESSION: Normal joint spaces and alignment. No fracture.       Labs, vital signs, and imaging studies were reviewed by me.    Medications - No data to display    Assessments & Plan (with Medical Decision Making)   Don Niño is a 56 year old male who presents with hand and arm pain after fall last night.  X-rays ordered to rule out acute fracture or dislocation.  Differential diagnosis also  includes contusion, sprain/strain.    X-ray show degenerative changes, no acute fracture or dislocation noted.    Tomy work-up is remarkable for glucose of 164.    I have reviewed the nursing notes.    I have reviewed the findings, diagnosis, plan and need for follow up with the patient.    Patient to be discharged home. Advised to follow up with PCP within 1 week. To return to ER immediately with any new/worsening symptoms. Plan of care discussed with patient who expresses understanding and agrees with plan of care.    Patient advised to use Tylenol or ibuprofen as needed for pain control at home.  Advised to apply ice several times a day over the next 1 to 2 days and elevate the affected extremity to help reduce swelling.  Patient provided with Ace wrap and ice pack in the emergency department.    Patient provided with prescription for ibuprofen for pain control at home (patient states he has Tylenol at home already) as well as a prescription for penicillin for his dental problem.  Patient was provided with referral to dental clinic for follow-up as well.    Discharge Medication List as of 1/3/2021 10:23 PM      START taking these medications    Details   ibuprofen (ADVIL/MOTRIN) 600 MG tablet Take 1 tablet (600 mg) by mouth every 6 hours as needed, Disp-30 tablet, R-0, Local Print      penicillin V (VEETID) 500 MG tablet Take 1 tablet (500 mg) by mouth 4 times daily for 7 days, Disp-28 tablet, R-0, Local Print             Final diagnoses:   Fall due to slipping on ice or snow, initial encounter   Contusion of right hand, initial encounter   Right shoulder strain, initial encounter   Pain, dental       1/3/2021   [facility] EMERGENCY DEPARTMENT     Halina Ambrocio MD  01/03/21 [id number]

## 2021-02-23 DIAGNOSIS — J30.1 SEASONAL ALLERGIC RHINITIS DUE TO POLLEN: ICD-10-CM

## 2021-02-23 DIAGNOSIS — E11.65 UNCONTROLLED TYPE 2 DIABETES MELLITUS WITH HYPERGLYCEMIA (H): ICD-10-CM

## 2021-02-23 RX ORDER — INSULIN ASPART 100 [IU]/ML
INJECTION, SOLUTION INTRAVENOUS; SUBCUTANEOUS
Qty: 15 ML | Refills: 0 | Status: SHIPPED | OUTPATIENT
Start: 2021-02-23 | End: 2021-02-25

## 2021-02-23 RX ORDER — LORATADINE 10 MG/1
10 CAPSULE, LIQUID FILLED ORAL DAILY
Qty: 30 CAPSULE | Refills: 11 | Status: SHIPPED | OUTPATIENT
Start: 2021-02-23 | End: 2021-02-25

## 2021-02-23 NOTE — TELEPHONE ENCOUNTER
Loratadine 10mg last filled 12/4/2020 for #30    Novolog Flexpen last filled 1/19/2021 for #15mL    Thank you,    Yamile Baer, PharmD  [facility] [address]  [phone]

## 2021-02-25 ENCOUNTER — OFFICE VISIT (OUTPATIENT)
Dept: FAMILY MEDICINE | Facility: CLINIC | Age: 56
End: 2021-02-25
Payer: COMMERCIAL

## 2021-02-25 VITALS
BODY MASS INDEX: 29.4 KG/M2 | DIASTOLIC BLOOD PRESSURE: 81 MMHG | TEMPERATURE: 97.8 F | WEIGHT: 210 LBS | RESPIRATION RATE: 18 BRPM | HEART RATE: 93 BPM | SYSTOLIC BLOOD PRESSURE: 134 MMHG | HEIGHT: 71 IN | OXYGEN SATURATION: 97 %

## 2021-02-25 DIAGNOSIS — I10 ESSENTIAL HYPERTENSION WITH GOAL BLOOD PRESSURE LESS THAN 140/90: ICD-10-CM

## 2021-02-25 DIAGNOSIS — J30.1 SEASONAL ALLERGIC RHINITIS DUE TO POLLEN: ICD-10-CM

## 2021-02-25 DIAGNOSIS — E66.3 OVERWEIGHT WITH BODY MASS INDEX (BMI) OF 25 TO 25.9 IN ADULT: ICD-10-CM

## 2021-02-25 DIAGNOSIS — R06.09 DYSPNEA ON EXERTION: ICD-10-CM

## 2021-02-25 DIAGNOSIS — I25.10 CORONARY ARTERY DISEASE INVOLVING NATIVE HEART, ANGINA PRESENCE UNSPECIFIED, UNSPECIFIED VESSEL OR LESION TYPE: ICD-10-CM

## 2021-02-25 DIAGNOSIS — Z00.00 HEALTHCARE MAINTENANCE: ICD-10-CM

## 2021-02-25 DIAGNOSIS — E78.5 HYPERLIPIDEMIA LDL GOAL <70: ICD-10-CM

## 2021-02-25 DIAGNOSIS — E11.59 TYPE 2 DIABETES MELLITUS WITH OTHER CIRCULATORY COMPLICATION, WITH LONG-TERM CURRENT USE OF INSULIN (H): Primary | ICD-10-CM

## 2021-02-25 DIAGNOSIS — Z79.4 TYPE 2 DIABETES MELLITUS WITH OTHER CIRCULATORY COMPLICATION, WITH LONG-TERM CURRENT USE OF INSULIN (H): Primary | ICD-10-CM

## 2021-02-25 LAB
ANION GAP SERPL CALCULATED.3IONS-SCNC: 5 MMOL/L (ref 3–14)
BUN SERPL-MCNC: 10 MG/DL (ref 7–30)
CALCIUM SERPL-MCNC: 9.3 MG/DL (ref 8.5–10.1)
CHLORIDE SERPL-SCNC: 108 MMOL/L (ref 94–109)
CHOLEST SERPL-MCNC: 126.9 MG/DL (ref 0–200)
CHOLEST/HDLC SERPL: 3.7 {RATIO} (ref 0–5)
CO2 SERPL-SCNC: 25 MMOL/L (ref 20–32)
CREAT SERPL-MCNC: 0.73 MG/DL (ref 0.66–1.25)
CREAT UR-MCNC: 95 MG/DL
GFR SERPL CREATININE-BSD FRML MDRD: >90 ML/MIN/{1.73_M2}
GLUCOSE SERPL-MCNC: 165 MG/DL (ref 70–99)
HBA1C MFR BLD: 8.2 % (ref 4.1–5.7)
HCV AB SERPL QL IA: NONREACTIVE
HDLC SERPL-MCNC: 34.4 MG/DL
LDLC SERPL CALC-MCNC: 74 MG/DL (ref 0–129)
MICROALBUMIN UR-MCNC: 209 MG/L
MICROALBUMIN/CREAT UR: 220.23 MG/G CR (ref 0–17)
POTASSIUM SERPL-SCNC: 4.1 MMOL/L (ref 3.4–5.3)
SODIUM SERPL-SCNC: 138 MMOL/L (ref 133–144)
TRIGL SERPL-MCNC: 90.3 MG/DL (ref 0–150)
VLDL CHOLESTEROL: 18.1 MG/DL (ref 7–32)

## 2021-02-25 PROCEDURE — 36415 COLL VENOUS BLD VENIPUNCTURE: CPT | Performed by: FAMILY MEDICINE

## 2021-02-25 PROCEDURE — 99214 OFFICE O/P EST MOD 30 MIN: CPT | Mod: GC | Performed by: STUDENT IN AN ORGANIZED HEALTH CARE EDUCATION/TRAINING PROGRAM

## 2021-02-25 PROCEDURE — 80048 BASIC METABOLIC PNL TOTAL CA: CPT | Performed by: FAMILY MEDICINE

## 2021-02-25 PROCEDURE — 80061 LIPID PANEL: CPT | Performed by: STUDENT IN AN ORGANIZED HEALTH CARE EDUCATION/TRAINING PROGRAM

## 2021-02-25 PROCEDURE — 83036 HEMOGLOBIN GLYCOSYLATED A1C: CPT | Performed by: STUDENT IN AN ORGANIZED HEALTH CARE EDUCATION/TRAINING PROGRAM

## 2021-02-25 PROCEDURE — 82043 UR ALBUMIN QUANTITATIVE: CPT | Performed by: FAMILY MEDICINE

## 2021-02-25 PROCEDURE — 86803 HEPATITIS C AB TEST: CPT | Performed by: FAMILY MEDICINE

## 2021-02-25 RX ORDER — LORATADINE 10 MG/1
10 CAPSULE, LIQUID FILLED ORAL DAILY
Qty: 30 CAPSULE | Refills: 11 | Status: SHIPPED | OUTPATIENT
Start: 2021-02-25 | End: 2022-05-06

## 2021-02-25 RX ORDER — ATORVASTATIN CALCIUM 80 MG/1
80 TABLET, FILM COATED ORAL DAILY
Qty: 30 TABLET | Refills: 11 | Status: SHIPPED | OUTPATIENT
Start: 2021-02-25 | End: 2021-12-09

## 2021-02-25 RX ORDER — INSULIN ASPART 100 [IU]/ML
INJECTION, SOLUTION INTRAVENOUS; SUBCUTANEOUS
Qty: 15 ML | Refills: 11 | Status: SHIPPED | OUTPATIENT
Start: 2021-02-25 | End: 2022-02-04

## 2021-02-25 RX ORDER — LISINOPRIL 5 MG/1
5 TABLET ORAL DAILY
Qty: 30 TABLET | Refills: 11 | Status: SHIPPED | OUTPATIENT
Start: 2021-02-25 | End: 2021-12-09

## 2021-02-25 RX ORDER — DAPAGLIFLOZIN 10 MG/1
10 TABLET, FILM COATED ORAL DAILY
Qty: 30 TABLET | Refills: 11 | Status: SHIPPED | OUTPATIENT
Start: 2021-02-25 | End: 2022-02-04

## 2021-02-25 RX ORDER — METFORMIN HCL 500 MG
2000 TABLET, EXTENDED RELEASE 24 HR ORAL DAILY
Qty: 120 TABLET | Refills: 11 | Status: SHIPPED | OUTPATIENT
Start: 2021-02-25 | End: 2022-01-25

## 2021-02-25 RX ORDER — NITROGLYCERIN 0.4 MG/1
0.4 TABLET SUBLINGUAL EVERY 5 MIN PRN
Qty: 20 TABLET | Refills: 4 | Status: SHIPPED | OUTPATIENT
Start: 2021-02-25

## 2021-02-25 RX ORDER — METOPROLOL SUCCINATE 50 MG/1
50 TABLET, EXTENDED RELEASE ORAL DAILY
Qty: 30 TABLET | Refills: 11 | Status: SHIPPED | OUTPATIENT
Start: 2021-02-25 | End: 2021-12-09

## 2021-02-25 ASSESSMENT — MIFFLIN-ST. JEOR: SCORE: 1809.68

## 2021-02-25 NOTE — PATIENT INSTRUCTIONS
Here is the plan from today's visit    We will start the new diabetes pill - you will pee out extra sugar  We will do a stress test of your heart to make sure it is safe  I will call with lab results  Check your blood sugar at least each morning and after 1-2 meals per day  We'll follow up in a month    1. Healthcare maintenance    - Hepatitis C antibody    2. Type 2 diabetes mellitus without complication, with long-term current use of insulin (H)    - Hemoglobin A1c ([facility])  - Albumin Random Urine Quantitative with Creat Ratio  - dapagliflozin (FARXIGA) 10 MG TABS tablet; Take 1 tablet (10 mg) by mouth daily  Dispense: 30 tablet; Refill: 11  - metFORMIN (GLUCOPHAGE-XR) 500 MG 24 hr tablet; Take 4 tablets (2,000 mg) by mouth daily  Dispense: 120 tablet; Refill: 11  - lisinopril (ZESTRIL) 5 MG tablet; Take 1 tablet (5 mg) by mouth daily Need doctor appointment before more refills.  Dispense: 30 tablet; Refill: 11    3. Hyperlipidemia LDL goal <70    - Lipid [place] ([facility])    4. Essential hypertension    - Basic Metabolic Panel ([facility])    5. Benign essential hypertension    - metoprolol succinate ER (TOPROL-XL) 50 MG 24 hr tablet; Take 1 tablet (50 mg) by mouth daily Need doctor appointment before more refills.  Dispense: 30 tablet; Refill: 11    6. Non-ST elevation (NSTEMI) myocardial infarction (H)    - lisinopril (ZESTRIL) 5 MG tablet; Take 1 tablet (5 mg) by mouth daily Need doctor appointment before more refills.  Dispense: 30 tablet; Refill: 11  - atorvastatin (LIPITOR) 80 MG tablet; Take 1 tablet (80 mg) by mouth daily Need doctor appointment before more refills.  Dispense: 30 tablet; Refill: 11  - nitroGLYcerin (NITROSTAT) 0.4 MG sublingual tablet; Place 1 tablet (0.4 mg) under the tongue every 5 minutes as needed for chest pain  Dispense: 20 tablet; Refill: 4    7. Essential hypertension with goal blood pressure less than 140/90    - lisinopril (ZESTRIL) 5 MG tablet; Take 1 tablet (5 mg) by mouth  daily Need doctor appointment before more refills.  Dispense: 30 tablet; Refill: 11    8. Seasonal allergic rhinitis due to pollen    - loratadine (CLARITIN) 10 MG capsule; Take 10 mg by mouth daily  Dispense: 30 capsule; Refill: 11    9. Type 2 diabetes mellitus with other circulatory complication, with long-term current use of insulin (H)    - insulin glargine (LANTUS PEN) 100 UNIT/ML pen; Inject 30 Units Subcutaneous At Bedtime Need doctor's appointment before any refills!  Dispense: 9 mL; Refill: 11  - blood glucose (NO BRAND SPECIFIED) test strip; Use to test blood sugar 2-4 times daily or as directed.  Dispense: 100 strip; Refill: 11    10. Uncontrolled type 2 diabetes mellitus with hyperglycemia (H)    - insulin aspart (NOVOLOG FLEXPEN) 100 UNIT/ML pen; 14 units before big meals and 5 units after small meals. Need doctor's appointment before any refills!  Dispense: 15 mL; Refill: 11  - aspirin (ASA) 81 MG EC tablet; Take 1 tablet (81 mg) by mouth daily  Dispense: 30 tablet; Refill: 11  - blood glucose (NO BRAND SPECIFIED) lancets standard; Use to test blood sugar 3 times daily or as directed.  Dispense: 100 each; Refill: 11    11. Hyperlipidemia with target LDL less than 100    - atorvastatin (LIPITOR) 80 MG tablet; Take 1 tablet (80 mg) by mouth daily Need doctor appointment before more refills.  Dispense: 30 tablet; Refill: 11    Oral Medications for Type 2 Diabetes   There are many kinds of diabetes medications. Some medications can be swallowed. Others have to be injected. Otherwise, they would be broken down in the stomach before reaching the bloodstream.   Diabetes pills can help to manage your blood sugar. These pills are not insulin. They work to manage your blood sugar in several ways. You may be given a combination of medications. Always follow your health care provider's instructions.   Some pills may put you at higher risk for low blood sugar (hypoglycemia). Watch for symptoms of low blood sugar.  Symptoms are listed below. Call your doctor if low blood sugar occurs often.     SGLT-2 Inhibitors (eg, canagliflozin - Invokana, dapagliflozin - Farxiga)  These pills help lower blood sugar levels in people with type 2 diabetes by increasing the amount of sugar that leaks into the urine. Possible side effects include:     Urinary tract infections     Genital infections, especially in women      Watch for Symptoms of Hypoglycemia     Headaches     Shakiness or dizziness     Hunger     Cold, clammy skin; sweating     A hard, fast heartbeat     Confusion or irritability          Please call or return to clinic if your symptoms don't go away.    Follow up plan  Return in about 4 weeks (around 3/25/2021) for with me, in person diabetes and heart.     Thank you for coming to [facility] today.  Lab Testing:  **If you had lab testing today and your results are reassuring or normal they will be mailed to you or sent through [facility] [employer] within 7 days.   **If the lab tests need quick action we will call you with the results.  The phone number we will call with results is # [phone] (home) [phone] (work). If this is not the best number please call our clinic and change the number.  Medication Refills:  If you need any refills please call your pharmacy and they will contact us.   If you need to  your refill at a new pharmacy, please contact the new pharmacy directly. The new pharmacy will help you get your medications transferred faster.   Scheduling:  If you have any concerns about today's visit or wish to schedule another appointment please call our office during normal business hours [phone] (8-5:00 M-F)   eferrals to [facility] please call [phone].   Mammogram Scheduling [phone]     XRay/CT/Ultrasound/MRI Scheduling [phone]    Medical Concerns:  If you have urgent medical concerns please call [phone] at any time of the day.    Caroline Lim, DO

## 2021-02-25 NOTE — PROGRESS NOTES
Preceptor Attestation:   Patient seen, evaluated and discussed with the resident. I have verified the content of the note, which accurately reflects my assessment of the patient and the plan of care.   Supervising Physician:  Lan Bray MD

## 2021-02-25 NOTE — LETTER
February 26, 2021      Don Niño  [address]  [place] [address]        Dear Don,    Thank you for getting your care at [facility]. Please see below for your test results.    Resulted Orders   Hemoglobin A1c ([facility])   Result Value Ref Range    Hemoglobin A1C 8.2 (H) 4.1 - 5.7 %   Lipid [place] ([facility])   Result Value Ref Range    Cholesterol 126.9 0.0 - 200.0 mg/dL    Cholesterol/HDL Ratio 3.7 0.0 - 5.0    HDL Cholesterol 34.4 (L) >40.0 mg/dL    Triglycerides 90.3 0.0 - 150.0 mg/dL    VLDL Cholesterol 18.1 7.0 - 32.0 mg/dL    LDL Cholesterol Calculated 74 0 - 129 mg/dL   Albumin Random Urine Quantitative with Creat Ratio   Result Value Ref Range    Creatinine Urine 95 mg/dL    Albumin Urine mg/L 209 mg/L    Albumin Urine mg/g Cr 220.23 (H) 0 - 17 mg/g Cr   Hepatitis C antibody   Result Value Ref Range    Hepatitis C Antibody Nonreactive NR^Nonreactive      Comment:      Assay performance characteristics have not been established for newborns,   infants, and children     Basic metabolic panel   Result Value Ref Range    Sodium 138 133 - 144 mmol/L    Potassium 4.1 3.4 - 5.3 mmol/L    Chloride 108 94 - 109 mmol/L    Carbon Dioxide 25 20 - 32 mmol/L    Anion Gap 5 3 - 14 mmol/L    Glucose 165 (H) 70 - 99 mg/dL    Urea Nitrogen 10 7 - 30 mg/dL    Creatinine 0.73 0.66 - 1.25 mg/dL    GFR Estimate >90 >60 mL/min/[1.73_m2]      Comment:      Non  GFR Calc  Starting 12/18/2018, serum creatinine based estimated GFR (eGFR) will be   calculated using the Chronic Kidney Disease Epidemiology Collaboration   (CKD-EPI) equation.      GFR Estimate If Black >90 >60 mL/min/[1.73_m2]      Comment:       GFR Calc  Starting 12/18/2018, serum creatinine based estimated GFR (eGFR) will be   calculated using the Chronic Kidney Disease Epidemiology Collaboration   (CKD-EPI) equation.      Calcium 9.3 8.5 - 10.1 mg/dL       As we discussed at our last visit, please follow  up with a clinic appointment to review these results further.[place]ankan Translation:Sida aan uga wada hadalnay booqashadayaleila ayala, jose elias eliasu eegto natiijuan m.    Sincerely,    Caroline Lim, DO

## 2021-02-25 NOTE — PROGRESS NOTES
Assessment & Plan     Type 2 diabetes mellitus with other circulatory complication, with long-term current use of insulin (H)  Previously uncontrolled with A1C from 1 year ago at 10, had insulin increased since then. Now improved to 8.2, goal < 7 given his young age and comorbidities. Will add SGLT2 inhibitor given his comorbid CAD and HTN. Counseled about expectations.  Will check and record home BG readings, bring back in 1 month follow up. Would get foot exam at that time, and advise eye appt.    - Hemoglobin A1c ([facility])  - Albumin Random Urine Quantitative with Creat Ratio  - dapagliflozin (FARXIGA) 10 MG TABS tablet  Dispense: 30 tablet; Refill: 11  - metFORMIN (GLUCOPHAGE-XR) 500 MG 24 hr tablet  Dispense: 120 tablet; Refill: 11  - insulin glargine (LANTUS PEN) 100 UNIT/ML pen  Dispense: 9 mL; Refill: 11  - insulin aspart (NOVOLOG FLEXPEN) 100 UNIT/ML pen  Dispense: 15 mL; Refill: 11  - blood glucose (NO BRAND SPECIFIED) test strip  Dispense: 100 strip; Refill: 11  - blood glucose (NO BRAND SPECIFIED) lancets standard  Dispense: 100 each; Refill: 11  - Basic metabolic panel    Dyspnea on exertion  Only with sexual activity, not with moderate walking. High risk for angina given CAD hx, will obtain stress echo. Will route to care coordinator to assist with scheduling.  - Echocardiogram Exercise Stress  - Basic metabolic panel    Coronary artery disease involving native heart, angina presence unspecified, unspecified vessel or lesion type  On appropriate medical therapy. Year long refills provided.  - metoprolol succinate ER (TOPROL-XL) 50 MG 24 hr tablet  Dispense: 30 tablet; Refill: 11  - lisinopril (ZESTRIL) 5 MG tablet  Dispense: 30 tablet; Refill: 11  - atorvastatin (LIPITOR) 80 MG tablet  Dispense: 30 tablet; Refill: 11  - aspirin (ASA) 81 MG EC tablet  Dispense: 30 tablet; Refill: 11  - nitroGLYcerin (NITROSTAT) 0.4 MG sublingual tablet  Dispense: 20 tablet; Refill: 4  - Echocardiogram Exercise  "Stress  - Basic metabolic panel    Hyperlipidemia LDL goal <70  At goal.  - Lipid Calvin ([facility])  - Basic metabolic panel    Essential hypertension with goal blood pressure less than 140/90  At goal. On minimal antihypertensive therapy (metoprolol and minimal lisinopril)  - Albumin Random Urine Quantitative with Creat Ratio  - lisinopril (ZESTRIL) 5 MG tablet  Dispense: 30 tablet; Refill: 11  - Basic metabolic panel    Healthcare maintenance  Future needs: tobacco discussion, CR cancer screening  - Hepatitis C antibody  - Basic metabolic panel    Seasonal allergic rhinitis due to pollen  - loratadine (CLARITIN) 10 MG capsule  Dispense: 30 capsule; Refill: 11  - Basic metabolic panel    Overweight with body mass index (BMI) of 25 to 25.9 in adult  Weight stable from 1 year prior                 Return in about 4 weeks (around 3/25/2021) for with me, in person diabetes and heart.    DO [facility] Resident PGY-3  [phone]      Katerin Ochoa is a 56 year old who presents for the following health issues   HPI     1. Diabetes, heart, needs refills  Checking home B last night  [id number]  Sometimes BG > 200 after eating  No hypoglycemia  Didn't bring his meter, doesn't write them down    At max metformin   Lantus 30   Novolog 14 with meals, 5 with snacks  No issues with taking his meds    Walks 45 min 3 days per week, does so without dyspnea, chest pressure or pain  Does endorse palpitations and chest pressure with sex  No leg swelling or calf/thigh pain with walking      Review of Systems         Objective    /81 (BP Location: Left arm, Patient Position: Sitting, Cuff Size: Adult Regular)   Pulse 93   Temp 97.8  F (36.6  C) (Oral)   Resp 18   Ht 1.803 m (5' 11\")   Wt 95.3 kg (210 lb)   SpO2 97%   BMI 29.29 kg/m    Body mass index is 29.29 kg/m .  Physical Exam   General: Alert and oriented, in no acute distress.   Skin: Warm and dry, no abnormalities noted. "   Eyes: Extra-ocular muscles intact, pupils equal and reactive.   ENT: Speech intact, nasal passages open, no hearing impairment noted.   CV: No cyanosis or pallor, warm and well perfused. Regular rate and rhythm, no murmur.  Respiratory: No respiratory distress, no accessory muscle use. Ctab.  Neuro: Gait and station normal, comprehension intact. Gross and fine motor skills intact.   Psychiatric: Mood and affect appear normal.   Extremities: Warm, able to move all four extremities at will. No pedal edema or calf tenderness.    Lab results not available during visit  Results for orders placed or performed in visit on 02/25/21 (from the past 24 hour(s))   Hemoglobin A1c ([facility])   Result Value Ref Range    Hemoglobin A1C 8.2 (H) 4.1 - 5.7 %   Lipid Cascade ([facility])   Result Value Ref Range    Cholesterol 126.9 0.0 - 200.0 mg/dL    Cholesterol/HDL Ratio 3.7 0.0 - 5.0    HDL Cholesterol 34.4 (L) >40.0 mg/dL    Triglycerides 90.3 0.0 - 150.0 mg/dL    VLDL Cholesterol 18.1 7.0 - 32.0 mg/dL    LDL Cholesterol Calculated 74 0 - 129 mg/dL

## 2021-02-26 ENCOUNTER — TELEPHONE (OUTPATIENT)
Dept: FAMILY MEDICINE | Facility: CLINIC | Age: 56
End: 2021-02-26

## 2021-02-26 PROBLEM — N18.1 CKD STAGE G1/A2, GFR > 90 AND ALBUMIN CREATININE RATIO 30-299 MG/G: Status: ACTIVE | Noted: 2021-02-26

## 2021-02-26 NOTE — TELEPHONE ENCOUNTER
Pcs called and LVM to assist on scheduling Echo that PCP had ordered for his appointment on 2/25/21. No answer will do another attempt.     Jensen Jiménez MA

## 2021-03-05 NOTE — TELEPHONE ENCOUNTER
03/05/21  Last Attempt to contact Ochoa but was not able to speak with patient, LVM about the echo that was ordered and for pt to contact [facility] for assistance. Also attempted to call his work number which is his wife's number and was not able to LVM as it is full.     Jensen Jiménez MA

## 2021-03-26 DIAGNOSIS — Z79.4 TYPE 2 DIABETES MELLITUS WITH OTHER CIRCULATORY COMPLICATION, WITH LONG-TERM CURRENT USE OF INSULIN (H): ICD-10-CM

## 2021-03-26 DIAGNOSIS — E11.59 TYPE 2 DIABETES MELLITUS WITH OTHER CIRCULATORY COMPLICATION, WITH LONG-TERM CURRENT USE OF INSULIN (H): ICD-10-CM

## 2021-05-11 DIAGNOSIS — Z79.4 TYPE 2 DIABETES MELLITUS WITH OTHER CIRCULATORY COMPLICATION, WITH LONG-TERM CURRENT USE OF INSULIN (H): ICD-10-CM

## 2021-05-11 DIAGNOSIS — E11.59 TYPE 2 DIABETES MELLITUS WITH OTHER CIRCULATORY COMPLICATION, WITH LONG-TERM CURRENT USE OF INSULIN (H): ICD-10-CM

## 2021-05-11 NOTE — TELEPHONE ENCOUNTER
Test strips last filled 10/30/2020 for #100    Thank you,    Yamile Baer, PharmD  [facility]  [phone]

## 2022-01-18 DIAGNOSIS — Z79.4 TYPE 2 DIABETES MELLITUS WITH OTHER CIRCULATORY COMPLICATION, WITH LONG-TERM CURRENT USE OF INSULIN (H): ICD-10-CM

## 2022-01-18 DIAGNOSIS — E11.59 TYPE 2 DIABETES MELLITUS WITH OTHER CIRCULATORY COMPLICATION, WITH LONG-TERM CURRENT USE OF INSULIN (H): ICD-10-CM

## 2022-01-19 ENCOUNTER — TELEPHONE (OUTPATIENT)
Dept: FAMILY MEDICINE | Facility: CLINIC | Age: 57
End: 2022-01-19
Payer: COMMERCIAL

## 2022-01-19 RX ORDER — METFORMIN HCL 500 MG
2000 TABLET, EXTENDED RELEASE 24 HR ORAL DAILY
Qty: 120 TABLET | Refills: 11 | OUTPATIENT
Start: 2022-01-19

## 2022-01-19 NOTE — TELEPHONE ENCOUNTER
Patient not seen since 2/2021. Needs DM follow-up appointment at [facility]. 2 month limited refill already given once in 12/2021. Refill declined.    Medication Refill Denied  Reason: Patient needs: provider visit  Provider: I have not called the patient about the Rx denial, please call.  PCS: Please notify the pharmacy, Please contact the patient to explain reasoning provided above and to schedule the patient for a provider visit and lab tests, A1c etc. with preferably me or anyone on the [facility] team (if not available can be anyone).    Once patient makes an appointment please send back to me to    order temporary refill so that the patient will not run out of medication prior to the scheduled visit.        Negar Reyes,   Family Medicine PGY-2  [facility], [facility]   Pronouns: She/Hers

## 2022-01-19 NOTE — TELEPHONE ENCOUNTER
[facility] phone call message- patient requesting a refill:    Full Medication Name: insulin aspart (NOVOLOG FLEXPEN) 100 UNIT/ML pen  insulin glargine (LANTUS PEN) 100 UNIT/ML pen        Pharmacy confirmed as    [id number] - [place], [place] - [address]  [address]  [facility] [address]  Phone: [phone] Fax: [phone]    F: Yes    Additional Comments: completely out     OK to leave a message on voice mail? Yes    Primary language: Stateless      needed? No    Call taken on January 19, 2022 at 3:30 PM by Ame Mary

## 2022-01-25 ENCOUNTER — TELEPHONE (OUTPATIENT)
Dept: FAMILY MEDICINE | Facility: CLINIC | Age: 57
End: 2022-01-25
Payer: COMMERCIAL

## 2022-01-25 RX ORDER — METFORMIN HCL 500 MG
2000 TABLET, EXTENDED RELEASE 24 HR ORAL DAILY
Qty: 120 TABLET | Refills: 0 | Status: SHIPPED | OUTPATIENT
Start: 2022-01-25 | End: 2022-02-04

## 2022-01-25 NOTE — TELEPHONE ENCOUNTER
Limited refill sent to [facility] now that pt has appointment scheduled.       Type 2 diabetes mellitus with other circulatory complication, with long-term current use of insulin (H)  -     metFORMIN (GLUCOPHAGE-XR) 500 MG 24 hr tablet; Take 4 tablets (2,000 mg) by mouth daily      Negar Reyes DO  Family Medicine PGY-2  [facility], [facility]   Pronouns: She/Hers

## 2022-01-25 NOTE — TELEPHONE ENCOUNTER
[facility] has contacted patient with  assistance (ID:[id number]) and was able to schedule patient with PCP on 2/4/22.    Jensen Jiménez MA

## 2022-01-25 NOTE — TELEPHONE ENCOUNTER
Pcs called and was able to connect with patient and schedule him for a office visit with PCP for med check and DM follow up.     Jensen Jiménez MA on 1/25/2022 at 9:46 AM

## 2022-02-02 NOTE — PROGRESS NOTES
Assessment & Plan     Type 2 diabetes mellitus with other circulatory complication, with long-term current use of insulin (H)  Pt with uncontrolled type 2 diabetes. A1c 2 years ago was 10, 1 year ago was 8.2 and today again 8.2 Unsurprisingly A1c has not gone down as pt did not tolerate the Farxiga (see HPI) and stopped taking the medication. Goal <7 given his young age and comorbidities. Will try to add GLP-1 for weight loss benefits as well as DM control. Will check and record home BG readings and bring back for 1 month follow-up.     Next visit:  - Discuss Bydureon   - Check glucose log  - Discuss diabetes eye exam and foot exam   - Review lab results from today     - Basic metabolic panel  - Hemoglobin A1c  - Albumin Random Urine Quantitative with Creat Ratio  - Basic metabolic panel  - Hemoglobin A1c  - metFORMIN (GLUCOPHAGE-XR) 500 MG 24 hr tablet  Dispense: 360 tablet; Refill: 1  - insulin aspart (NOVOLOG FLEXPEN) 100 UNIT/ML pen  Dispense: 60 mL; Refill: 3  - insulin glargine (LANTUS PEN) 100 UNIT/ML pen  Dispense: 45 mL; Refill: 3  - exenatide ER (BYDUREON BCISE) 2 MG/0.85ML auto-injector  Dispense: 3.4 mL; Refill: 3    Hyperlipidemia LDL goal <70  - Lipid panel    Coronary artery disease involving native heart without angina pectoris, unspecified vessel or lesion type  Pt noticing some dyspnea on exertion, no chest pain. High risk for angina giving his CAD hx. Was referred last year to get stress echo, pt did not get. Will re-order and route to care coordinator to assist in scheduling.   - Lipid panel  - aspirin (ASA) 81 MG EC tablet  Dispense: 90 tablet; Refill: 1  - Echocardiogram Exercise stress test    Essential hypertension with goal blood pressure less than 140/90  Controlled.   - metoprolol succinate ER (TOPROL-XL) 50 MG 24 hr tablet  Dispense: 90 tablet; Refill: 1  - atorvastatin (LIPITOR) 80 MG tablet  Dispense: 90 tablet; Refill: 1    Current tobacco use  Discussed other lifestyle modifications  to help quit smoking. Will trial nicotine patches, discussed how we can go down on the patches if he is doing well over time slowly. Re-evaluate in 1 month follow-up.   - nicotine (NICORETTE) 2 MG gum  Dispense: 50 each; Refill: 1  - nicotine (NICODERM CQ) 21 MG/24HR 24 hr patch  Dispense: 30 patch; Refill: 1    Review of external notes as documented elsewhere in note  Review of the result(s) of each unique test - see HPI  Ordering of each unique test  Prescription drug management    Tobacco Cessation:   reports that he has been smoking cigarettes. He has been smoking about 0.00 packs per day. He has never used smokeless tobacco.  Tobacco Cessation Action Plan: Pharmacotherapies : Nicotine patch and other Nicotine replacement  Self help information given to patient    Return in about 4 weeks (around 3/4/2022).    Negar Reyes DO  Family Medicine PGY-2  [facility], [facility]   Pronouns: She/Hers      Subjective   Don is a 57 year old who presents for the following health issues.    HPI     Diabetes Follow-up      Patient is checking blood sugars: Checks occasionally at home     When eats something 300      Before eating in morning numbers 128, 116, 143          -Last A1C was   Lab Results   Component Value Date    A1C 8.2 02/25/2021    A1C 10.0 12/31/2019    A1C 10.3 07/01/2019    A1C 8.0 12/06/2016    A1C 8.8 08/17/2016        Diabetic concerns: None    Chest Pain or exercise related calf pain (claudication):no     Symptoms of hypoglycemia (low blood sugar): none     Paresthesias (numbness or burning in feet) or sores: No     Diabetic eye exam within the last year?: No     Dapagliflozin 10 mg daily - Not taking this one, saw blood in urine, a few times. Stopped taking it. Took it for 1 week.   Metformin 2,000 daily XR   Aspart, small meal 5-6 units, middle meal 14 units, with meals  Glargine 30 units bedtime    On an Ace-I  Lisinopril 5 mg daily    Reviewed visit  "2020:  Increased novolog with meals to 14 unit(s) tid, since post-prandial sugars are highest  Plan was to increase lantus next    Reviewed visit 2021:  Previously uncontrolled with A1C from 1 year ago at 10, had insulin increased since then. Now improved to 8.2, goal < 7 given his young age and comorbidities. Will add SGLT2 inhibitor given his comorbid CAD and HTN. Counseled about expectations.  Patient never got stress echo ordered last year    Statin:  Atorvastatin 80 mg - taking it, yes everyday     HTN:  Lisinopril  Metoprolol   BP controlled todaay?     Preventive:  Tobacco cessation  - pack lasts 3 days, wants to quit   Colorectal cancer screening - ran out of time, will discuss next visit      Review of Systems   Constitutional, HEENT, cardiovascular, pulmonary, gi and gu systems are negative, except as otherwise noted.      Objective    /81   Pulse 90   Temp 98  F (36.7  C) (Oral)   Resp 16   Ht 1.803 m (5' 11\")   Wt 93 kg (205 lb)   SpO2 99%   BMI 28.59 kg/m    Body mass index is 28.59 kg/m .  Physical Exam   General: Alert and oriented, in no acute distress.  Skin: Warm and dry, no abnormalities noted.  Eyes: Extra-ocular muscles grossly intact, pupils equal.  ENT: Speech intact, nasal passages open, no hearing impairment noted.  CV: S1 S2 RRR. No murmur, rub, gallop. No cyanosis or pallor, warm and well perfused. Radial and TP pulses +2 equal bilaterally.   Respiratory: CTAB. No w/r/r. No respiratory distress, no accessory muscle use.  Neuro: Gait and station normal, comprehension intact. Gross and fine motor skills intact.   Psychiatric: Mood and affect appear normal.   Extremities: Warm, able to move all four extremities at will. No LE edema.     Results for orders placed or performed in visit on 02/04/22 (from the past 24 hour(s))   Hemoglobin A1c   Result Value Ref Range    Hemoglobin A1C 8.2 (H) 0.0 - 5.6 %                 "

## 2022-02-04 ENCOUNTER — OFFICE VISIT (OUTPATIENT)
Dept: FAMILY MEDICINE | Facility: CLINIC | Age: 57
End: 2022-02-04
Payer: COMMERCIAL

## 2022-02-04 VITALS
RESPIRATION RATE: 16 BRPM | WEIGHT: 205 LBS | HEART RATE: 90 BPM | OXYGEN SATURATION: 99 % | HEIGHT: 71 IN | BODY MASS INDEX: 28.7 KG/M2 | SYSTOLIC BLOOD PRESSURE: 130 MMHG | TEMPERATURE: 98 F | DIASTOLIC BLOOD PRESSURE: 81 MMHG

## 2022-02-04 DIAGNOSIS — Z79.4 TYPE 2 DIABETES MELLITUS WITH OTHER CIRCULATORY COMPLICATION, WITH LONG-TERM CURRENT USE OF INSULIN (H): Primary | ICD-10-CM

## 2022-02-04 DIAGNOSIS — I10 ESSENTIAL HYPERTENSION WITH GOAL BLOOD PRESSURE LESS THAN 140/90: ICD-10-CM

## 2022-02-04 DIAGNOSIS — E78.5 HYPERLIPIDEMIA LDL GOAL <70: ICD-10-CM

## 2022-02-04 DIAGNOSIS — E11.59 TYPE 2 DIABETES MELLITUS WITH OTHER CIRCULATORY COMPLICATION, WITH LONG-TERM CURRENT USE OF INSULIN (H): Primary | ICD-10-CM

## 2022-02-04 DIAGNOSIS — I25.10 CORONARY ARTERY DISEASE INVOLVING NATIVE HEART WITHOUT ANGINA PECTORIS, UNSPECIFIED VESSEL OR LESION TYPE: ICD-10-CM

## 2022-02-04 DIAGNOSIS — Z72.0 CURRENT TOBACCO USE: ICD-10-CM

## 2022-02-04 LAB
ANION GAP SERPL CALCULATED.3IONS-SCNC: 3 MMOL/L (ref 3–14)
BUN SERPL-MCNC: 11 MG/DL (ref 7–30)
CALCIUM SERPL-MCNC: 8.6 MG/DL (ref 8.5–10.1)
CHLORIDE BLD-SCNC: 109 MMOL/L (ref 94–109)
CHOLEST SERPL-MCNC: 115 MG/DL
CO2 SERPL-SCNC: 25 MMOL/L (ref 20–32)
CREAT SERPL-MCNC: 0.79 MG/DL (ref 0.66–1.25)
FASTING STATUS PATIENT QL REPORTED: ABNORMAL
GFR SERPL CREATININE-BSD FRML MDRD: >90 ML/MIN/1.73M2
GLUCOSE BLD-MCNC: 150 MG/DL (ref 70–99)
HBA1C MFR BLD: 8.2 % (ref 0–5.6)
HDLC SERPL-MCNC: 36 MG/DL
LDLC SERPL CALC-MCNC: 61 MG/DL
NONHDLC SERPL-MCNC: 79 MG/DL
POTASSIUM BLD-SCNC: 4.4 MMOL/L (ref 3.4–5.3)
SODIUM SERPL-SCNC: 137 MMOL/L (ref 133–144)
TRIGL SERPL-MCNC: 88 MG/DL

## 2022-02-04 PROCEDURE — 83036 HEMOGLOBIN GLYCOSYLATED A1C: CPT | Performed by: STUDENT IN AN ORGANIZED HEALTH CARE EDUCATION/TRAINING PROGRAM

## 2022-02-04 PROCEDURE — 80061 LIPID PANEL: CPT | Performed by: STUDENT IN AN ORGANIZED HEALTH CARE EDUCATION/TRAINING PROGRAM

## 2022-02-04 PROCEDURE — 36415 COLL VENOUS BLD VENIPUNCTURE: CPT | Performed by: STUDENT IN AN ORGANIZED HEALTH CARE EDUCATION/TRAINING PROGRAM

## 2022-02-04 PROCEDURE — 80048 BASIC METABOLIC PNL TOTAL CA: CPT | Performed by: STUDENT IN AN ORGANIZED HEALTH CARE EDUCATION/TRAINING PROGRAM

## 2022-02-04 PROCEDURE — 99214 OFFICE O/P EST MOD 30 MIN: CPT | Mod: GC | Performed by: STUDENT IN AN ORGANIZED HEALTH CARE EDUCATION/TRAINING PROGRAM

## 2022-02-04 RX ORDER — METFORMIN HCL 500 MG
2000 TABLET, EXTENDED RELEASE 24 HR ORAL DAILY
Qty: 360 TABLET | Refills: 1 | Status: SHIPPED | OUTPATIENT
Start: 2022-02-04 | End: 2022-08-05

## 2022-02-04 RX ORDER — ATORVASTATIN CALCIUM 80 MG/1
80 TABLET, FILM COATED ORAL DAILY
Qty: 90 TABLET | Refills: 1 | Status: SHIPPED | OUTPATIENT
Start: 2022-02-04 | End: 2022-08-24

## 2022-02-04 RX ORDER — METOPROLOL SUCCINATE 50 MG/1
50 TABLET, EXTENDED RELEASE ORAL DAILY
Qty: 90 TABLET | Refills: 1 | Status: SHIPPED | OUTPATIENT
Start: 2022-02-04 | End: 2022-02-25

## 2022-02-04 RX ORDER — INSULIN ASPART 100 [IU]/ML
INJECTION, SOLUTION INTRAVENOUS; SUBCUTANEOUS
Qty: 60 ML | Refills: 3 | Status: SHIPPED | OUTPATIENT
Start: 2022-02-04 | End: 2022-08-05

## 2022-02-04 RX ORDER — NICOTINE 21 MG/24HR
1 PATCH, TRANSDERMAL 24 HOURS TRANSDERMAL EVERY 24 HOURS
Qty: 30 PATCH | Refills: 1 | Status: SHIPPED | OUTPATIENT
Start: 2022-02-04 | End: 2022-03-06

## 2022-02-04 RX ORDER — EXENATIDE 2 MG/.85ML
2 INJECTION, SUSPENSION, EXTENDED RELEASE SUBCUTANEOUS
Qty: 3.4 ML | Refills: 3 | Status: SHIPPED | OUTPATIENT
Start: 2022-02-04 | End: 2022-03-03

## 2022-02-04 ASSESSMENT — MIFFLIN-ST. JEOR: SCORE: 1782

## 2022-02-04 NOTE — PATIENT INSTRUCTIONS
Patient Education   Here is the plan from today's visit    1. Type 2 diabetes mellitus with other circulatory complication, with long-term current use of insulin (H)    Trying new diabetes medication!    - Basic metabolic panel; Future  - Hemoglobin A1c; Future  - Albumin Random Urine Quantitative with Creat Ratio; Future  - Basic metabolic panel  - Hemoglobin A1c  - metFORMIN (GLUCOPHAGE-XR) 500 MG 24 hr tablet; Take 4 tablets (2,000 mg) by mouth daily  Dispense: 360 tablet; Refill: 1  - insulin aspart (NOVOLOG FLEXPEN) 100 UNIT/ML pen; 14 units before big meals and 5 units after small meals. Need doctor's appointment before any refills!  Dispense: 60 mL; Refill: 3  - insulin glargine (LANTUS PEN) 100 UNIT/ML pen; Inject 30 Units Subcutaneous At Bedtime Need doctor's appointment before any refills!  Dispense: 45 mL; Refill: 3  - exenatide ER (BYDUREON BCISE) 2 MG/0.85ML auto-injector; Inject 2 mg Subcutaneous every 7 days  Dispense: 3.4 mL; Refill: 3    2. Hyperlipidemia LDL goal <70  - Lipid panel; Future  - Lipid panel    3. Coronary artery disease involving native heart without angina pectoris, unspecified vessel or lesion type  - Lipid panel; Future  - Lipid panel  - aspirin (ASA) 81 MG EC tablet; Take 1 tablet (81 mg) by mouth daily  Dispense: 90 tablet; Refill: 1    4. Essential hypertension with goal blood pressure less than 140/90  - metoprolol succinate ER (TOPROL-XL) 50 MG 24 hr tablet; Take 1 tablet (50 mg) by mouth daily Need doctor appointment before more refills.  Dispense: 90 tablet; Refill: 1  - atorvastatin (LIPITOR) 80 MG tablet; Take 1 tablet (80 mg) by mouth daily Need doctor appointment before more refills.  Dispense: 90 tablet; Refill: 1    5. Current tobacco use  - nicotine (NICORETTE) 2 MG gum; Place 1 each (2 mg) inside cheek every hour as needed for smoking cessation  Dispense: 50 each; Refill: 1  - nicotine (NICODERM CQ) 21 MG/24HR 24 hr patch; Place 1 patch onto the skin every 24 hours   Dispense: 30 patch; Refill: 1    Please call or return to clinic if your symptoms don't go away.    Follow up plan  No follow-ups on file.    Thank you for coming to [facility] today.  Lab Testing:  **If you had lab testing today and your results are reassuring or normal they will be mailed to you or sent through [employer] within 7 days.   **If the lab tests need quick action we will call you with the results.  **If you are having labs done on a different day, please call [phone] to schedule at [facility] or [phone] for other [facility] locations. Labs do not offer walk-in appointments.  The phone number we will call with results is # [phone] (home) [phone] (work). If this is not the best number please call our clinic and change the number.  Medication Refills:  If you need any refills please call your pharmacy and they will contact us.   If you need to  your refill at a new pharmacy, please contact the new pharmacy directly. The new pharmacy will help you get your medications transferred faster.   Scheduling:  If you have any concerns about today's visit or wish to schedule another appointment please call our office during normal business hours [phone] (8-5:00 M-F)  If a referral was made to an [facility] specialty provider and you do not get a call from central scheduling, please refer to directions on your visit summary or call our office during normal business hours for assistance.   If a Mammogram was ordered for you at the [facility] Center call [phone] to schedule or change your appointment.  If you had an XRay/CT/Ultrasound/MRI ordered the number is [phone] to schedule or change your radiology appointment.   [facility] has limited ultrasound appointments available on Wednesdays, if you would like your ultrasound at [facility], please call [phone] to schedule.   Medical Concerns:  If you have urgent medical concerns please  call [phone] at any time of the day.     Cecile Reyes, DO

## 2022-02-04 NOTE — LETTER
February 11, 2022      Don Niño  [address]  [facility] [address]        Dear ,    We are writing to inform you of your test results.    {results letter list:[id number]}    No results found from the In Basket message.    If you have any questions or concerns, please call the clinic at the number listed above.       Sincerely,

## 2022-02-04 NOTE — LETTER
February 28, 2022      Don Niño  [address]  [facility] [address]        Dear ,    We are writing to inform you of your test results.    {results letter list:[id number]}    No results found from the In Basket message.    If you have any questions or concerns, please call the clinic at the number listed above.       Sincerely,

## 2022-02-04 NOTE — Clinical Note
Hey,   Patient left before we could review scheduling for his Echo stress test. I was hoping we could reach out to him and help him schedule his stress Echo. Importantly he needs to NOT TAKE his Metoprolol (beta blocker) for 1 day prior to doing the test.     Negar Reyes,   Family Medicine PGY-2  [facility], [facility]   Pronouns: She/Hers

## 2022-02-04 NOTE — LETTER
February 28, 2022      Don Niño  [address]  [place] [facility] [address]        Dear ,    We are writing to inform you of your test results.    {results letter list:[id number]}    No results found from the In Basket message.    If you have any questions or concerns, please call the clinic at the number listed above.       Sincerely,

## 2022-02-08 ENCOUNTER — TELEPHONE (OUTPATIENT)
Dept: CARE COORDINATION | Facility: CLINIC | Age: 57
End: 2022-02-08
Payer: COMMERCIAL

## 2022-02-08 NOTE — TELEPHONE ENCOUNTER
Per PCP RN called pt to check in and see if they were able to  and start bydureon rx and how it injections were going.    Left VM with name and callback number.    Can transfer to any RN when pt calls back    Fabiisael Agrawal RN

## 2022-02-10 NOTE — TELEPHONE ENCOUNTER
RN called pt again and was able to reach. Pt picked up the bydureon and started last Friday. Pt said it was easy to use and has no questions. reviewed to inject 2mg every Friday now. Next follow up 2/28    Fabi Agrawal RN

## 2022-02-16 ENCOUNTER — TELEPHONE (OUTPATIENT)
Dept: FAMILY MEDICINE | Facility: CLINIC | Age: 57
End: 2022-02-16
Payer: COMMERCIAL

## 2022-02-16 NOTE — TELEPHONE ENCOUNTER
Pcs called with assistance of [employer]  (ID: [id number])  Called to schedule pt for echocardiogram that PCP had placed a referral for. Pt did not answer so we left a voice message for patient to contact the clinic for assistance. Pcs will attempt again to schedule patient.     Jensen Jiménez MA on 2/16/2022 at 11:42 AM

## 2022-02-25 NOTE — PROGRESS NOTES
Assessment & Plan     Type 2 diabetes mellitus with other circulatory complication, with long-term current use of insulin (H)  Pt with uncontrolled type 2 diabetes. A1c 2 years ago was 10, 1 year ago was 8.2 and Feb 2 2022 again 8.2 Unsurprisingly A1c has not gone down as pt did not tolerate the Farxiga (see HPI) and stopped taking the medication. Goal <7 given his young age and comorbidities. Added GLP-1 for weight loss benefits as well as DM control, February 2nd 2022. Pt reported having some initial trouble injecting medication, he discussed with our pharmacy today and they gave him some advice. Plan: if injection tomorrow still difficult patient will call to schedule RN visit for further teaching. Otherwise patient is tolerating the medication well. Recheck A1c early May 2022. If still elevated may have to adjust insulin dosing (is on short acting and long as well as Metformin).   - Orthopedic  Referral  - Albumin Random Urine Quantitative with Creat Ratio  - exenatide ER (BYDUREON BCISE) 2 MG/0.85ML auto-injector  Dispense: 3.4 mL; Refill: 3    Next visit:   Discuss ophthalmology for diabetes  Check glucose log   Discuss how Bydureon injections are going    Callus of foot   Patient with significant callosus on bilateral feet causing discomfort and rubbing in shoes with type 2 diabetes. Pt with good sensation (see foot exam below). Pt was picking at callus discussed risk of infection due to hx of diabetes, strongly recommended podiatry referral for further management instead of pt attempting measures at home.   - Podiatry referral as above    Stented coronary artery  Coronary artery disease involving native heart without angina pectoris, unspecified vessel or lesion type  Pt noticing some dyspnea on exertion, no chest pain. High risk for angina giving his CAD hx. Was referred last year to get stress echo, pt did not get. Pt still has not scheduled, but interested. Appearing to struggle to schedule on  his own - routed to care coordinator today for assistance. Continue statin and aspirin.     CKD stage G1/A2, GFR > 90 and albumin creatinine ratio  mg/g  Blood pressure controlled. Repeating microalbumin today. Could consider tighter blood pressure goal of <130/80. To be discussed in future visits.     Current tobacco use  Still smoking 5-6 cigarettes a day. Declines Lung CT screening. Discussed how he can use the patch and gum together. Pt going to try this.     Declined COVID-19 dose  Declined booster today.      Future visits: Smoking cessation, eye doctor, follow-up on Echo and podiatry, COVID-19 booster, Colorectal cancer screening discussion       Review of external notes as documented elsewhere in note  Review of the result(s) of each unique test - see end of note  Ordering of each unique test  Prescription drug management       Return in about 4 weeks (around 3/31/2022).   A1c should be rechecked early May 2022.     Negar Reyes, DO  Family Medicine PGY-2  [facility], [facility]   Pronouns: She/Hers      Subjective   Don is a 57 year old who presents for the following health issues.    HPI     Diabetes follow-up  Bydureon - how is it going?         Lost some of the medication?    Asked pharmacy here about it. They showed him better how to use itl.    Friday is his shot day   Plan - tomorrow tries himself if still struggling RN only visit   Check glucose log - didn't bring in glucometer  Fastings 140s in the morning, still having numbers low 200s after food     Reviewed BMP  Reviewed lipid - LDL 61     Med history:  Dapagliflozin 10 mg daily - Not taking this one, saw blood in urine, a few times. Stopped taking it. Took it for 1 week.   Metformin 2,000 daily XR   Aspart, small meal 5-6 units, middle meal 14 units, with meals  Glargine 30 units bedtime    Diabetic Eye - Last eye doctor last month but not diabetes (just glasses)      Plan to revisit after he takes care  "of other health issues   Diabetic Foot - would like help with callosus, no tingling, numbness   On an Ace-I  Lisinopril 5 mg daily    Health maintenance:  COVD-19 booster - doesn't want   Colorectal cancer - discuss next visit     Go to lab for microalbumin - will reorder today      Nicotine use - patches / gum   Wants to try to quit, worried about heart  5-6 cigarettes a day, mostly at night ]  Smoking since 1991   CT low dose lung screening - differs   Wasn't taking patch and gum together!  Willing to try     Still has not done Echo stress of heart  Having trouble scheduling   Good times to call - afternoon     Review of Systems   Constitutional, HEENT, cardiovascular, pulmonary, gi and gu systems are negative, except as otherwise noted.      Objective    /79   Pulse 92   Temp 98.6  F (37  C) (Oral)   Resp 16   Ht 1.785 m (5' 10.28\")   Wt 94.6 kg (208 lb 9.6 oz)   SpO2 97%   BMI 29.70 kg/m    Body mass index is 29.7 kg/m .  Physical Exam     General: Alert and oriented, in no acute distress.  Skin: Warm and dry, no abnormalities noted.  Eyes: Extra-ocular muscles grossly intact, pupils equal.  ENT: Speech intact, nasal passages open, no hearing impairment noted.  CV: S1 S2 RRR. No murmur, rub, gallop. No cyanosis or pallor, warm and well perfused. Radial and TP pulses +2 equal bilaterally.   Respiratory: CTAB. No w/r/r. No respiratory distress, no accessory muscle use.  Neuro: Gait and station normal, comprehension intact. Gross and fine motor skills intact.   Psychiatric: Mood and affect appear normal.   Extremities: Warm, able to move all four extremities at will. No LE edema.     Diabetic Foot Screen:  Any complaints of increased pain or numbness ? No   Is there a foot ulcer now or a history of foot ulcer? No   Does the foot have an abnormal shape?  YES   Are the nails thick, too long or ingrown?  YES   Are there any redness or open areas? No            Sensation Testing done at all points on the " diagram with monofilament     Right Foot: Sensation Normal at all points  Left Foot: Sensation Normal at all points     Patient with significant callosus on bilateral feet causing discomfort and rubbing in shoes.     Risk Category: 0- No loss of protective sensation  Performed by Cecile Reyes DO       Office Visit on 02/04/2022   Component Date Value Ref Range Status     Sodium 02/04/2022 137  133 - 144 mmol/L Final     Potassium 02/04/2022 4.4  3.4 - 5.3 mmol/L Final     Chloride 02/04/2022 109  94 - 109 mmol/L Final     Carbon Dioxide (CO2) 02/04/2022 25  20 - 32 mmol/L Final     Anion Gap 02/04/2022 3  3 - 14 mmol/L Final     Urea Nitrogen 02/04/2022 11  7 - 30 mg/dL Final     Creatinine 02/04/2022 0.79  0.66 - 1.25 mg/dL Final     Calcium 02/04/2022 8.6  8.5 - 10.1 mg/dL Final     Glucose 02/04/2022 150 (A) 70 - 99 mg/dL Final     GFR Estimate 02/04/2022 >90  >60 mL/min/1.73m2 Final    Effective December 21, 2021 eGFRcr in adults is calculated using the 2021 CKD-EPI creatinine equation which includes age and gender (Jaun et al., NE, DOI: 10.1056/BSYMio5467105)     Hemoglobin A1C 02/04/2022 8.2 (A) 0.0 - 5.6 % Final    Normal <5.7%   Prediabetes 5.7-6.4%    Diabetes 6.5% or higher     Note: Adopted from ADA consensus guidelines.     Cholesterol 02/04/2022 115  <200 mg/dL Final     Triglycerides 02/04/2022 88  <150 mg/dL Final     Direct Measure HDL 02/04/2022 36 (A) >=40 mg/dL Final     LDL Cholesterol Calculated 02/04/2022 61  <=100 mg/dL Final     Non HDL Cholesterol 02/04/2022 79  <130 mg/dL Final     Patient Fasting > 8hrs? 02/04/2022 Unknown   Final

## 2022-03-03 ENCOUNTER — OFFICE VISIT (OUTPATIENT)
Dept: FAMILY MEDICINE | Facility: CLINIC | Age: 57
End: 2022-03-03
Payer: COMMERCIAL

## 2022-03-03 VITALS
RESPIRATION RATE: 16 BRPM | BODY MASS INDEX: 29.86 KG/M2 | HEART RATE: 92 BPM | DIASTOLIC BLOOD PRESSURE: 79 MMHG | TEMPERATURE: 98.6 F | WEIGHT: 208.6 LBS | HEIGHT: 70 IN | SYSTOLIC BLOOD PRESSURE: 134 MMHG | OXYGEN SATURATION: 97 %

## 2022-03-03 DIAGNOSIS — L84 CALLUS OF FOOT: ICD-10-CM

## 2022-03-03 DIAGNOSIS — Z79.4 TYPE 2 DIABETES MELLITUS WITH OTHER CIRCULATORY COMPLICATION, WITH LONG-TERM CURRENT USE OF INSULIN (H): Primary | ICD-10-CM

## 2022-03-03 DIAGNOSIS — I25.10 CORONARY ARTERY DISEASE INVOLVING NATIVE HEART WITHOUT ANGINA PECTORIS, UNSPECIFIED VESSEL OR LESION TYPE: ICD-10-CM

## 2022-03-03 DIAGNOSIS — Z72.0 CURRENT TOBACCO USE: ICD-10-CM

## 2022-03-03 DIAGNOSIS — Z95.5 STENTED CORONARY ARTERY: ICD-10-CM

## 2022-03-03 DIAGNOSIS — Z28.21 COVID-19 VACCINE DOSE DECLINED: ICD-10-CM

## 2022-03-03 DIAGNOSIS — E11.59 TYPE 2 DIABETES MELLITUS WITH OTHER CIRCULATORY COMPLICATION, WITH LONG-TERM CURRENT USE OF INSULIN (H): Primary | ICD-10-CM

## 2022-03-03 DIAGNOSIS — N18.1 CKD STAGE G1/A2, GFR > 90 AND ALBUMIN CREATININE RATIO 30-299 MG/G: ICD-10-CM

## 2022-03-03 PROCEDURE — 99214 OFFICE O/P EST MOD 30 MIN: CPT | Mod: GC | Performed by: STUDENT IN AN ORGANIZED HEALTH CARE EDUCATION/TRAINING PROGRAM

## 2022-03-03 RX ORDER — EXENATIDE 2 MG/.85ML
2 INJECTION, SUSPENSION, EXTENDED RELEASE SUBCUTANEOUS
Qty: 3.4 ML | Refills: 3 | Status: SHIPPED | OUTPATIENT
Start: 2022-03-03 | End: 2022-08-05

## 2022-03-03 NOTE — PATIENT INSTRUCTIONS
Patient Education   Here is the plan from today's visit    1. Type 2 diabetes mellitus with other circulatory complication, with long-term current use of insulin (H)  - Orthopedic  Referral; Future  - Albumin Random Urine Quantitative with Creat Ratio; Future  - exenatide ER (BYDUREON BCISE) 2 MG/0.85ML auto-injector; Inject 2 mg Subcutaneous every 7 days  Dispense: 3.4 mL; Refill: 3    Call us if having trouble with the injections!!! We can show you in clinic.     2. Stented coronary artery  3. Coronary artery disease involving native heart without angina pectoris, unspecified vessel or lesion type  We will call you about Echo stress test for your heart.     4. CKD stage G1/A2, GFR > 90 and albumin creatinine ratio  mg/g  Checking your urine today for protein. Routine screening test.    5. Current tobacco use  Can take nicotine patch and gum same time.       Please call or return to clinic if your symptoms don't go away.    Repeat A1c end of April / Early May!!    Thank you for coming to [place] today.  Lab Testing:  **If you had lab testing today and your results are reassuring or normal they will be mailed to you or sent through "Mobile Location, IP" within 7 days.   **If the lab tests need quick action we will call you with the results.  **If you are having labs done on a different day, please call [phone] to schedule at [facility] or [phone] for other [facility] locations. Labs do not offer walk-in appointments.  The phone number we will call with results is # [phone] (home) [phone] (work). If this is not the best number please call our clinic and change the number.  Medication Refills:  If you need any refills please call your pharmacy and they will contact us.   If you need to  your refill at a new pharmacy, please contact the new pharmacy directly. The new pharmacy will help you get your medications transferred faster.   Scheduling:  If you have any  concerns about today's visit or wish to schedule another appointment please call our office during normal business hours [phone] (8-5:00 M-F)  If a referral was made to an [facility] [place] specialty provider and you do not get a call from central scheduling, please refer to directions on your visit summary or call our office during normal business hours for assistance.   If a Mammogram was ordered for you at the Breast Center call [phone] to schedule or change your appointment.  If you had an XRay/CT/Ultrasound/MRI ordered the number is [phone] to schedule or change your radiology appointment.   [facility] has limited ultrasound appointments available on Wednesdays, if you would like your ultrasound at [facility], please call [phone] to schedule.   Medical Concerns:  If you have urgent medical concerns please call [phone] at any time of the day.    Cecile Reyes, DO

## 2022-03-03 NOTE — PROGRESS NOTES
Preceptor Attestation:    I discussed the patient with the resident and evaluated the patient in person. I have verified the content of the note, which accurately reflects my assessment of the patient and the plan of care.   Supervising Physician:  Caroline Lujan DO.

## 2022-03-03 NOTE — Clinical Note
Hey I think I incorrectly routed to care coordination when this should be a PCS thing - could we call the patient and help him schedule Echo stress test? Ordered last visit. He prefers afternoon phone calls.     Negar Reyes,   Family Medicine PGY-2  [facility], [facility]   Pronouns: She/Hers

## 2022-03-03 NOTE — Clinical Note
Hey!  This patient is struggling to schedule the Echo stress test for his heart. I believe his last doctor also ordered it for him, he never got it done. I reordered last visit and he was telling me he couldn't figure out how to schedule. I think there is a language barrier issue but the pt repeatedly has declined an interpretor. Is there a way we could help him schedule to get the Echo stress test done (it was ordered in my last visit with him)?     Also FYI I put in a podiatry consult, may be nice if you have him on the phone to ask if he scheduled with them (this is way less urgent).     Negar Reyes, DO  Family Medicine PGY-2  [facility], [facility]   Pronouns: She/Hers

## 2022-05-06 DIAGNOSIS — E11.65 UNCONTROLLED TYPE 2 DIABETES MELLITUS WITH HYPERGLYCEMIA (H): ICD-10-CM

## 2022-05-20 DIAGNOSIS — Z79.4 TYPE 2 DIABETES MELLITUS WITH OTHER CIRCULATORY COMPLICATION, WITH LONG-TERM CURRENT USE OF INSULIN (H): ICD-10-CM

## 2022-05-20 DIAGNOSIS — E11.59 TYPE 2 DIABETES MELLITUS WITH OTHER CIRCULATORY COMPLICATION, WITH LONG-TERM CURRENT USE OF INSULIN (H): ICD-10-CM

## 2022-05-20 NOTE — TELEPHONE ENCOUNTER
[facility] phone call message- patient requesting a refill:    Full Medication Name: insulin glargine (LANTUS PEN) 100 UNIT/ML pen    Pharmacy confirmed as     [id number] - [place], [place] - [address]  [address]  [facility] [address]  Phone: [phone] Fax: [phone]    : Yes    Additional Comments: The patient is requesting a refill on this medication.     OK to leave a message on voice mail? Yes    Primary language: Chinese      needed? No    Call taken on May 20, 2022 at 3:13 PM by Elicia Patricia

## 2022-05-23 NOTE — TELEPHONE ENCOUNTER

## 2022-05-24 NOTE — TELEPHONE ENCOUNTER
Routed to  to schedule DM follow-up with me. Refilled Lantus 30 units at bedtime.     Negar Reyes DO  Family Medicine PGY-2  [facility], [facility]   Pronouns: She/Hers

## 2022-07-01 NOTE — PROGRESS NOTES
Preceptor Attestation:   Patient seen, evaluated and discussed with the resident. I have verified the content of the note, which accurately reflects my assessment of the patient and the plan of care.   Supervising Physician:  Sadaf Oleary MD    No

## 2022-07-15 ENCOUNTER — HOSPITAL ENCOUNTER (OUTPATIENT)
Dept: CARDIOLOGY | Facility: CLINIC | Age: 57
Discharge: HOME | End: 2022-07-15
Attending: STUDENT IN AN ORGANIZED HEALTH CARE EDUCATION/TRAINING PROGRAM | Admitting: STUDENT IN AN ORGANIZED HEALTH CARE EDUCATION/TRAINING PROGRAM
Payer: COMMERCIAL

## 2022-07-15 DIAGNOSIS — I25.10 CORONARY ARTERY DISEASE INVOLVING NATIVE HEART WITHOUT ANGINA PECTORIS, UNSPECIFIED VESSEL OR LESION TYPE: ICD-10-CM

## 2022-07-15 PROCEDURE — 255N000002 HC RX 255 OP 636: Performed by: INTERNAL MEDICINE

## 2022-07-15 PROCEDURE — 93325 DOPPLER ECHO COLOR FLOW MAPG: CPT | Mod: 26 | Performed by: INTERNAL MEDICINE

## 2022-07-15 PROCEDURE — 93016 CV STRESS TEST SUPVJ ONLY: CPT | Performed by: INTERNAL MEDICINE

## 2022-07-15 PROCEDURE — 93018 CV STRESS TEST I&R ONLY: CPT | Performed by: INTERNAL MEDICINE

## 2022-07-15 PROCEDURE — 93350 STRESS TTE ONLY: CPT | Mod: 26 | Performed by: INTERNAL MEDICINE

## 2022-07-15 PROCEDURE — 93321 DOPPLER ECHO F-UP/LMTD STD: CPT | Mod: 26 | Performed by: INTERNAL MEDICINE

## 2022-07-15 RX ADMIN — PERFLUTREN 5 ML: 6.52 INJECTION, SUSPENSION INTRAVENOUS at 14:22

## 2022-08-05 DIAGNOSIS — E11.65 UNCONTROLLED TYPE 2 DIABETES MELLITUS WITH HYPERGLYCEMIA (H): ICD-10-CM

## 2022-08-05 DIAGNOSIS — E11.59 TYPE 2 DIABETES MELLITUS WITH OTHER CIRCULATORY COMPLICATION, WITH LONG-TERM CURRENT USE OF INSULIN (H): ICD-10-CM

## 2022-08-05 DIAGNOSIS — Z79.4 TYPE 2 DIABETES MELLITUS WITH OTHER CIRCULATORY COMPLICATION, WITH LONG-TERM CURRENT USE OF INSULIN (H): ICD-10-CM

## 2022-08-23 DIAGNOSIS — I10 ESSENTIAL HYPERTENSION WITH GOAL BLOOD PRESSURE LESS THAN 140/90: ICD-10-CM

## 2022-08-24 RX ORDER — ATORVASTATIN CALCIUM 80 MG/1
80 TABLET, FILM COATED ORAL DAILY
Qty: 90 TABLET | Refills: 1 | Status: SHIPPED | OUTPATIENT
Start: 2022-08-24 | End: 2022-11-30

## 2022-09-13 DIAGNOSIS — E11.65 UNCONTROLLED TYPE 2 DIABETES MELLITUS WITH HYPERGLYCEMIA (H): ICD-10-CM

## 2022-09-13 DIAGNOSIS — Z79.4 TYPE 2 DIABETES MELLITUS WITH OTHER CIRCULATORY COMPLICATION, WITH LONG-TERM CURRENT USE OF INSULIN (H): ICD-10-CM

## 2022-09-13 DIAGNOSIS — E11.59 TYPE 2 DIABETES MELLITUS WITH OTHER CIRCULATORY COMPLICATION, WITH LONG-TERM CURRENT USE OF INSULIN (H): ICD-10-CM

## 2022-09-20 RX ORDER — EXENATIDE 2 MG/.85ML
2 INJECTION, SUSPENSION, EXTENDED RELEASE SUBCUTANEOUS
Qty: 3.4 ML | Refills: 0 | OUTPATIENT
Start: 2022-09-20

## 2022-09-20 RX ORDER — METFORMIN HCL 500 MG
TABLET, EXTENDED RELEASE 24 HR ORAL
Qty: 30 TABLET | Refills: 0 | OUTPATIENT
Start: 2022-09-20

## 2022-09-20 NOTE — TELEPHONE ENCOUNTER
Medication Refill Denied  Reason: Patient needs: provider visit and lab tests   Provider: I have not called the patient about the Rx denial, please call.  PCS: Please notify the pharmacy, Please contact the patient to explain reasoning provided above and to schedule the patient for a provider visit and lab tests for his diabetes with me or my color team (orange bees).    Once patient makes an appointment please send back to me to    order temporary refill so that the patient will not run out of medication prior to the scheduled visit.    Cecile Reyes, DO

## 2022-09-22 ENCOUNTER — TELEPHONE (OUTPATIENT)
Dept: CARE COORDINATION | Facility: CLINIC | Age: 57
End: 2022-09-22

## 2022-09-22 NOTE — TELEPHONE ENCOUNTER
Addendum: Patient does speak English, does not need .    Zuly Perrin  Care Coordinator  [facility]  [phone]

## 2022-09-22 NOTE — TELEPHONE ENCOUNTER
[facility] phone call message- general phone call:    Reason for call: Per , patient needs diabetic follow up appointment, medication refused until appointment scheduled      Action Desired: contacted patient via Armenian  ID # [id number]. Patient scheduled with  on 10/31/2022 @ 1:00p.m. Care Coordinator to call and remind patient of appointment a couple days before hand.    Return call needed: No    OK to leave a message on voice mail? Yes      Primary language: Armenian      needed? No    Call taken on September 22, 2022 at 10:18 AM by Zuly Perrin    Route to [facility] TRIAGE

## 2022-09-23 RX ORDER — EXENATIDE 2 MG/.85ML
2 INJECTION, SUSPENSION, EXTENDED RELEASE SUBCUTANEOUS
Qty: 3.4 ML | Refills: 1 | Status: SHIPPED | OUTPATIENT
Start: 2022-09-23 | End: 2022-11-07

## 2022-09-23 RX ORDER — METFORMIN HCL 500 MG
2000 TABLET, EXTENDED RELEASE 24 HR ORAL DAILY
Qty: 30 TABLET | Refills: 1 | Status: SHIPPED | OUTPATIENT
Start: 2022-09-23 | End: 2022-10-27

## 2022-09-23 NOTE — TELEPHONE ENCOUNTER
Refilled prescriptions to bridge patient to appointment. Per care coordination: Pt scheduled to see me 10/31/2022 at 1:00 pm. Interpretor scheduled for visit. Care coordination will reach out to him a couple days prior to remind him of visit.       Type 2 diabetes mellitus with other circulatory complication, with long-term current use of insulin (H)  -     metFORMIN (GLUCOPHAGE XR) 500 MG 24 hr tablet; Take 4 tablets (2,000 mg) by mouth daily  -     blood glucose (NO BRAND SPECIFIED) test strip; Use to test blood sugar 2-4 times daily or as directed.  -     exenatide ER (BYDUREON BCISE) 2 MG/0.85ML auto-injector; Inject 2 mg Subcutaneous every 7 days    Negar Reyes DO  Family Medicine PGY-3  [facility], [facility]   Pronouns: She/Hers

## 2022-10-03 DIAGNOSIS — E11.65 UNCONTROLLED TYPE 2 DIABETES MELLITUS WITH HYPERGLYCEMIA (H): ICD-10-CM

## 2022-10-28 ENCOUNTER — TELEPHONE (OUTPATIENT)
Dept: CARE COORDINATION | Facility: CLINIC | Age: 57
End: 2022-10-28

## 2022-10-28 NOTE — TELEPHONE ENCOUNTER
Care Coordinator, along with  ID # [id number] attempted to reach out to patient to remind him of upcoming appointment on Monday 10/31/22 @ 1:00p.m. However,  had to leave appointment information on patient voicemail.    Zuly Perrin  Care Coordinator  [facility]  [phone]

## 2022-11-03 ENCOUNTER — TELEPHONE (OUTPATIENT)
Dept: CARE COORDINATION | Facility: CLINIC | Age: 57
End: 2022-11-03

## 2022-11-03 NOTE — TELEPHONE ENCOUNTER
Per Eric Chacon Abigail, DO  Mary[place]uy, Zuly Caruso,     This patient ?again no showed for visit after I gave some refills. Could we try calling and setting up another appointment with him? I won't be able to give refills in the future without an office visit.       Care Coordinator reached out to patient to schedule follow up appointment (DM/Medication) via [facility] [employer], (Employee, no ID #) It was explained to the patient that  could not fill anymore medications until patient is seen in clinic. Patient is scheduled for 11/30/22 @ 10:20a.m. CC will call and remind patient again of appointment. [employer]  was ordered thru [employer] (telephone).      Zuly Perrin  Care Coordinator  [facility]  [phone]

## 2022-11-29 NOTE — TELEPHONE ENCOUNTER
11/29/22 Care Coordinator has reached out to patient via  ID# [id number].  left appointment information on patient voicemail and reminded patient that he needs to be seen for more refills.      Zuly Perrin  Care Coordinator  [facility]  [phone]

## 2022-11-30 ENCOUNTER — OFFICE VISIT (OUTPATIENT)
Dept: FAMILY MEDICINE | Facility: CLINIC | Age: 57
End: 2022-11-30
Payer: COMMERCIAL

## 2022-11-30 VITALS
HEIGHT: 71 IN | WEIGHT: 195 LBS | OXYGEN SATURATION: 100 % | TEMPERATURE: 97.4 F | DIASTOLIC BLOOD PRESSURE: 78 MMHG | RESPIRATION RATE: 18 BRPM | SYSTOLIC BLOOD PRESSURE: 134 MMHG | BODY MASS INDEX: 27.3 KG/M2 | HEART RATE: 100 BPM

## 2022-11-30 DIAGNOSIS — I25.10 CORONARY ARTERY DISEASE INVOLVING NATIVE HEART WITHOUT ANGINA PECTORIS, UNSPECIFIED VESSEL OR LESION TYPE: ICD-10-CM

## 2022-11-30 DIAGNOSIS — I10 ESSENTIAL HYPERTENSION WITH GOAL BLOOD PRESSURE LESS THAN 140/90: ICD-10-CM

## 2022-11-30 DIAGNOSIS — E11.59 TYPE 2 DIABETES MELLITUS WITH OTHER CIRCULATORY COMPLICATION, WITH LONG-TERM CURRENT USE OF INSULIN (H): Primary | ICD-10-CM

## 2022-11-30 DIAGNOSIS — Z72.0 CURRENT TOBACCO USE: ICD-10-CM

## 2022-11-30 DIAGNOSIS — I25.10 CORONARY ARTERY DISEASE INVOLVING NATIVE CORONARY ARTERY OF NATIVE HEART, UNSPECIFIED WHETHER ANGINA PRESENT: ICD-10-CM

## 2022-11-30 DIAGNOSIS — Z53.20 LUNG CANCER SCREENING DECLINED BY PATIENT: ICD-10-CM

## 2022-11-30 DIAGNOSIS — M65.30 TRIGGER FINGER, ACQUIRED: ICD-10-CM

## 2022-11-30 DIAGNOSIS — Z79.4 TYPE 2 DIABETES MELLITUS WITH OTHER CIRCULATORY COMPLICATION, WITH LONG-TERM CURRENT USE OF INSULIN (H): Primary | ICD-10-CM

## 2022-11-30 DIAGNOSIS — Z28.21 COVID-19 VACCINATION DECLINED: ICD-10-CM

## 2022-11-30 DIAGNOSIS — Z53.20 COLON CANCER SCREENING DECLINED: ICD-10-CM

## 2022-11-30 LAB
CREAT UR-MCNC: 159 MG/DL
HBA1C MFR BLD: 7.3 % (ref 0–5.6)
MICROALBUMIN UR-MCNC: 262 MG/L
MICROALBUMIN/CREAT UR: 164.78 MG/G CR (ref 0–17)

## 2022-11-30 PROCEDURE — 36415 COLL VENOUS BLD VENIPUNCTURE: CPT | Performed by: STUDENT IN AN ORGANIZED HEALTH CARE EDUCATION/TRAINING PROGRAM

## 2022-11-30 PROCEDURE — 83036 HEMOGLOBIN GLYCOSYLATED A1C: CPT | Performed by: STUDENT IN AN ORGANIZED HEALTH CARE EDUCATION/TRAINING PROGRAM

## 2022-11-30 PROCEDURE — 82043 UR ALBUMIN QUANTITATIVE: CPT | Performed by: STUDENT IN AN ORGANIZED HEALTH CARE EDUCATION/TRAINING PROGRAM

## 2022-11-30 PROCEDURE — 90677 PCV20 VACCINE IM: CPT | Performed by: STUDENT IN AN ORGANIZED HEALTH CARE EDUCATION/TRAINING PROGRAM

## 2022-11-30 PROCEDURE — 90686 IIV4 VACC NO PRSV 0.5 ML IM: CPT | Performed by: STUDENT IN AN ORGANIZED HEALTH CARE EDUCATION/TRAINING PROGRAM

## 2022-11-30 PROCEDURE — 90472 IMMUNIZATION ADMIN EACH ADD: CPT | Performed by: STUDENT IN AN ORGANIZED HEALTH CARE EDUCATION/TRAINING PROGRAM

## 2022-11-30 PROCEDURE — 90471 IMMUNIZATION ADMIN: CPT | Performed by: STUDENT IN AN ORGANIZED HEALTH CARE EDUCATION/TRAINING PROGRAM

## 2022-11-30 PROCEDURE — 99214 OFFICE O/P EST MOD 30 MIN: CPT | Mod: 25 | Performed by: STUDENT IN AN ORGANIZED HEALTH CARE EDUCATION/TRAINING PROGRAM

## 2022-11-30 RX ORDER — GLUCOSAMINE HCL/CHONDROITIN SU 500-400 MG
CAPSULE ORAL
Qty: 100 EACH | Refills: 3 | Status: SHIPPED | OUTPATIENT
Start: 2022-11-30 | End: 2023-11-09

## 2022-11-30 RX ORDER — NAPROXEN 500 MG/1
500 TABLET ORAL 2 TIMES DAILY WITH MEALS
Qty: 28 TABLET | Refills: 0 | Status: SHIPPED | OUTPATIENT
Start: 2022-11-30 | End: 2022-12-14

## 2022-11-30 RX ORDER — LISINOPRIL 5 MG/1
5 TABLET ORAL DAILY
Qty: 30 TABLET | Refills: 0 | Status: SHIPPED | OUTPATIENT
Start: 2022-11-30 | End: 2023-01-23

## 2022-11-30 RX ORDER — LANCETS
EACH MISCELLANEOUS
Qty: 200 EACH | Refills: 6 | Status: SHIPPED | OUTPATIENT
Start: 2022-11-30

## 2022-11-30 RX ORDER — INSULIN ASPART 100 [IU]/ML
INJECTION, SOLUTION INTRAVENOUS; SUBCUTANEOUS
Qty: 30 ML | Refills: 11 | Status: SHIPPED | OUTPATIENT
Start: 2022-11-30 | End: 2023-08-14

## 2022-11-30 RX ORDER — ATORVASTATIN CALCIUM 80 MG/1
80 TABLET, FILM COATED ORAL DAILY
Qty: 90 TABLET | Refills: 1 | Status: SHIPPED | OUTPATIENT
Start: 2022-11-30 | End: 2023-03-27

## 2022-11-30 RX ORDER — METFORMIN HCL 500 MG
2000 TABLET, EXTENDED RELEASE 24 HR ORAL DAILY
Qty: 120 TABLET | Refills: 0 | Status: SHIPPED | OUTPATIENT
Start: 2022-11-30 | End: 2023-01-23

## 2022-11-30 NOTE — PROGRESS NOTES
Assessment & Plan     Type 2 diabetes mellitus with other circulatory complication, with long-term current use of insulin (H)  Pt with uncontrolled type 2 diabetes - though very close to goal, marketed improvement from ~ a year ago, was 10 in 2019 8.2,  Feb 2 2022 again 8.2, and 7.3 today. Likely due to starting Bydureon and subsequent weight loss with dietary changes and this medication. Has not tolerated Farxiga before. Goal <7 given his young age and comorbidities. Pt has not been consisently checking blood glucoses and doesn't have glucometer today, so difficulty to adjust insulin. Will return in 2 weeks with record of glucose log, maybe able to slightly adjust short acting or long acting depending on numbers. Albumin/creat ratio slightly elevated but < 300. On ACE-I blood pressure fairly well controlled today, could consider increasing to get to goal <130/80.   - Discuss increasing lisinopril next visit due to mildly increased albumin/creat ratio reflecting tighter blood pressure control goal <130/80 would be prudent   - Next visit review glucose logs, adjust insulin possibly   - Hemoglobin A1c  - Albumin Random Urine Quantitative with Creat Ratio  - metFORMIN (GLUCOPHAGE XR) 500 MG 24 hr tablet  Dispense: 120 tablet; Refill: 0  - exenatide ER (BYDUREON BCISE) 2 MG/0.85ML auto-injector  Dispense: 3.4 mL; Refill: 3  - insulin glargine (LANTUS PEN) 100 UNIT/ML pen  Dispense: 15 mL; Refill: 0  - blood glucose monitoring (NO BRAND SPECIFIED) meter device kit  Dispense: 1 kit; Refill: 0  - blood glucose (NO BRAND SPECIFIED) test strip  Dispense: 100 strip; Refill: 6  - thin (NO BRAND SPECIFIED) lancets  Dispense: 200 each; Refill: 6  - alcohol swab prep pads  Dispense: 100 each; Refill: 3  - insulin aspart (NOVOLOG FLEXPEN) 100 UNIT/ML pen  Dispense: 30 mL; Refill: 11    Coronary artery disease involving native coronary artery of native heart  Stented coronary artery  Pt noticing some dyspnea on exertion, with  "some chest pain. High risk for angina giving his CAD hx. Stress echo from this year was inconclusive as was unable to raise heart rate appropriately.   Struggles to schedule appointment on his own for special tests - routed to care coordinator today for assistance. Continue statin and aspirin.   - NM Lexiscan stress test  - aspirin (ASA) 81 MG EC tablet  Dispense: 90 tablet; Refill: 1    Current tobacco use  Patient does not want patch, would like more gum. Found it helpful cut from 6 cigs a day to 3. Encouraged improvement. Discussed cessation.   - nicotine (NICORETTE) 2 MG gum  Dispense: 50 each; Refill: 1    Essential hypertension with goal blood pressure less than 140/90  - lisinopril (ZESTRIL) 5 MG tablet  Dispense: 30 tablet; Refill: 0    Trigger finger, acquired  - Sports Medicine Clinic - [facility] Internal Referral  - Occupational Therapy Referral  - naproxen (NAPROSYN) 500 MG tablet  Dispense: 28 tablet; Refill: 0    COVID-19 vaccination declined  Continue to discuss with patient.     Colon cancer screening declined  Discussed various options most interested in Cologuard but not yet ready to get today. Continue to discuss in future.     Lung cancer screening declined by patient  Continue to discuss with patient.     Review of the result(s) of each unique test - a1c, microalbumin/creat ratio  Ordering of each unique test  Prescription drug management     BMI:   Estimated body mass index is 27.2 kg/m  as calculated from the following:    Height as of this encounter: 1.803 m (5' 11\").    Weight as of this encounter: 88.5 kg (195 lb).   Weight management plan: Discussed healthy diet and exercise guidelines   Patient has lost weight while on Bydureon with lifestyle changes.     Return in about 2 weeks (around 12/14/2022) for bring glucometer for diabetes follow-up.    Negar Reyes DO  Family Medicine PGY-3  [facility], [facility]   Pronouns: She/Hers      Subjective   Ochoa is " a 57 year old presenting for the following health issues:  Diabetes      HPI     Type 2 Diabetes  Nicotine use disorder  Chest pain w/ exercise   Blood sugars range 100 - 300   But battery ran out on machine so hasn't been checking recently?   Eye doctor - last saw them 3 months ago, no issues   Foot issues - no infections, tingling, numbness   Diet - wheat bread, brown rice, chicken, doesn't use much if any oil, fish, vegetables   Exercise - 3 days a week, walking, uses machine elliptical,, sometimes running machine   Still occasionally has chest pain with exercise, but not at rest   Hasn't happened in the past month   Reviewed exercise stress test - non conclusive   Has not tolerated Farxiga in the past   Met with Pharm D today   Novolog 14 lunch dinner, and 5 with breakfast   Lantus 30 units   Takes statin Atorvastatin 80 mg daily, ASA   Metformin 2,000 daily XR   On an Ace-I  Lisinopril 5 mg daily  Metoprolol 50 mg daily - has not used it for two months   Reviewed echo stress test with patient - non diagnostic did not reach heart rate target   Still smoking - 3 times a week, less than before !  Was smoking 5-6 cigarrettes a day, smoking since 1991   Does not want low dose lung screen   Encouraged him on cutting back, likes the gum, not interested in using gum + patch     Health Maintenance:  Declines influenza and covid-19 vaccine  Declines colon cancer screening     Labs:   A1c today 7.3   Previously 2/4/2022 and 2/25/2021 A1c was 8.2  Due for BMP in February  Due for Microalbumin today     Declines COVID-19 vaccine  Would like flu and pneumonia   Discussed colon cancer screening options - decliens all, did not know of cologuard so reviewed, may be interested in the future     Hand / Finger Pain:    Can't extend easily finger   Hurts below 3rd metacarpal right hand   Has been going a few months     Review of Systems   Constitutional, HEENT, cardiovascular, pulmonary, GI, , musculoskeletal, neuro, skin,  "endocrine and psych systems are negative, except as otherwise noted.      Objective    /78   Pulse 100   Temp 97.4  F (36.3  C)   Resp 18   Ht 1.803 m (5' 11\")   Wt 88.5 kg (195 lb)   SpO2 100%   BMI 27.20 kg/m    Body mass index is 27.2 kg/m .     Physical Exam   General: Alert and oriented, in no acute distress.  Skin: Warm and dry, no abnormalities noted.  Eyes: Extra-ocular muscles grossly intact, pupils equal.  ENT: Speech intact, nasal passages open, no hearing impairment noted.  CV: S1 S2 RRR. No murmur, rub, gallop. No cyanosis or pallor, warm and well perfused. Radial and TP pulses +2 equal bilaterally.   Respiratory: CTAB. No w/r/r. No respiratory distress, no accessory muscle use.  Neuro: Gait and station normal, comprehension intact. Gross and fine motor skills intact.   Psychiatric: Mood and affect appear normal.   Extremities: Warm, able to move all four extremities at will. No LE edema. Right hand, 3rd MCP joint tenderness to palpation, catching noted on extension of finger, no skye Dupuytren's contracture.        Results for orders placed or performed in visit on 11/30/22 (from the past 24 hour(s))   Hemoglobin A1c   Result Value Ref Range    Hemoglobin A1C 7.3 (H) 0.0 - 5.6 %   Albumin Random Urine Quantitative with Creat Ratio   Result Value Ref Range    Albumin Urine mg/L 262.0 mg/L    Albumin Urine mg/g Cr 164.78 (H) 0.00 - 17.00 mg/g Cr    Creatinine Urine mg/dL 159.0 mg/dL                   "

## 2022-11-30 NOTE — PROGRESS NOTES
Preceptor Attestation:    I discussed the patient with the resident and evaluated the patient in person. I have verified the content of the note, which accurately reflects my assessment of the patient and the plan of care.   Supervising Physician:  Flaquito Broussard MD.

## 2022-11-30 NOTE — PROGRESS NOTES
Metoprolol has not been used for 2 motsh    Novolo units for breakfast, 12-14 for meals. Will adjust for meal.   Lantus: 30 once a day     No glucometer.    Checks bs:  2 times a day  Range:lows: 120 mg/dl in the AM and   280-300 after meals in the afternoon and meals.      Lab Results   Component Value Date    A1C 7.3 2022    A1C 8.2 2022    A1C 8.2 2021    A1C 10.0 2019    A1C 10.3 2019    A1C 8.0 2016    A1C 8.8 2016     .

## 2022-11-30 NOTE — LETTER
11/30/2022         RE: Don Niño  [address]  [place] [address]        Dear Colleague,    Thank you for referring your patient, Don Niño, to the [facility]. Please see a copy of my visit note below.      Assessment & Plan     Type 2 diabetes mellitus with other circulatory complication, with long-term current use of insulin (H)  Pt with uncontrolled type 2 diabetes - though very close to goal, marketed improvement from ~ a year ago, was 10 in 2019 8.2 and Feb 2 2022 again 8.2. Likely due to starting Bydureon and subsequent weight loss with dietary changes and this medication. Has not tolerated Farxiga before. Goal <7 given his young age and comorbidities. Pt has not been consisently checking blood glucoses and doesn't have glucometer today, so difficulty to adjust insulin. Will return in 2 weeks with record of glucose log, maybe able to slightly adjust short acting or long acting depending on numbers. Albumin/creat ratio slightly elevated but < 300. On ACE-I blood pressure fairly well controlled today, could consider increasing to get to goal <130/80.   - Discuss increasing lisinopril next visit due to mildly increased albumin/creat ratio reflecting tighter blood pressure control goal <130/80 would be prudent   - Next visit review glucose logs, adjust insulin possibly   - Hemoglobin A1c  - Albumin Random Urine Quantitative with Creat Ratio  - metFORMIN (GLUCOPHAGE XR) 500 MG 24 hr tablet  Dispense: 120 tablet; Refill: 0  - exenatide ER (BYDUREON BCISE) 2 MG/0.85ML auto-injector  Dispense: 3.4 mL; Refill: 3  - insulin glargine (LANTUS PEN) 100 UNIT/ML pen  Dispense: 15 mL; Refill: 0  - blood glucose monitoring (NO BRAND SPECIFIED) meter device kit  Dispense: 1 kit; Refill: 0  - blood glucose (NO BRAND SPECIFIED) test strip  Dispense: 100 strip; Refill: 6  - thin (NO BRAND SPECIFIED) lancets  Dispense: 200 each; Refill: 6  - alcohol swab prep pads  Dispense: 100  "each; Refill: 3  - insulin aspart (NOVOLOG FLEXPEN) 100 UNIT/ML pen  Dispense: 30 mL; Refill: 11    Coronary artery disease involving native coronary artery of native heart  Stented coronary artery  Pt noticing some dyspnea on exertion, with some chest pain. High risk for angina giving his CAD hx. Stress echo from this year was inconclusive as was unable to raise heart rate appropriately.   Struggles to schedule appointment on his own for special tests - routed to care coordinator today for assistance. Continue statin and aspirin.   - NM Lexiscan stress test  - aspirin (ASA) 81 MG EC tablet  Dispense: 90 tablet; Refill: 1    Current tobacco use  Patient does not want patch, would like more gum. Found it helpful cut from 6 cigs a day to 3. Encouraged improvement. Discussed cessation.   - nicotine (NICORETTE) 2 MG gum  Dispense: 50 each; Refill: 1    Essential hypertension with goal blood pressure less than 140/90  - lisinopril (ZESTRIL) 5 MG tablet  Dispense: 30 tablet; Refill: 0    Trigger finger, acquired  - Sports Medicine Clinic - [facility] Internal Referral  - Occupational Therapy Referral  - naproxen (NAPROSYN) 500 MG tablet  Dispense: 28 tablet; Refill: 0    COVID-19 vaccination declined  Continue to discuss with patient.     Colon cancer screening declined  Discussed various options most interested in Cologuard but not yet ready to get today. Continue to discuss in future.     Lung cancer screening declined by patient  Continue to discuss with patient.     Review of the result(s) of each unique test - a1c, microalbumin/creat ratio  Ordering of each unique test  Prescription drug management     BMI:   Estimated body mass index is 27.2 kg/m  as calculated from the following:    Height as of this encounter: 1.803 m (5' 11\").    Weight as of this encounter: 88.5 kg (195 lb).   Weight management plan: Discussed healthy diet and exercise guidelines   Patient has lost weight while on Bydureon with lifestyle changes. "     Return in about 2 weeks (around 12/14/2022) for bring glucometer for diabetes follow-up.    Negar Reyes,   Family Medicine PGY-3  [facility], [facility]   Pronouns: She/Hers      Subjective   Don is a 57 year old presenting for the following health issues:  Diabetes      HPI     Type 2 Diabetes  Nicotine use disorder  Chest pain w/ exercise   Blood sugars range 100 - 300   But battery ran out on machine so hasn't been checking recently?   Eye doctor - last saw them 3 months ago, no issues   Foot issues - no infections, tingling, numbness   Diet - wheat bread, brown rice, chicken, doesn't use much if any oil, fish, vegetables   Exercise - 3 days a week, walking, uses machine elliptical,, sometimes running machine   Still occasionally has chest pain with exercise, but not at rest   Hasn't happened in the past month   Reviewed exercise stress test - non conclusive   Has not tolerated Farxiga in the past   Met with Pharm D today   Novolog 14 lunch dinner, and 5 with breakfast   Lantus 30 units   Takes statin Atorvastatin 80 mg daily, ASA   Metformin 2,000 daily XR   On an Ace-I  Lisinopril 5 mg daily  Metoprolol 50 mg daily - has not used it for two months   Reviewed echo stress test with patient - non diagnostic did not reach heart rate target   Still smoking - 3 times a week, less than before !  Was smoking 5-6 cigarrettes a day, smoking since 1991   Does not want low dose lung screen   Encouraged him on cutting back, likes the gum, not interested in using gum + patch     Health Maintenance:  Declines influenza and covid-19 vaccine  Declines colon cancer screening     Labs:   A1c today 7.3   Previously 2/4/2022 and 2/25/2021 A1c was 8.2  Due for BMP in February  Due for Microalbumin today     Declines COVID-19 vaccine  Would like flu and pneumonia   Discussed colon cancer screening options - decliens all, did not know of cologuard so reviewed, may be interested in the future  "    Hand / Finger Pain:    Can't extend easily finger   Hurts below 3rd metacarpal right hand   Has been going a few months     Review of Systems   Constitutional, HEENT, cardiovascular, pulmonary, GI, , musculoskeletal, neuro, skin, endocrine and psych systems are negative, except as otherwise noted.     Objective    /78   Pulse 100   Temp 97.4  F (36.3  C)   Resp 18   Ht 1.803 m (5' 11\")   Wt 88.5 kg (195 lb)   SpO2 100%   BMI 27.20 kg/m    Body mass index is 27.2 kg/m .     Physical Exam   General: Alert and oriented, in no acute distress.  Skin: Warm and dry, no abnormalities noted.  Eyes: Extra-ocular muscles grossly intact, pupils equal.  ENT: Speech intact, nasal passages open, no hearing impairment noted.  CV: S1 S2 RRR. No murmur, rub, gallop. No cyanosis or pallor, warm and well perfused. Radial and TP pulses +2 equal bilaterally.   Respiratory: CTAB. No w/r/r. No respiratory distress, no accessory muscle use.  Neuro: Gait and station normal, comprehension intact. Gross and fine motor skills intact.   Psychiatric: Mood and affect appear normal.   Extremities: Warm, able to move all four extremities at will. No LE edema. Right hand, 3rd MCP joint tenderness to palpation, catching noted on extension of finger, no skye Dupuytren's contracture.        Results for orders placed or performed in visit on 22 (from the past 24 hour(s))   Hemoglobin A1c   Result Value Ref Range    Hemoglobin A1C 7.3 (H) 0.0 - 5.6 %   Albumin Random Urine Quantitative with Creat Ratio   Result Value Ref Range    Albumin Urine mg/L 262.0 mg/L    Albumin Urine mg/g Cr 164.78 (H) 0.00 - 17.00 mg/g Cr    Creatinine Urine mg/dL 159.0 mg/dL                    Metoprolol has not been used for 2 motsh    Novolo units for breakfast, 12-14 for meals. Will adjust for meal.   Lantus: 30 once a day     No glucometer.    Checks bs:  2 times a day  Range:lows: 120 mg/dl in the AM and   280-300 after meals in the afternoon " and meals.      Lab Results   Component Value Date    A1C 7.3 11/30/2022    A1C 8.2 02/04/2022    A1C 8.2 02/25/2021    A1C 10.0 12/31/2019    A1C 10.3 07/01/2019    A1C 8.0 12/06/2016    A1C 8.8 08/17/2016     .        Preceptor Attestation:    I discussed the patient with the resident and evaluated the patient in person. I have verified the content of the note, which accurately reflects my assessment of the patient and the plan of care.   Supervising Physician:  Flaquito Broussard MD.                       Again, thank you for allowing me to participate in the care of your patient.        Sincerely,        Cecile Reyes, DO

## 2022-11-30 NOTE — Clinical Note
Hey,  Could we help this patient in schedule nuclear lexiscan (stress test). He has needed considerably help before with scheduling appointments. We tried doing an exercise one and unfortunately his heart rate didn't get high enough so neeeds a medicine type of scan.   Thanks!  Negar Reyes, DO Family Medicine PGY-3 [facility], [facility]  Pronouns: She/Hers

## 2022-11-30 NOTE — LETTER
November 30, 2022      Don Niño  [address]  [place] [address]        Dear ,    We are writing to inform you of your test results.    Test results indicate you may require additional follow up, see comment below.    We did note some protein in your urine. Sometimes this can happen when your blood pressure is too high and with disease like diabetes. I recommend you follow-up at [place] clinic so we can discuss more next steps. I'd like to check your blood pressure again and if still >130/80 increase your Lisinopril (diabetes medication).     Resulted Orders   Hemoglobin A1c   Result Value Ref Range    Hemoglobin A1C 7.3 (H) 0.0 - 5.6 %      Comment:      Normal <5.7%   Prediabetes 5.7-6.4%    Diabetes 6.5% or higher     Note: Adopted from ADA consensus guidelines.   Albumin Random Urine Quantitative with Creat Ratio   Result Value Ref Range    Albumin Urine mg/L 262.0 mg/L      Comment:      The reference ranges have not been established in urine albumin. The results should be integrated into the clinical context for interpretation.    Albumin Urine mg/g Cr 164.78 (H) 0.00 - 17.00 mg/g Cr      Comment:      Microalbuminuria is defined as an albumin:creatinine ratio of 17 to 299 for males and 25 to 299 for females. A ratio of albumin:creatinine of 300 or higher is indicative of overt proteinuria.  Due to biologic variability, positive results should be confirmed by a second, first-morning random or 24-hour timed urine specimen. If there is discrepancy, a third specimen is recommended. When 2 out of 3 results are in the microalbuminuria range, this is evidence for incipient nephropathy and warrants increased efforts at glucose control, blood pressure control, and institution of therapy with an angiotensin-converting-enzyme (ACE) inhibitor (if the patient can tolerate it).      Creatinine Urine mg/dL 159.0 mg/dL      Comment:      The reference ranges have not been established in urine  creatinine. The results should be integrated into the clinical context for interpretation.       If you have any questions or concerns, please call the clinic at the number listed above.     As we discussed at our last visit, please follow up with a clinic appointment to review these results further.Gambian Translation:Sida aan uga wada hadalnay booqashadayadii jose elias mcgee si kuldip eliasu jose enriqueto asimiijuan m.      Sincerely,      Negar Reyes DO  Family Medicine PGY-3  [facility]   Pronouns: She/Hers

## 2022-11-30 NOTE — Clinical Note
Joseph Oleary,  Do you do trigger finger injections in sports medicine clinic?   Best,  Negar Reyes, DO Family Medicine PGY-3 [facility], [facility]  Pronouns: She/Hers

## 2022-12-01 ENCOUNTER — TELEPHONE (OUTPATIENT)
Dept: CARE COORDINATION | Facility: CLINIC | Age: 57
End: 2022-12-01

## 2022-12-01 NOTE — TELEPHONE ENCOUNTER
"Care Coordinator scheduled NM Lexiscan Test for patient on 12/9/22 @ 1:30p.m (arrive at 1:15p.m) [address] [facility]  1st Floor, [facility] Waiting Room. [facility] [address]. Patient to avoid caffeine and alcohol 12 hours prior to test and NPO 3 hours prior. Test will take 3 hours. Contacted patient via [facility]  (employee, no ID#) All information was given to patient. Patient asked about his \"painful finger\".  wrote order for OT, trigger finger. CC explained to patient that they will be calling him directly to schedule. Scheduling did try calling patient earlier this afternoon. Patient had no other questions regarding appointment(s).      Zuly Perrin  Care Coordinator  [facility]  [phone]    "

## 2023-03-22 DIAGNOSIS — I10 ESSENTIAL HYPERTENSION WITH GOAL BLOOD PRESSURE LESS THAN 140/90: ICD-10-CM

## 2023-03-26 NOTE — TELEPHONE ENCOUNTER
"Last seen 11/30/22    Request for medication refill:  atorvastatin (LIPITOR) 80 MG tablet  Providers if patient needs an appointment and you are willing to give a one month supply please refill for one month and  send a letter/MyChart using \".SMILLIMITEDREFILL\" .smillimited and route chart to \"P SMI \" (Giving one month refill in non controlled medications is strongly recommended before denial)    If refill has been denied, meaning absolutely no refills without visit, please complete the smart phrase \".smirxrefuse\" and route it to the \"P SMI MED REFILLS\"  pool to inform the patient and the pharmacy.    Agustina Block RN        "

## 2023-03-27 RX ORDER — ATORVASTATIN CALCIUM 80 MG/1
TABLET, FILM COATED ORAL
Qty: 90 TABLET | Refills: 1 | Status: SHIPPED | OUTPATIENT
Start: 2023-03-27 | End: 2023-05-30

## 2023-08-10 DIAGNOSIS — I10 ESSENTIAL HYPERTENSION WITH GOAL BLOOD PRESSURE LESS THAN 140/90: ICD-10-CM

## 2023-08-10 DIAGNOSIS — Z79.4 TYPE 2 DIABETES MELLITUS WITH OTHER CIRCULATORY COMPLICATION, WITH LONG-TERM CURRENT USE OF INSULIN (H): ICD-10-CM

## 2023-08-10 DIAGNOSIS — E11.59 TYPE 2 DIABETES MELLITUS WITH OTHER CIRCULATORY COMPLICATION, WITH LONG-TERM CURRENT USE OF INSULIN (H): ICD-10-CM

## 2023-08-11 RX ORDER — LISINOPRIL 5 MG/1
5 TABLET ORAL DAILY
Qty: 30 TABLET | Refills: 1 | Status: SHIPPED | OUTPATIENT
Start: 2023-08-11 | End: 2023-08-14

## 2023-08-11 RX ORDER — METFORMIN HCL 500 MG
2000 TABLET, EXTENDED RELEASE 24 HR ORAL DAILY
Qty: 120 TABLET | Refills: 1 | Status: SHIPPED | OUTPATIENT
Start: 2023-08-11 | End: 2023-08-14

## 2023-08-14 ENCOUNTER — OFFICE VISIT (OUTPATIENT)
Dept: FAMILY MEDICINE | Facility: CLINIC | Age: 58
End: 2023-08-14
Payer: COMMERCIAL

## 2023-08-14 VITALS
HEART RATE: 98 BPM | RESPIRATION RATE: 16 BRPM | WEIGHT: 203.4 LBS | TEMPERATURE: 98.3 F | DIASTOLIC BLOOD PRESSURE: 82 MMHG | BODY MASS INDEX: 28.37 KG/M2 | SYSTOLIC BLOOD PRESSURE: 125 MMHG | OXYGEN SATURATION: 100 %

## 2023-08-14 DIAGNOSIS — Z79.4 TYPE 2 DIABETES MELLITUS WITH OTHER CIRCULATORY COMPLICATION, WITH LONG-TERM CURRENT USE OF INSULIN (H): Primary | ICD-10-CM

## 2023-08-14 DIAGNOSIS — I10 ESSENTIAL HYPERTENSION WITH GOAL BLOOD PRESSURE LESS THAN 140/90: ICD-10-CM

## 2023-08-14 DIAGNOSIS — Z71.6 ENCOUNTER FOR SMOKING CESSATION COUNSELING: ICD-10-CM

## 2023-08-14 DIAGNOSIS — E11.59 TYPE 2 DIABETES MELLITUS WITH OTHER CIRCULATORY COMPLICATION, WITH LONG-TERM CURRENT USE OF INSULIN (H): Primary | ICD-10-CM

## 2023-08-14 DIAGNOSIS — M65.30 TRIGGER FINGER, ACQUIRED: ICD-10-CM

## 2023-08-14 DIAGNOSIS — I25.10 CORONARY ARTERY DISEASE INVOLVING NATIVE HEART WITHOUT ANGINA PECTORIS, UNSPECIFIED VESSEL OR LESION TYPE: ICD-10-CM

## 2023-08-14 LAB
ANION GAP SERPL CALCULATED.3IONS-SCNC: 8 MMOL/L (ref 7–15)
BUN SERPL-MCNC: 15.1 MG/DL (ref 6–20)
CALCIUM SERPL-MCNC: 9.3 MG/DL (ref 8.6–10)
CHLORIDE SERPL-SCNC: 106 MMOL/L (ref 98–107)
CHOLEST SERPL-MCNC: 134 MG/DL
CREAT SERPL-MCNC: 0.84 MG/DL (ref 0.67–1.17)
CREAT UR-MCNC: 110 MG/DL
DEPRECATED HCO3 PLAS-SCNC: 23 MMOL/L (ref 22–29)
GFR SERPL CREATININE-BSD FRML MDRD: >90 ML/MIN/1.73M2
GLUCOSE SERPL-MCNC: 169 MG/DL (ref 70–99)
HBA1C MFR BLD: 8.6 % (ref 0–5.6)
HDLC SERPL-MCNC: 39 MG/DL
LDLC SERPL CALC-MCNC: 77 MG/DL
MICROALBUMIN UR-MCNC: 136 MG/L
MICROALBUMIN/CREAT UR: 123.64 MG/G CR (ref 0–17)
NONHDLC SERPL-MCNC: 95 MG/DL
POTASSIUM SERPL-SCNC: 4.5 MMOL/L (ref 3.4–5.3)
SODIUM SERPL-SCNC: 137 MMOL/L (ref 136–145)
TRIGL SERPL-MCNC: 88 MG/DL

## 2023-08-14 PROCEDURE — 99214 OFFICE O/P EST MOD 30 MIN: CPT | Mod: GC

## 2023-08-14 PROCEDURE — 83036 HEMOGLOBIN GLYCOSYLATED A1C: CPT

## 2023-08-14 PROCEDURE — 82570 ASSAY OF URINE CREATININE: CPT

## 2023-08-14 PROCEDURE — 80048 BASIC METABOLIC PNL TOTAL CA: CPT

## 2023-08-14 PROCEDURE — 80061 LIPID PANEL: CPT

## 2023-08-14 PROCEDURE — 36415 COLL VENOUS BLD VENIPUNCTURE: CPT

## 2023-08-14 PROCEDURE — 82043 UR ALBUMIN QUANTITATIVE: CPT

## 2023-08-14 RX ORDER — ASPIRIN 81 MG/1
81 TABLET ORAL DAILY
Qty: 90 TABLET | Refills: 1 | Status: SHIPPED | OUTPATIENT
Start: 2023-08-14 | End: 2024-04-16

## 2023-08-14 RX ORDER — INSULIN ASPART 100 [IU]/ML
INJECTION, SOLUTION INTRAVENOUS; SUBCUTANEOUS
Qty: 30 ML | Refills: 11 | Status: SHIPPED | OUTPATIENT
Start: 2023-08-14 | End: 2024-05-24

## 2023-08-14 RX ORDER — EXENATIDE 2 MG/.85ML
INJECTION, SUSPENSION, EXTENDED RELEASE SUBCUTANEOUS
Qty: 3.4 ML | Refills: 0 | Status: SHIPPED | OUTPATIENT
Start: 2023-08-14 | End: 2024-01-04

## 2023-08-14 RX ORDER — LISINOPRIL 5 MG/1
5 TABLET ORAL DAILY
Qty: 90 TABLET | Refills: 1 | Status: SHIPPED | OUTPATIENT
Start: 2023-08-14 | End: 2023-11-09

## 2023-08-14 RX ORDER — METFORMIN HCL 500 MG
2000 TABLET, EXTENDED RELEASE 24 HR ORAL DAILY
Qty: 120 TABLET | Refills: 1 | Status: SHIPPED | OUTPATIENT
Start: 2023-08-14 | End: 2024-02-14

## 2023-08-14 RX ORDER — INSULIN GLARGINE 100 [IU]/ML
INJECTION, SOLUTION SUBCUTANEOUS
Qty: 15 ML | Refills: 3 | Status: SHIPPED | OUTPATIENT
Start: 2023-08-14 | End: 2024-03-05

## 2023-08-14 NOTE — PROGRESS NOTES
Preceptor Attestation:   Patient seen, evaluated and discussed with the resident. I have verified the content of the note, which accurately reflects my assessment of the patient and the plan of care.   Supervising Physician:  Ady Giordano MD

## 2023-08-14 NOTE — PROGRESS NOTES
Assessment & Plan     Type 2 diabetes mellitus with other circulatory complication, with long-term current use of insulin (H)  Patients has been out of medication for 1.5 months. Reports at home blood glucose around 120 fasting and 200-300 postprandial. A1c was elevated today at 8.6%  (was 7.3% on 11/30/2022)--expected given he has not been taking medications. Will refill today at current dosages to avoid risk of hypoglycemia. Recheck in 3 months, expect improvement with return to normal regimen.  - Adult Eye  Referral  - Hemoglobin A1c  - Albumin Random Urine Quantitative with Creat Ratio  - Lipid panel  - Basic metabolic panel  - exenatide ER (BYDUREON BCISE) 2 MG/0.85ML auto-injector  Dispense: 3.4 mL; Refill: 0  - insulin aspart (NOVOLOG FLEXPEN) 100 UNIT/ML pen  Dispense: 30 mL; Refill: 11  - insulin glargine (LANTUS SOLOSTAR) 100 UNIT/ML pen  Dispense: 15 mL; Refill: 3  - metFORMIN (GLUCOPHAGE XR) 500 MG 24 hr tablet  Dispense: 120 tablet; Refill: 1    Trigger finger, acquired  Patient reports several months of trigger finger. Did have a referral to Hand OT and sports med at last visit 11/30/22 but neither happened. Will reorder referral today as well as hand therapy. Patient requested a new ice pack for home use--ordered. Next step would likely be injection if hand OT ineffective.  - ICE PACK  - Sports Medicine Clinic [facility] Referral  - Occupational Therapy Referral    Encounter for smoking cessation counseling  Patient has attempted to quit smoking several times previously using nicotine gum. Would like to try oral chantix. Discussed either 5 days of cessation before starting, or a gradual reduction in cigarette use after starting chantix, whichever is more tolerable to him. Counseled on side effects including insomnia and nightmares.   - varenicline (CHANTIX ANNELIESE) 0.5 MG X 11 & 1 MG X 42 tablet  Dispense: 53 tablet; Refill: 0  - nicotine (NICODERM CQ) 7 MG/24HR 24 hr patch  Dispense:  30 patch; Refill: 0    Essential hypertension with goal blood pressure less than 140/90  Patient's blood pressure was 125/82 mmHg today. Given 1.5 months without medication, is doing well on current dose of lisinopril.   - lisinopril (ZESTRIL) 5 MG tablet  Dispense: 90 tablet; Refill: 1    Coronary artery disease involving native heart without angina pectoris, unspecified vessel or lesion type  Patient declined reordering stress test given he has not experienced dyspnea for several months.  Prior stress test non-diagnostics as target HR was not achieved prior to patient needing to stop secondary to fatigue. Stress test was re-ordered 11/30/22 however did not happen. Patient asymptomatic today, cardiac exam normal, BP well controlled. Not interested in pursuing further workup at this time.  - aspirin 81 MG EC tablet  Dispense: 90 tablet; Refill: 1    Return in about 3 months (around 11/14/2023).    Warner Washington, MS3  Genoveva Gomez MD  [facility] WILFREDO Ochoa is a 58 year old, presenting for the following health issues:  RECHECK (Patient is having trouble with hands/fingers)        8/14/2023     1:39 PM   Additional Questions   Roomed by yoshi   Accompanied by self       HPI   Diabetes: Presents to have his diabetes checked. He is checking his blood sugar at home - most mornings before eating and after meals. In the morning around 120-130 fasting. After eating up in the 200s to 300s. When his blood sugar is that high he feels lethargic. This occurs once or twice a month. His lowest fasting BG is usually around 120. Six months ago it fell below 100 and he felt dizzy. Taking metformin in the afternoon postprandial. Taking 10U insulin with smaller meals. With larger meals using 15 Units. 30u lantus for long acting. Has been out of meds for last 1.5 months due to told by pharmacy he needed to see a provider for refills.      Trigger finger: Reports painful trigger finger in both hands  that is worse in the morning. Mild relief from ice, but needs more icepacks at home. Worse with movements. Works as a . Explains usually the pain occurs at home after he has been driving. Declines receiving a call from sports medicine after being referred in November 2022.     HTN: Has been out of lisinopril for 1.5 months. Requesting refill.     CAD: Denies dyspnea recently. Declines repeating stress test at this time.     Tobacco cessation: Patient has tried to quit several times, once going 5 months before returning to smoking. He is currently smoking around 10 cigarettes a day. Has tried using nicotine gum without much success. Has never used a patch or oral medications.       Objective    /82   Pulse 98   Temp 98.3  F (36.8  C)   Resp 16   Wt 92.3 kg (203 lb 6.4 oz)   SpO2 100%   BMI 28.37 kg/m    Body mass index is 28.37 kg/m .  Physical Exam  Constitutional:       Appearance: Normal appearance.   HENT:      Head: Normocephalic and atraumatic.      Nose: No congestion.   Eyes:      General: No scleral icterus.     Conjunctiva/sclera: Conjunctivae normal.   Cardiovascular:      Rate and Rhythm: Normal rate and regular rhythm.   Pulmonary:      Effort: Pulmonary effort is normal.      Breath sounds: Normal breath sounds. No wheezing.   Musculoskeletal:      Right lower leg: No edema.      Left lower leg: No edema.   Skin:     General: Skin is dry.   Neurological:      Mental Status: He is alert and oriented to person, place, and time.      Hand: Left hand 3rd MCP nodule palpated, tender. Right hand, 3rd MCP joint tenderness to palpation, catching noted on extension of finger         I was present with the medical student who participated in the service and in the documentation of this note. I have verified the history and personally performed the physical exam and medical decision making, and have verified the content of the note, which accurately reflects my assessment of the patient and the  plan of care.   Genoveva Gomez MD

## 2023-08-14 NOTE — PATIENT INSTRUCTIONS
Patient Education   Here is the plan from today's visit    1. Type 2 diabetes mellitus with other circulatory complication, with long-term current use of insulin (H)  - Adult Eye  Referral; Future  - Hemoglobin A1c; Future  - Albumin Random Urine Quantitative with Creat Ratio; Future  - Lipid panel; Future  - Basic metabolic panel; Future  - Basic metabolic panel  - Lipid panel  - Albumin Random Urine Quantitative with Creat Ratio  - Hemoglobin A1c  - exenatide ER (BYDUREON BCISE) 2 MG/0.85ML auto-injector; INJECT 2 MG UNDER THE SKIN EVERY 7 DAYS AS DIRECTED  Dispense: 3.4 mL; Refill: 0  - insulin aspart (NOVOLOG FLEXPEN) 100 UNIT/ML pen; 14 units before meals three times a day  Dispense: 30 mL; Refill: 11  - insulin glargine (LANTUS SOLOSTAR) 100 UNIT/ML pen; INJECT 30 UNITS UNDER THE SKIN AT BEDTIME.  Dispense: 15 mL; Refill: 3  - metFORMIN (GLUCOPHAGE XR) 500 MG 24 hr tablet; Take 4 tablets (2,000 mg) by mouth daily  Dispense: 120 tablet; Refill: 1    2. Trigger finger, acquired  - ICE PACK  - Sports Medicine Clinic - [place] Internal Referral; Future  - Occupational Therapy Referral; Future    3. Encounter for smoking cessation counseling  - varenicline (CHANTIX ANNELIESE) 0.5 MG X 11 & 1 MG X 42 tablet; Take 0.5 mg tab daily for 3 days, THEN 0.5 mg tab twice daily for 4 days, THEN 1 mg twice daily.  Dispense: 53 tablet; Refill: 0  - nicotine (NICODERM CQ) 7 MG/24HR 24 hr patch; Place 1 patch onto the skin every 24 hours  Dispense: 30 patch; Refill: 0    4. Essential hypertension with goal blood pressure less than 140/90  - lisinopril (ZESTRIL) 5 MG tablet; Take 1 tablet (5 mg) by mouth daily for 180 days  Dispense: 90 tablet; Refill: 1    5. Coronary artery disease involving native heart without angina pectoris, unspecified vessel or lesion type  - aspirin 81 MG EC tablet; Take 1 tablet (81 mg) by mouth daily  Dispense: 90 tablet; Refill: 1        Please call or return to clinic if your symptoms don't go  away.    Follow up plan  Return in about 3 months (around 11/14/2023).    Thank you for coming to [facility] today.  Lab Testing:  **If you had lab testing today and your results are reassuring or normal they will be mailed to you or sent through On The Bill within 7 days.   **If the lab tests need quick action we will call you with the results.  **If you are having labs done on a different day, please call [phone] to schedule at [facility] or [phone] for other [facility] Outpatient Lab locations. Labs do not offer walk-in appointments.  The phone number we will call with results is # [phone] (home) [phone] (work). If this is not the best number please call our clinic and change the number.  Medication Refills:  If you need any refills please call your pharmacy and they will contact us.   If you need to  your refill at a new pharmacy, please contact the new pharmacy directly. The new pharmacy will help you get your medications transferred faster.   Scheduling:  If you have any concerns about today's visit or wish to schedule another appointment please call our office during normal business hours [phone] (8-5:00 M-F). If you can no longer make a scheduled visit, please cancel via On The Bill or call us to cancel.   If a referral was made to an [facility] specialty provider and you do not get a call from central scheduling, please refer to directions on your visit summary or call our office during normal business hours for assistance.   If a Mammogram was ordered for you at the Breast Center call [phone] to schedule or change your appointment.  If you had an XRay/CT/Ultrasound/MRI ordered the number is [phone] to schedule or change your radiology appointment.   [facility] has limited ultrasound appointments available on Wednesdays, if you would like your ultrasound at [facility], please call [phone] to schedule.   Medical Concerns:  If you have urgent  medical concerns please call [phone] at any time of the day.    Genoveva Gomez MD

## 2023-08-15 RX ORDER — NICOTINE 21 MG/24HR
1 PATCH, TRANSDERMAL 24 HOURS TRANSDERMAL EVERY 24 HOURS
Qty: 30 PATCH | Refills: 1 | Status: SHIPPED | OUTPATIENT
Start: 2023-08-15

## 2023-08-21 NOTE — TELEPHONE ENCOUNTER
"Request for medication refill:Atorvastatin     Providers if patient needs an appointment and you are willing to give a one month supply please refill for one month and  send a letter/MyChart using \".SMILLIMITEDREFILL\" .smillimited and route chart to \"P SMI \" (Giving one month refill in non controlled medications is strongly recommended before denial)    If refill has been denied, meaning absolutely no refills without visit, please complete the smart phrase \".smirxrefuse\" and route it to the \"P [facility] MED REFILLS\"  pool to inform the patient and the pharmacy.    Lyla Cotton, [facility]        "
1 month limited rx sent. Letter sent.    Anthony Lim DO    
with patient

## 2023-10-17 ENCOUNTER — PATIENT OUTREACH (OUTPATIENT)
Dept: CARE COORDINATION | Facility: CLINIC | Age: 58
End: 2023-10-17
Payer: COMMERCIAL

## 2023-10-17 NOTE — PROGRESS NOTES
Clinic Care Coordination Contact  Program:  [facility]: [place]   Renewal: [employer]   Date Applied:     LIZZY Outreach:   10/17/23: CTA called to see if patient needed assistance with their [employer] Renewal. Patient declined needing assistance and no follow up needed   Symone Gongora  Care   [facility]  Clinic Care Coordination  [phone]      Health Insurance:      Referral/Screening:

## 2023-10-30 DIAGNOSIS — I10 ESSENTIAL HYPERTENSION WITH GOAL BLOOD PRESSURE LESS THAN 140/90: ICD-10-CM

## 2023-10-31 DIAGNOSIS — Z79.4 TYPE 2 DIABETES MELLITUS WITH OTHER CIRCULATORY COMPLICATION, WITH LONG-TERM CURRENT USE OF INSULIN (H): ICD-10-CM

## 2023-10-31 DIAGNOSIS — E11.59 TYPE 2 DIABETES MELLITUS WITH OTHER CIRCULATORY COMPLICATION, WITH LONG-TERM CURRENT USE OF INSULIN (H): ICD-10-CM

## 2023-10-31 NOTE — TELEPHONE ENCOUNTER
"Request for medication refill:  lisinopril (ZESTRIL) 5 MG tablet     Providers if patient needs an appointment and you are willing to give a one month supply please refill for one month and  send a letter/MyChart using \".SMILLIMITEDREFILL\" .smillimited and route chart to \"P [facility] \" (Giving one month refill in non controlled medications is strongly recommended before denial)    If refill has been denied, meaning absolutely no refills without visit, please complete the smart phrase \".smirxrefuse\" and route it to the \"P SMI MED REFILLS\"  pool to inform the patient and the pharmacy.    Arlette Elizabeth, [facility]      "

## 2023-11-09 RX ORDER — UBIQUINOL 100 MG
CAPSULE ORAL
Qty: 100 EACH | Refills: 3 | Status: SHIPPED | OUTPATIENT
Start: 2023-11-09

## 2023-11-09 RX ORDER — LISINOPRIL 5 MG/1
5 TABLET ORAL DAILY
Qty: 30 TABLET | Refills: 1 | Status: SHIPPED | OUTPATIENT
Start: 2023-11-09 | End: 2024-03-15

## 2023-12-28 DIAGNOSIS — Z79.4 TYPE 2 DIABETES MELLITUS WITH OTHER CIRCULATORY COMPLICATION, WITH LONG-TERM CURRENT USE OF INSULIN (H): ICD-10-CM

## 2023-12-28 DIAGNOSIS — E11.59 TYPE 2 DIABETES MELLITUS WITH OTHER CIRCULATORY COMPLICATION, WITH LONG-TERM CURRENT USE OF INSULIN (H): ICD-10-CM

## 2023-12-28 DIAGNOSIS — I10 ESSENTIAL HYPERTENSION WITH GOAL BLOOD PRESSURE LESS THAN 140/90: ICD-10-CM

## 2023-12-29 NOTE — TELEPHONE ENCOUNTER
"Request for medication refill:  atorvastatin (LIPITOR) 80 MG tablet     exenatide ER (BYDUREON BCISE) 2 MG/0.85ML auto-injector     Providers if patient needs an appointment and you are willing to give a one month supply please refill for one month and  send a letter/MyChart using \".SMILLIMITEDREFILL\" .smillimited and route chart to \"P [facility] \" (Giving one month refill in non controlled medications is strongly recommended before denial)    If refill has been denied, meaning absolutely no refills without visit, please complete the smart phrase \".smirxrefuse\" and route it to the \"P SMI MED REFILLS\"  pool to inform the patient and the pharmacy.    Arlette Elizabeth, [facility]      "

## 2024-01-04 RX ORDER — ATORVASTATIN CALCIUM 80 MG/1
80 TABLET, FILM COATED ORAL DAILY
Qty: 90 TABLET | Refills: 1 | Status: SHIPPED | OUTPATIENT
Start: 2024-01-04 | End: 2024-05-24

## 2024-01-04 RX ORDER — EXENATIDE 2 MG/.85ML
INJECTION, SUSPENSION, EXTENDED RELEASE SUBCUTANEOUS
Qty: 3.4 ML | Refills: 1 | Status: SHIPPED | OUTPATIENT
Start: 2024-01-04 | End: 2024-05-24

## 2024-01-04 NOTE — TELEPHONE ENCOUNTER
Medication Partially Refilled  A Bydureon Bcise refill for 60 days has been provided to bridge patient to next appointment.  Reason: Patient needs: provider visit and lab tests, A1C  Provider: I have not called the patient about the Rx denial, please call.  PCS: Please contact the patient to explain reasoning provided above and to schedule the patient for a provider visit and lab tests, A1c with me or any provider.    Stephane Lopez MD

## 2024-02-14 DIAGNOSIS — E11.59 TYPE 2 DIABETES MELLITUS WITH OTHER CIRCULATORY COMPLICATION, WITH LONG-TERM CURRENT USE OF INSULIN (H): ICD-10-CM

## 2024-02-14 DIAGNOSIS — Z79.4 TYPE 2 DIABETES MELLITUS WITH OTHER CIRCULATORY COMPLICATION, WITH LONG-TERM CURRENT USE OF INSULIN (H): ICD-10-CM

## 2024-02-14 RX ORDER — METFORMIN HCL 500 MG
2000 TABLET, EXTENDED RELEASE 24 HR ORAL DAILY
Qty: 120 TABLET | Refills: 1 | Status: SHIPPED | OUTPATIENT
Start: 2024-02-14 | End: 2024-05-24

## 2024-02-14 NOTE — TELEPHONE ENCOUNTER
"Request for medication refill:    metFORMIN (GLUCOPHAGE XR) 500 MG 24 hr tablet     Providers if patient needs an appointment and you are willing to give a one month supply please refill for one month and  send a letter/MyChart using \".SMILLIMITEDREFILL\" .smillimited and route chart to \"P [facility] \" (Giving one month refill in non controlled medications is strongly recommended before denial)    If refill has been denied, meaning absolutely no refills without visit, please complete the smart phrase \".smirxrefuse\" and route it to the \"P [facility] MED REFILLS\"  pool to inform the patient and the pharmacy.    Renee Villalobos MA      "

## 2024-02-15 NOTE — TELEPHONE ENCOUNTER
February 14, 2024      Don  [address]  [place] [address]          Dear Don,      A request for a refill on your Metformin prescription was sent to us from your pharmacy. I have sent a prescription to your pharmacy.     It is time for your recheck at the clinic so we can monitor the medication and continue to prescribe it safely. Please make clinic appointment with me or another provider at [facility] in the next one month.     Thank you for choosing us as your healthcare provider.      Sincerely,    Stephane Lopez MD

## 2024-03-05 DIAGNOSIS — E11.59 TYPE 2 DIABETES MELLITUS WITH OTHER CIRCULATORY COMPLICATION, WITH LONG-TERM CURRENT USE OF INSULIN (H): ICD-10-CM

## 2024-03-05 DIAGNOSIS — Z79.4 TYPE 2 DIABETES MELLITUS WITH OTHER CIRCULATORY COMPLICATION, WITH LONG-TERM CURRENT USE OF INSULIN (H): ICD-10-CM

## 2024-03-05 RX ORDER — INSULIN GLARGINE 100 [IU]/ML
INJECTION, SOLUTION SUBCUTANEOUS
Qty: 15 ML | Refills: 2 | Status: SHIPPED | OUTPATIENT
Start: 2024-03-05 | End: 2024-05-24

## 2024-03-05 NOTE — TELEPHONE ENCOUNTER
Medication Refilled  A refill has been provided to the patient to next appointment.  Provider: I have not called the patient about the Rx, please call.  PCS: Please contact the patient to schedule an diabetes follow up appointment.     Stephane Lopez MD

## 2024-03-15 DIAGNOSIS — I10 ESSENTIAL HYPERTENSION WITH GOAL BLOOD PRESSURE LESS THAN 140/90: ICD-10-CM

## 2024-03-15 RX ORDER — LISINOPRIL 5 MG/1
5 TABLET ORAL DAILY
Qty: 30 TABLET | Refills: 2 | Status: SHIPPED | OUTPATIENT
Start: 2024-03-15 | End: 2024-06-26

## 2024-03-15 NOTE — TELEPHONE ENCOUNTER
"Request for medication refill:  lisinopril (ZESTRIL) 5 MG tablet     Providers if patient needs an appointment and you are willing to give a one month supply please refill for one month and  send a letter/MyChart using \".SMILLIMITEDREFILL\" .smillimited and route chart to \"P [facility] \" (Giving one month refill in non controlled medications is strongly recommended before denial)    If refill has been denied, meaning absolutely no refills without visit, please complete the smart phrase \".smirxrefuse\" and route it to the \"P SMI MED REFILLS\"  pool to inform the patient and the pharmacy.    Arlette Elizabeth, [facility]      "
Medication refills given.   
0

## 2024-03-22 NOTE — ADDENDUM NOTE
Addended by: CAROLYN BUTCHER on: 11/8/2019 10:31 AM     Modules accepted: Orders    
[Negative] : Heme/Lymph

## 2024-04-16 DIAGNOSIS — I25.10 CORONARY ARTERY DISEASE INVOLVING NATIVE HEART WITHOUT ANGINA PECTORIS, UNSPECIFIED VESSEL OR LESION TYPE: ICD-10-CM

## 2024-04-16 RX ORDER — ASPIRIN 81 MG/1
81 TABLET ORAL DAILY
Qty: 90 TABLET | Refills: 1 | Status: SHIPPED | OUTPATIENT
Start: 2024-04-16 | End: 2024-10-02

## 2024-05-24 ENCOUNTER — OFFICE VISIT (OUTPATIENT)
Dept: FAMILY MEDICINE | Facility: CLINIC | Age: 59
End: 2024-05-24
Payer: COMMERCIAL

## 2024-05-24 VITALS
HEIGHT: 71 IN | WEIGHT: 202.9 LBS | HEART RATE: 98 BPM | SYSTOLIC BLOOD PRESSURE: 113 MMHG | OXYGEN SATURATION: 99 % | BODY MASS INDEX: 28.4 KG/M2 | TEMPERATURE: 98 F | RESPIRATION RATE: 18 BRPM | DIASTOLIC BLOOD PRESSURE: 72 MMHG

## 2024-05-24 DIAGNOSIS — Z00.00 ROUTINE GENERAL MEDICAL EXAMINATION AT A HEALTH CARE FACILITY: Primary | ICD-10-CM

## 2024-05-24 DIAGNOSIS — Z12.11 SCREEN FOR COLON CANCER: ICD-10-CM

## 2024-05-24 DIAGNOSIS — E78.5 HYPERLIPIDEMIA LDL GOAL <70: ICD-10-CM

## 2024-05-24 DIAGNOSIS — Z23 ENCOUNTER FOR VACCINATION: ICD-10-CM

## 2024-05-24 DIAGNOSIS — I10 ESSENTIAL HYPERTENSION WITH GOAL BLOOD PRESSURE LESS THAN 140/90: ICD-10-CM

## 2024-05-24 DIAGNOSIS — Z71.6 ENCOUNTER FOR SMOKING CESSATION COUNSELING: ICD-10-CM

## 2024-05-24 DIAGNOSIS — E11.59 TYPE 2 DIABETES MELLITUS WITH OTHER CIRCULATORY COMPLICATION, WITH LONG-TERM CURRENT USE OF INSULIN (H): ICD-10-CM

## 2024-05-24 DIAGNOSIS — Z79.4 TYPE 2 DIABETES MELLITUS WITH OTHER CIRCULATORY COMPLICATION, WITH LONG-TERM CURRENT USE OF INSULIN (H): ICD-10-CM

## 2024-05-24 LAB — HBA1C MFR BLD: 7.8 % (ref 0–5.6)

## 2024-05-24 PROCEDURE — 83036 HEMOGLOBIN GLYCOSYLATED A1C: CPT

## 2024-05-24 PROCEDURE — 80048 BASIC METABOLIC PNL TOTAL CA: CPT

## 2024-05-24 PROCEDURE — 82570 ASSAY OF URINE CREATININE: CPT

## 2024-05-24 PROCEDURE — 99396 PREV VISIT EST AGE 40-64: CPT | Mod: GC

## 2024-05-24 PROCEDURE — 80061 LIPID PANEL: CPT

## 2024-05-24 PROCEDURE — 82043 UR ALBUMIN QUANTITATIVE: CPT

## 2024-05-24 PROCEDURE — 36415 COLL VENOUS BLD VENIPUNCTURE: CPT

## 2024-05-24 RX ORDER — ATORVASTATIN CALCIUM 80 MG/1
80 TABLET, FILM COATED ORAL DAILY
Qty: 90 TABLET | Refills: 1 | Status: SHIPPED | OUTPATIENT
Start: 2024-05-24 | End: 2024-10-07

## 2024-05-24 RX ORDER — INSULIN GLARGINE 100 [IU]/ML
INJECTION, SOLUTION SUBCUTANEOUS
Qty: 15 ML | Refills: 2 | Status: SHIPPED | OUTPATIENT
Start: 2024-05-24

## 2024-05-24 RX ORDER — INSULIN ASPART 100 [IU]/ML
INJECTION, SOLUTION INTRAVENOUS; SUBCUTANEOUS
Qty: 30 ML | Refills: 11 | Status: SHIPPED | OUTPATIENT
Start: 2024-05-24

## 2024-05-24 RX ORDER — METFORMIN HCL 500 MG
2000 TABLET, EXTENDED RELEASE 24 HR ORAL DAILY
Qty: 120 TABLET | Refills: 1 | Status: SHIPPED | OUTPATIENT
Start: 2024-05-24 | End: 2024-08-13

## 2024-05-24 RX ORDER — EXENATIDE 2 MG/.85ML
INJECTION, SUSPENSION, EXTENDED RELEASE SUBCUTANEOUS
Qty: 3.4 ML | Refills: 1 | Status: SHIPPED | OUTPATIENT
Start: 2024-05-24 | End: 2024-08-13

## 2024-05-24 SDOH — HEALTH STABILITY: PHYSICAL HEALTH: ON AVERAGE, HOW MANY DAYS PER WEEK DO YOU ENGAGE IN MODERATE TO STRENUOUS EXERCISE (LIKE A BRISK WALK)?: 4 DAYS

## 2024-05-24 SDOH — HEALTH STABILITY: PHYSICAL HEALTH: ON AVERAGE, HOW MANY MINUTES DO YOU ENGAGE IN EXERCISE AT THIS LEVEL?: 40 MIN

## 2024-05-24 ASSESSMENT — SOCIAL DETERMINANTS OF HEALTH (SDOH): HOW OFTEN DO YOU GET TOGETHER WITH FRIENDS OR RELATIVES?: PATIENT DECLINED

## 2024-05-24 NOTE — PROGRESS NOTES
Preceptor Attestation:    I discussed the patient with the resident and evaluated the patient in person. I have verified the content of the note, which accurately reflects my assessment of the patient and the plan of care.   Supervising Physician:  Kita Licona MD.

## 2024-05-24 NOTE — LETTER
May 25, 2024      Don Niño  [address]  [place] [place] [address]        Dear ,    We are writing to inform you of your test results.    Your Hemoglobin A1C improved to 7.8. Otherwise, your other labs remained stable or within normals limits. I recommend we continue your current medication regimen and follow up in 3 months for a diabetes follow up.     Resulted Orders   HEMOGLOBIN A1C   Result Value Ref Range    Hemoglobin A1C 7.8 (H) 0.0 - 5.6 %      Comment:      Normal <5.7%   Prediabetes 5.7-6.4%    Diabetes 6.5% or higher     Note: Adopted from ADA consensus guidelines.   Lipid panel reflex to direct LDL Fasting   Result Value Ref Range    Cholesterol 124 <200 mg/dL    Triglycerides 80 <150 mg/dL    Direct Measure HDL 40 >=40 mg/dL    LDL Cholesterol Calculated 68 <=100 mg/dL    Non HDL Cholesterol 84 <130 mg/dL    Patient Fasting > 8hrs? Unknown     Narrative    Cholesterol  Desirable:  <200 mg/dL    Triglycerides  Normal:  Less than 150 mg/dL  Borderline High:  150-199 mg/dL  High:  200-499 mg/dL  Very High:  Greater than or equal to 500 mg/dL    Direct Measure HDL  Female:  Greater than or equal to 50 mg/dL   Male:  Greater than or equal to 40 mg/dL    LDL Cholesterol  Desirable:  <100mg/dL  Above Desirable:  100-129 mg/dL   Borderline High:  130-159 mg/dL   High:  160-189 mg/dL   Very High:  >= 190 mg/dL    Non HDL Cholesterol  Desirable:  130 mg/dL  Above Desirable:  130-159 mg/dL  Borderline High:  160-189 mg/dL  High:  190-219 mg/dL  Very High:  Greater than or equal to 220 mg/dL   Basic metabolic panel   Result Value Ref Range    Sodium 139 135 - 145 mmol/L      Comment:      Reference intervals for this test were updated on 09/26/2023 to more accurately reflect our healthy population. There may be differences in the flagging of prior results with similar values performed with this method. Interpretation of those prior results can be made in the context of the updated reference  intervals.     Potassium 4.5 3.4 - 5.3 mmol/L    Chloride 107 98 - 107 mmol/L    Carbon Dioxide (CO2) 21 (L) 22 - 29 mmol/L    Anion Gap 11 7 - 15 mmol/L    Urea Nitrogen 11.8 6.0 - 20.0 mg/dL    Creatinine 0.81 0.67 - 1.17 mg/dL    GFR Estimate >90 >60 mL/min/1.73m2    Calcium 9.2 8.6 - 10.0 mg/dL    Glucose 162 (H) 70 - 99 mg/dL    Patient Fasting > 8hrs? Unknown    Albumin Random Urine Quantitative with Creat Ratio   Result Value Ref Range    Creatinine Urine mg/dL 198.0 mg/dL      Comment:      The reference ranges have not been established in urine creatinine. The results should be integrated into the clinical context for interpretation.    Albumin Urine mg/L 329.0 mg/L      Comment:      The reference ranges have not been established in urine albumin. The results should be integrated into the clinical context for interpretation.    Albumin Urine mg/g Cr 166.16 (H) 0.00 - 17.00 mg/g Cr      Comment:      Microalbuminuria is defined as an albumin:creatinine ratio of 17 to 299 for males and 25 to 299 for females. A ratio of albumin:creatinine of 300 or higher is indicative of overt proteinuria.  Due to biologic variability, positive results should be confirmed by a second, first-morning random or 24-hour timed urine specimen. If there is discrepancy, a third specimen is recommended. When 2 out of 3 results are in the microalbuminuria range, this is evidence for incipient nephropathy and warrants increased efforts at glucose control, blood pressure control, and institution of therapy with an angiotensin-converting-enzyme (ACE) inhibitor (if the patient can tolerate it).         If you have any questions or concerns, please call the clinic at the number listed above.       Sincerely,            Stephane Lopez MD

## 2024-05-24 NOTE — PROGRESS NOTES
"Preventive Care Visit  [facility], Family Medicine  May 24, 2024      Assessment & Plan     Routine general medical examination at a health care facility  Patient presents to clinic for a routine physical examination and to discuss diabetes.     Type 2 diabetes mellitus with other circulatory complication, with long-term current use of insulin (H)  Last A1c at 8.6 likely 2/2 to medication running out prior to last visit. Reports home blood glucose to be around 130-135's. Patient understand plan of actions if he develops symptoms of  hypoglycemia and hyperglycemia. He reports has has been taking his medication as written and has not miss any dose. Will obtain labs today. Medication refills provided. Follow up in 3 months for diabetes check.   - HEMOGLOBIN A1C  - Basic metabolic panel  - [facility]  - insulin glargine (LANTUS SOLOSTAR) 100 UNIT/ML pen  Dispense: 15 mL; Refill: 2  - metFORMIN (GLUCOPHAGE XR) 500 MG 24 hr tablet  Dispense: 120 tablet; Refill: 1  - insulin aspart (NOVOLOG FLEXPEN) 100 UNIT/ML pen  Dispense: 30 mL; Refill: 11  - exenatide ER (BYDUREON BCISE) 2 MG/0.85ML auto-injector  Dispense: 3.4 mL; Refill: 1  - Albumin Random Urine Quantitative with Creat Ratio    Essential hypertension with goal blood pressure less than 140/90  BP at goal of 113/72. No refills needed for Lisinopril. No medication changes today.  - atorvastatin (LIPITOR) 80 MG tablet  Dispense: 90 tablet; Refill: 1    Hyperlipidemia LDL goal <70  Last LDL on 8/14/23 of 77. Will obtain labs today. Stable on Atorvastatin 80 mg daily. Refills given.   - Lipid panel reflex to direct LDL Fasting  - atorvastatin (LIPITOR) 80 MG tablet  Dispense: 90 tablet; Refill: 1    Encounter for smoking cessation counseling  Reports occasional cigarette use since 2019. States that he only use on \"special occasions\". Has tried nicotine gums and chantix in the past but discontinued due to acid " "reflux. Discuss nicotine patches but declines today. Declines any additional support at this time and would follow up if assistance is needed.     Screen for colon cancer  Discussed alternative options such as Cologuard with the patient today. Continues to decline. Will discuss again at future visits.     Encounter for vaccination  Declines Hep a, b, zoster, and covid vaccine today. Will discuss again at future visits.       Patient has been advised of split billing requirements and indicates understanding: Yes      Nicotine/Tobacco Cessation  He reports that he has been smoking cigarettes. He has never used smokeless tobacco.  Nicotine/Tobacco Cessation Plan  Information offered: Patient not interested at this time      BMI  Estimated body mass index is 28.3 kg/m  as calculated from the following:    Height as of this encounter: 1.803 m (5' 11\").    Weight as of this encounter: 92 kg (202 lb 14.4 oz).       Counseling  Appropriate preventive services were discussed with this patient, including applicable screening as appropriate for fall prevention, nutrition, physical activity, Tobacco-use cessation, weight loss and cognition.  Checklist reviewing preventive services available has been given to the patient.  Reviewed patient's diet, addressing concerns and/or questions.       Return in about 53 weeks (around 5/30/2025) for Annual Wellness Visit.    Katerin Ochoa is a 59 year old, presenting for the following:  Physical (Diabetes f/u)        5/24/2024    10:52 AM   Additional Questions   Roomed by Doua   Accompanied by Self         5/24/2024    10:52 AM   Patient Reported Additional Medications   Patient reports taking the following new medications n/a         5/24/2024    Information    services provided? Yes   Language Bangladeshi   Type of interpretation provided Face-to-face    [facility]    [facility] Directive  Patient does not have a " Health Care Directive or Living Will: Discussed advance care planning with patient; however, patient declined at this time.    HPI    Annual wellness exam:  Doing well overall. Would like to discuss diabetes today.     Preventative screenings:    Colon cancer screening   - Not interested     Lung cancer screening   - Declined by patient  - Still smoking occasionally on special family occasions   - Has tried Chantix and nicotine gum in that past but discontinue due to acid reflux      Declines vaccination: Hep a, b, zoster, covid       Diabetes:   - Home BS: before meal 140, after meal 220, and mostly 130-135  - Hats home cook meal.   - Check BS about 3 times a day   - Last A1c 8/14/23: 8.6, ran out of medication prior to last visit   - Would say highest BS levels last couple of months was ~300  - Lowest BS last couple of months ~ 80's, symptom of sweating, would  drink juice and symptoms improve   - No recent hospitalization   - No recent eye exam    Current diabetes regime   - exenatide ER injection one a week   - insulin aspart 14 units before meal TID  - insulin glargine 30 unit(s) bed  - metFORMIN 500 mg 4 Tabs daily           5/24/2024   General Health   How would you rate your overall physical health? Good   Feel stress (tense, anxious, or unable to sleep) Not at all         5/24/2024   Nutrition   Three or more servings of calcium each day? Yes   Diet: Regular (no restrictions)   How many servings of fruit and vegetables per day? (!) 2-3   How many sweetened beverages each day? 0-1         5/24/2024   Exercise   Days per week of moderate/strenous exercise 4 days   Average minutes spent exercising at this level 40 min         5/24/2024   Social Factors   Frequency of gathering with friends or relatives Patient declined   Worry food won't last until get money to buy more No   Food not last or not have enough money for food? No   Do you have housing?  Yes   Are you worried about losing your housing? No   Lack of  "transportation? No   Unable to get utilities (heat,electricity)? No         2024   Fall Risk   Fallen 2 or more times in the past year? No   Trouble with walking or balance? No          2024   Dental   Dentist two times every year? Yes         2024   TB Screening   Were you born outside of the [place]? Yes         Today's PHQ-2 Score:       2024    11:01 AM   PHQ-2 (  [employer])   Q1: Little interest or pleasure in doing things 0   Q2: Feeling down, depressed or hopeless 0   PHQ-2 Score 0   Q1: Little interest or pleasure in doing things Not at all   Q2: Feeling down, depressed or hopeless Not at all   PHQ-2 Score 0           2024   Substance Use   Alcohol more than 3/day or more than 7/wk No   Do you use any other substances recreationally? No     Social History     Tobacco Use    Smoking status: Some Days     Current packs/day: 0.00     Types: Cigarettes     Last attempt to quit: 2019     Years since quittin.3    Smokeless tobacco: Never   Vaping Use    Vaping status: Never Used   Substance Use Topics    Alcohol use: No    Drug use: No           2024   STI Screening   New sexual partner(s) since last STI/HIV test? No   Last PSA: No results found for: \"PSA\"  ASCVD Risk   The 10-year ASCVD risk score (Destiney WHARTON, et al., 2019) is: 28%    Values used to calculate the score:      Age: 59 years      Sex: Male      Is Non- : Yes      Diabetic: Yes      Tobacco smoker: Yes      Systolic Blood Pressure: 113 mmHg      Is BP treated: Yes      HDL Cholesterol: 39 mg/dL      Total Cholesterol: 134 mg/dL      Reviewed and updated as needed this visit by Provider   Tobacco    Problems  Med Hx  Surg Hx  Fam Hx  Soc Hx Sexual Activity            Past Medical History:   Diagnosis Date    CAD (coronary artery disease)     MI    Diabetes mellitus (H)     Hyperlipidaemia     Hypertension      History reviewed. No pertinent surgical history.      Review of " "Systems  Constitutional, HEENT, cardiovascular, pulmonary, gi and gu systems are negative, except as otherwise noted.     Objective    Exam  /72 (BP Location: Left arm, Patient Position: Sitting, Cuff Size: Adult Large)   Pulse 98   Temp 98  F (36.7  C) (Oral)   Resp 18   Ht 1.803 m (5' 11\")   Wt 92 kg (202 lb 14.4 oz)   SpO2 99%   BMI 28.30 kg/m     Estimated body mass index is 28.3 kg/m  as calculated from the following:    Height as of this encounter: 1.803 m (5' 11\").    Weight as of this encounter: 92 kg (202 lb 14.4 oz).    Physical Exam  GENERAL: alert and no distress  EYES: Eyes grossly normal to inspection, PERRL and conjunctivae and sclerae normal  HENT: ear canals and TM's normal, nose and mouth without ulcers or lesions  NECK: no adenopathy, no asymmetry, masses, or scars  RESP: lungs clear to auscultation - no rales, rhonchi or wheezes  CV: regular rate and rhythm, normal S1 S2, no S3 or S4, no murmur, click or rub, no peripheral edema  ABDOMEN: soft, nontender, no hepatosplenomegaly, no masses and bowel sounds normal  MS: no gross musculoskeletal defects noted, no edema  SKIN: no suspicious lesions or rashes  NEURO: Normal strength and tone, mentation intact and speech normal  PSYCH: mentation appears normal, affect normal/bright  Diabetic foot exam: normal DP and PT pulses, no trophic changes or ulcerative lesions, and normal sensory exam        Signed Electronically by: Stephane Lopez MD    "

## 2024-05-24 NOTE — PATIENT INSTRUCTIONS
Discharge Summary - Gina Olmedo 29 y o  female MRN: [id number]    Unit/Bed#: -01 Encounter: [id number]    Admission Date: 2018     Discharge Date: 2018    Admitting Diagnosis:   1  Pregnancy at 38w6d  2  PROM    Discharge Diagnosis: same, delivered    Procedures: spontaneous vaginal delivery and repair of 1st degree laceration    Attending: Kinjal Bejarano MD    Hospital Course: Gina Olmedo is a 29 y  o  at 38w6d admitted for PROM at 2300 on 18  She was started on pitocin at 0244  She received an epidural for anesthesia  She progressed to complete at 1340 and pushing at 1354  She delivered a viable female  on 18 at 1400  Weight 6lbs 0 8oz via spontaneous vaginal delivery  Apgars were 9 (1 min) and 9 (5 min)  Patient tolerated the procedure well and was transferred to recovery in stable condition  Her postpartum course was complicated by nothing  Her postpartum pain was well controlled with oral analgesics  On day of discharge, she was ambulating and able to reasonably perform all ADLs  She was voiding and had appropriate bowel function  Pain was well controlled  She was discharged home on postpartum day #1 without complications  Patient was instructed to follow up with her OB as an outpatient and was given appropriate warnings to call provider if she develops signs of infection or uncontrolled pain  Complications: none apparent    Condition at discharge: good     Discharge instructions/Information to patient and family:   See after visit summary for information provided to patient and family  Provisions for Follow-Up Care:  See after visit summary for information related to follow-up care and any pertinent home health orders  Disposition: Home    Planned Readmission: No        Case and note reviewed agree  Patient Education   Here is the plan from today's visit    1. Routine general medical examination at a health care facility    2. Type 2 diabetes mellitus with other circulatory complication, with long-term current use of insulin (H)  Labs and medication refills provided  - HEMOGLOBIN A1C; Future  - Basic metabolic panel; Future  - Adult Eye  Referral; Future  - insulin glargine (LANTUS SOLOSTAR) 100 UNIT/ML pen; INJECT 30 UNITS UNDER THE SKIN AT BEDTIME.  Dispense: 15 mL; Refill: 2  - metFORMIN (GLUCOPHAGE XR) 500 MG 24 hr tablet; Take 4 tablets (2,000 mg) by mouth daily  Dispense: 120 tablet; Refill: 1  - insulin aspart (NOVOLOG FLEXPEN) 100 UNIT/ML pen; 14 units before meals three times a day  Dispense: 30 mL; Refill: 11  - exenatide ER (BYDUREON BCISE) 2 MG/0.85ML auto-injector; INJECT 2MG UNDER THE SKIN EVERY 7 DAYS AS DIRECTED  Dispense: 3.4 mL; Refill: 1  - Albumin Random Urine Quantitative with Creat Ratio; Future    3. Essential hypertension with goal blood pressure less than 140/90  Continue current medication regimen  - atorvastatin (LIPITOR) 80 MG tablet; Take 1 tablet (80 mg) by mouth daily  Dispense: 90 tablet; Refill: 1    4. Hyperlipidemia LDL goal <70  Labs and medication refill given  - Lipid panel reflex to direct LDL Fasting; Future  - atorvastatin (LIPITOR) 80 MG tablet; Take 1 tablet (80 mg) by mouth daily  Dispense: 90 tablet; Refill: 1    5. Encounter for smoking cessation counseling  Occasional use. Please feel free to let us know if additional assistance is needed     6. Screen for colon cancer  Declined today.     7. Encounter for vaccination  Declined today.         Please call or return to clinic if your symptoms don't go away.    Follow up plan  Return in about 53 weeks (around 5/30/2025) for Annual Wellness Visit.    Thank you for coming to [facility] today.  Lab Testing:  **If you had lab testing today and your results are reassuring or normal they will be mailed to you  or sent through [employer] within 7 days.   **If the lab tests need quick action we will call you with the results.  **If you are having labs done on a different day, please call [phone] to schedule at [facility] or [phone] for other [facility] locations. Labs do not offer walk-in appointments.  The phone number we will call with results is # [phone] (home) [phone] (work). If this is not the best number please call our clinic and change the number.  Medication Refills:  If you need any refills please call your pharmacy and they will contact us.   If you need to  your refill at a new pharmacy, please contact the new pharmacy directly. The new pharmacy will help you get your medications transferred faster.   Scheduling:  If you have any concerns about today's visit or wish to schedule another appointment please call our office during normal business hours [phone] (8-5:00 M-F). If you can no longer make a scheduled visit, please cancel via [employer] or call us to cancel.   If a referral was made to an [facility] specialty provider and you do not get a call from central scheduling, please refer to directions on your visit summary or call our office during normal business hours for assistance.   If a Mammogram was ordered for you at the Breast Center call [phone] to schedule or change your appointment.  If you had an XRay/CT/Ultrasound/MRI ordered the number is [phone] to schedule or change your radiology appointment.   [facility] has limited ultrasound appointments available on Wednesdays, if you would like your ultrasound at [facility], please call [phone] to schedule.   Medical Concerns:  If you have urgent medical concerns please call [phone] at any time of the day.    Stephane Lopez MD        Preventive Care Advice   This is general advice we often give to help people stay healthy. Your care team may have specific advice just for  "you. Please talk to your care team about your own preventive care needs.  Lifestyle  Exercise at least 150 minutes each week (30 minutes a day, 5 days a week).  Do muscle strengthening activities 2 days a week. These help control your weight and prevent disease.  No smoking.  Wear sunscreen to prevent skin cancer.  Have your home tested for radon every 2 to 5 years. Radon is a colorless, odorless gas that can harm your lungs. To learn more, go to www.health.[facility].[place]. and search for \"Radon in Homes.\"  Keep guns unloaded and locked up in a safe place like a safe or gun vault, or, use a gun lock and hide the keys. Always lock away bullets separately. To learn more, visit [employer].[place].gov and search for \"safe gun storage.\"  Nutrition  Eat 5 or more servings of fruits and vegetables each day.  Try wheat bread, brown rice and whole grain pasta (instead of white bread, rice, and pasta).  Get enough calcium and vitamin D. Check the label on foods and aim for 100% of the RDA (recommended daily allowance).  Regular exams  Have a dental exam and cleaning every 6 months.  See your health care team every year to talk about:  Any changes in your health.  Any medicines your care team has prescribed.  Preventive care, family planning, and ways to prevent chronic diseases.  Shots (vaccines)   HPV shots (up to age 26), if you've never had them before.  Hepatitis B shots (up to age 59), if you've never had them before.  COVID-19 shot: Get this shot when it's due.  Flu shot: Get a flu shot every year.  Tetanus shot: Get a tetanus shot every 10 years.  Pneumococcal, hepatitis A, and RSV shots: Ask your care team if you need these based on your risk.  Shingles shot (for age 50 and up).  General health tests  Diabetes screening:  Starting at age 35, Get screened for diabetes at least every 3 years.  If you are younger than age 35, ask your care team if you should be screened for diabetes.  Cholesterol test: At age 39, start having a " cholesterol test every 5 years, or more often if advised.  Bone density scan (DEXA): At age 50, ask your care team if you should have this scan for osteoporosis (brittle bones).  Hepatitis C: Get tested at least once in your life.  Abdominal aortic aneurysm screening: Talk to your doctor about having this screening if you:  Have ever smoked; and  Are biologically male; and  Are between the ages of 65 and 75.  STIs (sexually transmitted infections)  Before age 24: Ask your care team if you should be screened for STIs.  After age 24: Get screened for STIs if you're at risk. You are at risk for STIs (including HIV) if:  You are sexually active with more than one person.  You don't use condoms every time.  You or a partner was diagnosed with a sexually transmitted infection.  If you are at risk for HIV, ask about PrEP medicine to prevent HIV.  Get tested for HIV at least once in your life, whether you are at risk for HIV or not.  Cancer screening tests  Cervical cancer screening: If you have a cervix, begin getting regular cervical cancer screening tests at age 21. Most people who have regular screenings with normal results can stop after age 65. Talk about this with your provider.  Breast cancer scan (mammogram): If you've ever had breasts, begin having regular mammograms starting at age 40. This is a scan to check for breast cancer.  Colon cancer screening: It is important to start screening for colon cancer at age 45.  Have a colonoscopy test every 10 years (or more often if you're at risk) Or, ask your provider about stool tests like a FIT test every year or Cologuard test every 3 years.  To learn more about your testing options, visit: www.[employer]878173.pdf.  For help making a decision, visit: danielle/al55441.  Prostate cancer screening test: If you have a prostate and are age 55 to 69, ask your provider if you would benefit from a yearly prostate cancer screening test.  Lung cancer screening: If you are a  current or former smoker age 50 to 80, ask your care team if ongoing lung cancer screenings are right for you.  For informational purposes only. Not to replace the advice of your health care provider. Copyright   2023 [employer]. All rights reserved. Clinically reviewed by the [facility] Transitions Program. [employer] 516803 - REV 04/24.

## 2024-05-25 LAB
ANION GAP SERPL CALCULATED.3IONS-SCNC: 11 MMOL/L (ref 7–15)
BUN SERPL-MCNC: 11.8 MG/DL (ref 6–20)
CALCIUM SERPL-MCNC: 9.2 MG/DL (ref 8.6–10)
CHLORIDE SERPL-SCNC: 107 MMOL/L (ref 98–107)
CHOLEST SERPL-MCNC: 124 MG/DL
CREAT SERPL-MCNC: 0.81 MG/DL (ref 0.67–1.17)
CREAT UR-MCNC: 198 MG/DL
DEPRECATED HCO3 PLAS-SCNC: 21 MMOL/L (ref 22–29)
EGFRCR SERPLBLD CKD-EPI 2021: >90 ML/MIN/1.73M2
FASTING STATUS PATIENT QL REPORTED: ABNORMAL
FASTING STATUS PATIENT QL REPORTED: NORMAL
GLUCOSE SERPL-MCNC: 162 MG/DL (ref 70–99)
HDLC SERPL-MCNC: 40 MG/DL
LDLC SERPL CALC-MCNC: 68 MG/DL
MICROALBUMIN UR-MCNC: 329 MG/L
MICROALBUMIN/CREAT UR: 166.16 MG/G CR (ref 0–17)
NONHDLC SERPL-MCNC: 84 MG/DL
POTASSIUM SERPL-SCNC: 4.5 MMOL/L (ref 3.4–5.3)
SODIUM SERPL-SCNC: 139 MMOL/L (ref 135–145)
TRIGL SERPL-MCNC: 80 MG/DL

## 2024-06-21 DIAGNOSIS — I10 ESSENTIAL HYPERTENSION WITH GOAL BLOOD PRESSURE LESS THAN 140/90: ICD-10-CM

## 2024-06-24 NOTE — TELEPHONE ENCOUNTER
"Request for medication refill:    lisinopril (ZESTRIL) 5 MG tablet     Providers if patient needs an appointment and you are willing to give a one month supply please refill for one month and  send a letter/MyChart using \".SMILLIMITEDREFILL\" .smillimited and route chart to \"P [facility] \" (Giving one month refill in non controlled medications is strongly recommended before denial)    If refill has been denied, meaning absolutely no refills without visit, please complete the smart phrase \".smirxrefuse\" and route it to the \"P [facility] MED REFILLS\"  pool to inform the patient and the pharmacy.    Renee Villalobos MA      "

## 2024-06-26 RX ORDER — LISINOPRIL 5 MG/1
5 TABLET ORAL DAILY
Qty: 30 TABLET | Refills: 2 | Status: SHIPPED | OUTPATIENT
Start: 2024-06-26

## 2024-08-13 DIAGNOSIS — E11.59 TYPE 2 DIABETES MELLITUS WITH OTHER CIRCULATORY COMPLICATION, WITH LONG-TERM CURRENT USE OF INSULIN (H): ICD-10-CM

## 2024-08-13 DIAGNOSIS — Z79.4 TYPE 2 DIABETES MELLITUS WITH OTHER CIRCULATORY COMPLICATION, WITH LONG-TERM CURRENT USE OF INSULIN (H): ICD-10-CM

## 2024-08-13 RX ORDER — METFORMIN HCL 500 MG
2000 TABLET, EXTENDED RELEASE 24 HR ORAL DAILY
Qty: 120 TABLET | Refills: 1 | Status: SHIPPED | OUTPATIENT
Start: 2024-08-13 | End: 2024-09-27

## 2024-08-13 RX ORDER — EXENATIDE 2 MG/.85ML
INJECTION, SUSPENSION, EXTENDED RELEASE SUBCUTANEOUS
Qty: 3.4 ML | Refills: 1 | Status: SHIPPED | OUTPATIENT
Start: 2024-08-13

## 2024-08-13 NOTE — TELEPHONE ENCOUNTER
"Request for medication refill:    exenatide ER (BYDUREON BCISE) 2 MG/0.85ML auto-injector     metFORMIN (GLUCOPHAGE XR) 500 MG 24 hr tablet   Providers if patient needs an appointment and you are willing to give a one month supply please refill for one month and  send a letter/MyChart using \".SMILLIMITEDREFILL\" .smillimited and route chart to \"P [facility] \" (Giving one month refill in non controlled medications is strongly recommended before denial)    If refill has been denied, meaning absolutely no refills without visit, please complete the smart phrase \".smirxrefuse\" and route it to the \"P [facility] MED REFILLS\"  pool to inform the patient and the pharmacy.    Gisele Ward MA     "

## 2024-09-12 ENCOUNTER — TELEPHONE (OUTPATIENT)
Dept: FAMILY MEDICINE | Facility: CLINIC | Age: 59
End: 2024-09-12
Payer: COMMERCIAL

## 2024-09-12 NOTE — LETTER
September 12, 2024    To  Don Niño  [address]  [place] [address]    Your team at [facility] cares about your health. We have reviewed your chart and based on our findings; we are making the following recommendations to better manage your health.     You are in particular need of attention regarding the following:     Schedule a DIABETIC FOLLOW UP appointment for Office Visit. Patients with diabetes should see their provider regularly.    If you have already completed these items, please contact the clinic via phone or   [employer] so your care team can review and update your records. Thank you for   choosing [facility] for your healthcare needs. For any questions,   concerns, or to schedule an appointment please contact our clinic at [phone]    Healthy Regards,      Your [facility] Care Team

## 2024-09-12 NOTE — TELEPHONE ENCOUNTER
Patient Quality Outreach    Patient is due for the following:   Diabetes -  A1C and Diabetic Follow-Up Visit    Next Steps:   Schedule a office visit for Diabetes    Type of outreach:    Sent letter.      Questions for provider review:    None           Adrianna Vásquez

## 2024-09-27 ENCOUNTER — TELEPHONE (OUTPATIENT)
Dept: FAMILY MEDICINE | Facility: CLINIC | Age: 59
End: 2024-09-27
Payer: COMMERCIAL

## 2024-09-27 DIAGNOSIS — Z79.4 TYPE 2 DIABETES MELLITUS WITH OTHER CIRCULATORY COMPLICATION, WITH LONG-TERM CURRENT USE OF INSULIN (H): Primary | ICD-10-CM

## 2024-09-27 DIAGNOSIS — E11.59 TYPE 2 DIABETES MELLITUS WITH OTHER CIRCULATORY COMPLICATION, WITH LONG-TERM CURRENT USE OF INSULIN (H): Primary | ICD-10-CM

## 2024-09-27 RX ORDER — METFORMIN HCL 500 MG
2000 TABLET, EXTENDED RELEASE 24 HR ORAL DAILY
Qty: 120 TABLET | Refills: 1 | Status: SHIPPED | OUTPATIENT
Start: 2024-09-27 | End: 2024-09-28

## 2024-09-28 ENCOUNTER — TELEPHONE (OUTPATIENT)
Dept: FAMILY MEDICINE | Facility: CLINIC | Age: 59
End: 2024-09-28
Payer: COMMERCIAL

## 2024-09-28 RX ORDER — METFORMIN HCL 500 MG
2000 TABLET, EXTENDED RELEASE 24 HR ORAL DAILY
Qty: 120 TABLET | Refills: 1 | Status: SHIPPED | OUTPATIENT
Start: 2024-09-28 | End: 2024-09-29

## 2024-09-28 NOTE — TELEPHONE ENCOUNTER
Telephone Message   9/27/2024  10:14 PM    Call initiated by Anthony Michele MD    Patient: Don Niño   Phone number-  [phone] (home) [phone] (work)      return their call  Phone conversation with: Patient    Situation: Don Niño  Is a 59 year old  male This call is regarding his prescription of metformin 500 mg QID.   Background : Our pharmacy is closed this weekend, and he requests this prescription is sent to a nearby pharmacy open over the weekend.   Assessment: Reasonable to refill a prescription to his requested pharmacy.   Recommendation/Plan  -Have the patient  his prescription at [facility] at [address] [place] [address].   Advised caller to Patient understands the suggested plan and agrees to follow though.       Telephone Message   9/28/2024  6:23 PM    Paged about the prescription was not available at above pharmacy. Patient reports going there and was told it was placed in another persons name. Unclear how this could have occurred. Will send to alternative 24 hr pharmacy -- [facility] [address], and provided phone number.    Statement Selected

## 2024-09-29 ENCOUNTER — TELEPHONE (OUTPATIENT)
Dept: FAMILY MEDICINE | Facility: CLINIC | Age: 59
End: 2024-09-29
Payer: COMMERCIAL

## 2024-09-29 DIAGNOSIS — E11.59 TYPE 2 DIABETES MELLITUS WITH OTHER CIRCULATORY COMPLICATION, WITH LONG-TERM CURRENT USE OF INSULIN (H): ICD-10-CM

## 2024-09-29 DIAGNOSIS — Z79.4 TYPE 2 DIABETES MELLITUS WITH OTHER CIRCULATORY COMPLICATION, WITH LONG-TERM CURRENT USE OF INSULIN (H): ICD-10-CM

## 2024-09-29 RX ORDER — METFORMIN HCL 500 MG
2000 TABLET, EXTENDED RELEASE 24 HR ORAL DAILY
Qty: 120 TABLET | Refills: 0 | Status: SHIPPED | OUTPATIENT
Start: 2024-09-29 | End: 2024-09-30

## 2024-09-29 NOTE — TELEPHONE ENCOUNTER
Telephone Message     9/29/2024  3:36 PM    Call returned by Stephane Lopez MD    Patient: Don Niño   Phone number-  [phone] (home) [phone] (work)      return their call  Phone conversation with: Patient    Situation: Don Niño  Is a 59 year old  male This call is regarding  Metformin refills.   Background : Patient has been out of metformin since Friday. Reports to have had refills at his routine pharmacy but they are close over the weekend. Request a refill to be sent to another pharmacy.   Assessment: Out of metformin since Friday due to pharmacy being closed.   Recommendation/Plan: One month refill send to [facility] per patient request.

## 2024-09-30 ENCOUNTER — TELEPHONE (OUTPATIENT)
Dept: FAMILY MEDICINE | Facility: CLINIC | Age: 59
End: 2024-09-30
Payer: COMMERCIAL

## 2024-09-30 DIAGNOSIS — E78.5 HYPERLIPIDEMIA LDL GOAL <70: ICD-10-CM

## 2024-09-30 DIAGNOSIS — Z79.4 TYPE 2 DIABETES MELLITUS WITH OTHER CIRCULATORY COMPLICATION, WITH LONG-TERM CURRENT USE OF INSULIN (H): ICD-10-CM

## 2024-09-30 DIAGNOSIS — I10 ESSENTIAL HYPERTENSION WITH GOAL BLOOD PRESSURE LESS THAN 140/90: ICD-10-CM

## 2024-09-30 DIAGNOSIS — E11.59 TYPE 2 DIABETES MELLITUS WITH OTHER CIRCULATORY COMPLICATION, WITH LONG-TERM CURRENT USE OF INSULIN (H): ICD-10-CM

## 2024-09-30 RX ORDER — METFORMIN HCL 500 MG
2000 TABLET, EXTENDED RELEASE 24 HR ORAL DAILY
Qty: 120 TABLET | Refills: 0 | Status: SHIPPED | OUTPATIENT
Start: 2024-09-30

## 2024-09-30 NOTE — TELEPHONE ENCOUNTER
Patient requesting metformin be sent to [facility]. Transferred per RN standing order.   Annie Bishop RN

## 2024-09-30 NOTE — TELEPHONE ENCOUNTER
[facility] phone call message- patient requesting a refill:    Full Medication Name: metFORMIN (GLUCOPHAGE XR)     Dose: 500 mg    Pharmacy confirmed as   [facility] [place], [place] - [address]  [facility] [place] [address]  Phone: [phone] Fax: [phone]    : Yes    Additional Comments: pt has been out of meds for 10 days; called to get a refill on 9/27 [facility] Pharmacy denied him bc pt is an adult.     OK to leave a message on voice mail? Yes    Primary language: Mosotho      needed? Yes    Call taken on September 30, 2024 at 4:26 PM by Mandy Ying

## 2024-10-01 NOTE — TELEPHONE ENCOUNTER
"Request for medication refill:  atorvastatin (LIPITOR) 80 MG tablet     Providers if patient needs an appointment and you are willing to give a one month supply please refill for one month and  send a letter/MyChart using \".SMILLIMITEDREFILL\" .smillimited and route chart to \"P [facility] \" (Giving one month refill in non controlled medications is strongly recommended before denial)    If refill has been denied, meaning absolutely no refills without visit, please complete the smart phrase \".smirxrefuse\" and route it to the \"P [facility] MED REFILLS\"  pool to inform the patient and the pharmacy.    Arlette Elizabeth, CMA      "

## 2024-10-02 DIAGNOSIS — I25.10 CORONARY ARTERY DISEASE INVOLVING NATIVE HEART WITHOUT ANGINA PECTORIS, UNSPECIFIED VESSEL OR LESION TYPE: ICD-10-CM

## 2024-10-04 RX ORDER — ASPIRIN 81 MG/1
81 TABLET ORAL DAILY
Qty: 90 TABLET | Refills: 1 | Status: SHIPPED | OUTPATIENT
Start: 2024-10-04

## 2024-10-07 RX ORDER — ATORVASTATIN CALCIUM 80 MG/1
80 TABLET, FILM COATED ORAL DAILY
Qty: 90 TABLET | Refills: 1 | Status: SHIPPED | OUTPATIENT
Start: 2024-10-07

## 2024-10-21 ENCOUNTER — APPOINTMENT (OUTPATIENT)
Dept: INTERPRETER SERVICES | Facility: CLINIC | Age: 59
End: 2024-10-21
Payer: COMMERCIAL

## 2024-10-21 DIAGNOSIS — Z79.4 TYPE 2 DIABETES MELLITUS WITH OTHER CIRCULATORY COMPLICATION, WITH LONG-TERM CURRENT USE OF INSULIN (H): ICD-10-CM

## 2024-10-21 DIAGNOSIS — E11.59 TYPE 2 DIABETES MELLITUS WITH OTHER CIRCULATORY COMPLICATION, WITH LONG-TERM CURRENT USE OF INSULIN (H): ICD-10-CM

## 2024-10-21 RX ORDER — METFORMIN HYDROCHLORIDE 500 MG/1
2000 TABLET, EXTENDED RELEASE ORAL DAILY
Qty: 120 TABLET | Refills: 0 | Status: SHIPPED | OUTPATIENT
Start: 2024-10-21

## 2024-10-21 NOTE — TELEPHONE ENCOUNTER
Medication refills given. Please schedule a diabetes follow up appointment.     Stephane Lopez MD

## 2024-12-11 ENCOUNTER — TELEPHONE (OUTPATIENT)
Dept: FAMILY MEDICINE | Facility: CLINIC | Age: 59
End: 2024-12-11
Payer: COMMERCIAL

## 2024-12-11 DIAGNOSIS — E11.59 TYPE 2 DIABETES MELLITUS WITH OTHER CIRCULATORY COMPLICATION, WITH LONG-TERM CURRENT USE OF INSULIN (H): ICD-10-CM

## 2024-12-11 DIAGNOSIS — Z79.4 TYPE 2 DIABETES MELLITUS WITH OTHER CIRCULATORY COMPLICATION, WITH LONG-TERM CURRENT USE OF INSULIN (H): ICD-10-CM

## 2024-12-11 RX ORDER — METFORMIN HYDROCHLORIDE 500 MG/1
2000 TABLET, EXTENDED RELEASE ORAL DAILY
Qty: 120 TABLET | Refills: 1 | Status: SHIPPED | OUTPATIENT
Start: 2024-12-11

## 2024-12-11 NOTE — TELEPHONE ENCOUNTER
Patient Quality Outreach    Patient is due for the following:   Diabetes -  A1C, Eye Exam, and Diabetic Follow-Up Visit  Colon Cancer Screening      Topic Date Due    Hepatitis A Vaccine (1 of 2 - Risk 2-dose series) Never done    Hepatitis B Vaccine (1 of 3 - 19+ 3-dose series) Never done    Zoster (Shingles) Vaccine (1 of 2) Never done    Flu Vaccine (1) 09/01/2024    COVID-19 Vaccine (3 - 2024-25 season) 09/01/2024    Diptheria Tetanus Pertussis (DTAP/TDAP/TD) Vaccine (2 - Td or Tdap) 12/12/2024       Action(s) Taken:   Schedule a office visit for diabetes, colorectal screening referral, vaccinations    Type of outreach:    Sent letter.    Questions for provider review:    None           Adrianna Vásquez, CMA

## 2024-12-11 NOTE — TELEPHONE ENCOUNTER
"Request for medication refill:    metFORMIN (GLUCOPHAGE XR) 500 MG 24 hr tablet       Providers if patient needs an appointment and you are willing to give a one month supply please refill for one month and  send a letter/MyChart using \".SMILLIMITEDREFILL\" .smillimited and route chart to \"P [facility] \" (Giving one month refill in non controlled medications is strongly recommended before denial)    If refill has been denied, meaning absolutely no refills without visit, please complete the smart phrase \".smirxrefuse\" and route it to the \"P [facility] MED REFILLS\"  pool to inform the patient and the pharmacy.    Adrianna Vásquez, CMA     "

## 2024-12-11 NOTE — TELEPHONE ENCOUNTER
Reason for call: Schedule appointment     Attempt to reach: 1st    Outcome:Left voicemail with     Detailed message left? Yes per  patient needs to schedule repeat diabetes follow up and labs.    Please return call to [facility]     Clinic phone number [phone]

## 2024-12-11 NOTE — TELEPHONE ENCOUNTER
Medication refills given. Please have patient schedule follow up appt for repeat diabetes labs.     Stephane Lopez MD

## 2024-12-11 NOTE — LETTER
12/11/2024         RE: Don Niño  [address]  [facility] [address]      December 11, 2024    To  Don Niño  [address]  [facility] [address]    Your team at [facility] cares about your health. We have reviewed your chart and based on our findings; we are making the following recommendations to better manage your health.     You are in particular need of attention regarding the following:     Schedule a DIABETIC FOLLOW UP appointment for Office Visit. Patients with diabetes should see their provider regularly.  Schedule a DIABETIC EYE EXAM.  This exam is done with an optometrist, annually. You can schedule this appointment with your eye doctor.  If you need a referral, please let us know.  Call or MyChart message your clinic to schedule a colonoscopy, schedule/ a FIT Test, or order a Cologuard test. If you are unsure what type of test you need, please call your clinic and speak to clinic staff.   Colon cancer is now the second leading cause of cancer-related deaths in the [place] for both men and women and there are over 130,000 new cases and 50,000 deaths per year from colon cancer. Colonoscopies can prevent 90-95% of these deaths. Problem lesions can be removed before they ever become cancer. This test is not only looking for cancer, but also getting rid of precancerous lesions.   If you are under/uninsured, we recommend you contact the [employer] Program.[employer] is a free colorectal cancer screening program that provides colonoscopies for eligible under/uninsured [place] men and women. If you are interested in receiving a free colonoscopy, please call [facility] at t [phone] (mention code [employer]) to see if you're eligible. Please have them send us the results.   Please call [phone] to schedule a colonoscopy.  Contact your clinic to schedule/ your FIT Test.   Schedule an office visit with your provider if you are  interested in completing your colon cancer screening with a Cologuard test  Please schedule a Nurse Only Appointment with your primary care clinic to update your immunizations that are due.    If you have already completed these items, please contact the clinic via phone or   [employer] so your care team can review and update your records. Thank you for   choosing [facility] for your healthcare needs. For any questions,   concerns, or to schedule an appointment please contact our clinic.    Healthy Regards,      Your [facility] Team         Stephane Lopez MD

## 2024-12-31 DIAGNOSIS — Z79.4 TYPE 2 DIABETES MELLITUS WITH OTHER CIRCULATORY COMPLICATION, WITH LONG-TERM CURRENT USE OF INSULIN (H): ICD-10-CM

## 2024-12-31 DIAGNOSIS — E11.59 TYPE 2 DIABETES MELLITUS WITH OTHER CIRCULATORY COMPLICATION, WITH LONG-TERM CURRENT USE OF INSULIN (H): ICD-10-CM

## 2024-12-31 RX ORDER — INSULIN GLARGINE 100 [IU]/ML
INJECTION, SOLUTION SUBCUTANEOUS
Qty: 15 ML | Refills: 2 | Status: SHIPPED | OUTPATIENT
Start: 2024-12-31

## 2024-12-31 NOTE — TELEPHONE ENCOUNTER
"Request for medication refill:  insulin glargine (LANTUS SOLOSTAR) 100 UNIT/ML pen     Providers if patient needs an appointment and you are willing to give a one month supply please refill for one month and  send a letter/MyChart using \".SMILLIMITEDREFILL\" .smillimited and route chart to \"P [facility] \" (Giving one month refill in non controlled medications is strongly recommended before denial)    If refill has been denied, meaning absolutely no refills without visit, please complete the smart phrase \".smirxrefuse\" and route it to the \"P [facility] MED REFILLS\"  pool to inform the patient and the pharmacy.    Arlette Elizabeth CMA      "

## 2025-02-14 ENCOUNTER — APPOINTMENT (OUTPATIENT)
Dept: CT IMAGING | Facility: CLINIC | Age: 60
End: 2025-02-14
Attending: EMERGENCY MEDICINE
Payer: COMMERCIAL

## 2025-02-14 ENCOUNTER — HOSPITAL ENCOUNTER (EMERGENCY)
Facility: CLINIC | Age: 60
Discharge: HOME | End: 2025-02-15
Attending: EMERGENCY MEDICINE | Admitting: EMERGENCY MEDICINE
Payer: COMMERCIAL

## 2025-02-14 VITALS
SYSTOLIC BLOOD PRESSURE: 137 MMHG | TEMPERATURE: 98.3 F | WEIGHT: 192 LBS | OXYGEN SATURATION: 99 % | DIASTOLIC BLOOD PRESSURE: 73 MMHG | BODY MASS INDEX: 26.88 KG/M2 | RESPIRATION RATE: 16 BRPM | HEART RATE: 105 BPM | HEIGHT: 71 IN

## 2025-02-14 DIAGNOSIS — R31.9 HEMATURIA, UNSPECIFIED TYPE: ICD-10-CM

## 2025-02-14 DIAGNOSIS — D64.9 ANEMIA, UNSPECIFIED TYPE: ICD-10-CM

## 2025-02-14 LAB
ALBUMIN UR-MCNC: 30 MG/DL
ANION GAP SERPL CALCULATED.3IONS-SCNC: 12 MMOL/L (ref 7–15)
APPEARANCE UR: CLEAR
BASOPHILS # BLD AUTO: 0.1 10E3/UL (ref 0–0.2)
BASOPHILS NFR BLD AUTO: 1 %
BILIRUB UR QL STRIP: NEGATIVE
BUN SERPL-MCNC: 17.3 MG/DL (ref 8–23)
CALCIUM SERPL-MCNC: 9.4 MG/DL (ref 8.8–10.4)
CHLORIDE SERPL-SCNC: 100 MMOL/L (ref 98–107)
COLOR UR AUTO: ABNORMAL
CREAT SERPL-MCNC: 1.03 MG/DL (ref 0.67–1.17)
EGFRCR SERPLBLD CKD-EPI 2021: 84 ML/MIN/1.73M2
EOSINOPHIL # BLD AUTO: 0.4 10E3/UL (ref 0–0.7)
EOSINOPHIL NFR BLD AUTO: 5 %
ERYTHROCYTE [DISTWIDTH] IN BLOOD BY AUTOMATED COUNT: 18.6 % (ref 10–15)
GLUCOSE SERPL-MCNC: 149 MG/DL (ref 70–99)
GLUCOSE UR STRIP-MCNC: 150 MG/DL
HCO3 SERPL-SCNC: 24 MMOL/L (ref 22–29)
HCT VFR BLD AUTO: 25.8 % (ref 40–53)
HGB BLD-MCNC: 7.4 G/DL (ref 13.3–17.7)
HGB UR QL STRIP: ABNORMAL
IMM GRANULOCYTES # BLD: 0 10E3/UL
IMM GRANULOCYTES NFR BLD: 0 %
KETONES UR STRIP-MCNC: NEGATIVE MG/DL
LEUKOCYTE ESTERASE UR QL STRIP: NEGATIVE
LYMPHOCYTES # BLD AUTO: 3.3 10E3/UL (ref 0.8–5.3)
LYMPHOCYTES NFR BLD AUTO: 42 %
MCH RBC QN AUTO: 19 PG (ref 26.5–33)
MCHC RBC AUTO-ENTMCNC: 28.7 G/DL (ref 31.5–36.5)
MCV RBC AUTO: 66 FL (ref 78–100)
MONOCYTES # BLD AUTO: 0.9 10E3/UL (ref 0–1.3)
MONOCYTES NFR BLD AUTO: 11 %
NEUTROPHILS # BLD AUTO: 3.3 10E3/UL (ref 1.6–8.3)
NEUTROPHILS NFR BLD AUTO: 42 %
NITRATE UR QL: NEGATIVE
NRBC # BLD AUTO: 0 10E3/UL
NRBC BLD AUTO-RTO: 0 /100
PH UR STRIP: 6.5 [PH] (ref 5–7)
PLATELET # BLD AUTO: 374 10E3/UL (ref 150–450)
POTASSIUM SERPL-SCNC: 4 MMOL/L (ref 3.4–5.3)
RBC # BLD AUTO: 3.9 10E6/UL (ref 4.4–5.9)
RBC URINE: 37 /HPF
SODIUM SERPL-SCNC: 136 MMOL/L (ref 135–145)
SP GR UR STRIP: 1.02 (ref 1–1.03)
SQUAMOUS EPITHELIAL: <1 /HPF
UROBILINOGEN UR STRIP-MCNC: NORMAL MG/DL
WBC # BLD AUTO: 8 10E3/UL (ref 4–11)
WBC URINE: 2 /HPF

## 2025-02-14 PROCEDURE — 81001 URINALYSIS AUTO W/SCOPE: CPT | Performed by: EMERGENCY MEDICINE

## 2025-02-14 PROCEDURE — 36415 COLL VENOUS BLD VENIPUNCTURE: CPT | Performed by: EMERGENCY MEDICINE

## 2025-02-14 PROCEDURE — 99284 EMERGENCY DEPT VISIT MOD MDM: CPT | Mod: 25 | Performed by: EMERGENCY MEDICINE

## 2025-02-14 PROCEDURE — 85025 COMPLETE CBC W/AUTO DIFF WBC: CPT | Performed by: EMERGENCY MEDICINE

## 2025-02-14 PROCEDURE — 99284 EMERGENCY DEPT VISIT MOD MDM: CPT | Performed by: EMERGENCY MEDICINE

## 2025-02-14 PROCEDURE — 82565 ASSAY OF CREATININE: CPT | Performed by: EMERGENCY MEDICINE

## 2025-02-14 PROCEDURE — 74176 CT ABD & PELVIS W/O CONTRAST: CPT

## 2025-02-14 ASSESSMENT — ACTIVITIES OF DAILY LIVING (ADL)
ADLS_ACUITY_SCORE: 41
ADLS_ACUITY_SCORE: 41

## 2025-02-14 ASSESSMENT — COLUMBIA-SUICIDE SEVERITY RATING SCALE - C-SSRS
6. HAVE YOU EVER DONE ANYTHING, STARTED TO DO ANYTHING, OR PREPARED TO DO ANYTHING TO END YOUR LIFE?: NO
1. IN THE PAST MONTH, HAVE YOU WISHED YOU WERE DEAD OR WISHED YOU COULD GO TO SLEEP AND NOT WAKE UP?: NO
2. HAVE YOU ACTUALLY HAD ANY THOUGHTS OF KILLING YOURSELF IN THE PAST MONTH?: NO

## 2025-02-15 RX ORDER — FERROUS SULFATE 325(65) MG
325 TABLET ORAL
Qty: 30 TABLET | Refills: 0 | Status: SHIPPED | OUTPATIENT
Start: 2025-02-15

## 2025-02-15 RX ORDER — FERROUS SULFATE 325(65) MG
325 TABLET ORAL
Qty: 30 TABLET | Refills: 0 | Status: SHIPPED | OUTPATIENT
Start: 2025-02-15 | End: 2025-02-15

## 2025-02-15 NOTE — ED PROVIDER NOTES
"    [facility] EMERGENCY DEPARTMENT ([facility])    2/14/25      ED PROVIDER NOTE    History     Chief Complaint   Patient presents with    Hematuria     Since Thursday has noted ' small blood in my urine and my back hurts\"     The history is provided by the patient and medical records.     Don Niño is a 60 year old male with a history notable for insulin-dependent T2DM, CAD, and HTN who presents to the ED with back pain and hematuria.  Patient reports developing back pain 3 to 4 days ago.  He then noticed blood with urination both yesterday and today.  He noticed the blood with the beginning of urination.  No difficulty urinating.  No pain with urination.  No history of kidney stones.  No changes to food or liquid intake.  No recent fevers.  No abdominal pain.  No back pain here in the ED.    Past Medical History  Past Medical History:   Diagnosis Date    CAD (coronary artery disease)     MI    Diabetes mellitus (H)     Hyperlipidaemia     Hypertension      No past surgical history on file.  Alcohol Swabs (ALCOHOL PREP) 70 % PADS  aspirin 81 MG EC tablet  atorvastatin (LIPITOR) 80 MG tablet  blood glucose (NO BRAND SPECIFIED) lancets standard  blood glucose (NO BRAND SPECIFIED) test strip  blood glucose (NO BRAND SPECIFIED) test strip  blood glucose (NO BRAND SPECIFIED) test strip  blood glucose monitoring (NO BRAND SPECIFIED) meter device kit  blood glucose monitoring (NO BRAND SPECIFIED) meter device kit  BYDUREON BCISE 2 MG/0.85ML auto-injector  BYDUREON BCISE 2 MG/0.85ML auto-injector  Cetaphil Moisturizing (CETAPHIL) external lotion  insulin aspart (NOVOLOG FLEXPEN) 100 UNIT/ML pen  insulin pen needle (B-D U/F) 31G X 8 MM miscellaneous  LANTUS SOLOSTAR 100 UNIT/ML soln  lisinopril (ZESTRIL) 5 MG tablet  loratadine (CLARITIN) 10 MG capsule  metFORMIN (GLUCOPHAGE XR) 500 MG 24 hr tablet  nicotine (NICODERM CQ) 21 MG/24HR 24 hr patch  nicotine (NICORETTE) 2 MG gum  nitroGLYcerin (NITROSTAT) 0.4 MG " "sublingual tablet  thin (NO BRAND SPECIFIED) lancets  varenicline (CHANTIX ANNELIESE) 0.5 MG X 11 & 1 MG X 42 tablet      Allergies   Allergen Reactions    Seasonal Allergies      Family History  Family History   Problem Relation Age of Onset    Cancer No family hx of     Coronary Artery Disease No family hx of      Social History   Social History     Tobacco Use    Smoking status: Some Days     Current packs/day: 0.00     Types: Cigarettes     Last attempt to quit: 2019     Years since quittin.1    Smokeless tobacco: Never   Vaping Use    Vaping status: Never Used   Substance Use Topics    Alcohol use: No    Drug use: No      Past medical history, past surgical history, medications, allergies, family history, and social history were reviewed with the patient. No additional pertinent items.     A medically appropriate review of systems was performed with pertinent positives and negatives noted in the HPI, and all other systems negative.    Physical Exam   BP: 137/73  Pulse: 105  Temp: 98.3  F (36.8  C)  Resp: 16  Height: 180.3 cm (5' 11\")  Weight: 87.1 kg (192 lb)  SpO2: 99 %    Physical Exam  Constitutional:       General: He is not in acute distress.     Appearance: He is well-developed. He is not ill-appearing or toxic-appearing.   HENT:      Head: Normocephalic and atraumatic.   Cardiovascular:      Rate and Rhythm: Normal rate and regular rhythm.      Heart sounds: Normal heart sounds.   Pulmonary:      Effort: Pulmonary effort is normal. No respiratory distress.      Breath sounds: No wheezing.   Abdominal:      General: There is no distension.      Palpations: Abdomen is soft. There is no mass.      Tenderness: There is no abdominal tenderness. There is no rebound.      Hernia: No hernia is present.      Comments: No pain in left flank to palpation   Musculoskeletal:      Cervical back: Normal range of motion and neck supple.   Skin:     General: Skin is warm.   Neurological:      Mental Status: He is alert " and oriented to person, place, and time.   Psychiatric:         Mood and Affect: Mood normal.         Behavior: Behavior normal.         Thought Content: Thought content normal.       ED Course, Procedures, & Data      Procedures             Results for orders placed or performed during the hospital encounter of 02/14/25   CT Abdomen Pelvis w/o Contrast     Status: None    Narrative    EXAM: CT ABDOMEN AND PELVIS WITHOUT CONTRAST - RENAL STONE PROTOCOL  LOCATION: [facility]  DATE/TIME: 02/14/2025 10:57 PM CST    INDICATION: Hematuria. Left flank pain.  COMPARISON: None.    TECHNIQUE: CT scan of the abdomen and pelvis was performed without oral or IV contrast. Multiplanar reformats were obtained. Dose reduction techniques were used.  CONTRAST: None.    FINDINGS:    LOWER CHEST: Coronary artery calcification.    HEPATOBILIARY: Unremarkable.    SPLEEN: Unremarkable.    PANCREAS: Unremarkable.    ADRENAL GLANDS: Unremarkable.    KIDNEYS/BLADDER: No visualized renal or ureteral calculi. No significant dilatation of bilateral intrarenal collecting systems or ureters. No visualized bladder calculi.    BOWEL: The stomach is mildly distended with food particles. The small and large bowel are normal in caliber. A moderate amount of stool is present in the colon. Normal appendix.    LYMPH NODES: Unremarkable.    PELVIC ORGANS: No acute findings.    MUSCULOSKELETAL: No acute findings.    OTHER: Atherosclerotic calcification in the abdominal aorta.      Impression    IMPRESSION:   1. No cause of acute pain identified in the abdomen or pelvis.  2. No renal or ureteral calculi or convincing evidence of urinary obstruction.    UA with Microscopic reflex to Culture     Status: Abnormal    Specimen: Urine, NOS   Result Value Ref Range    Color Urine Light Yellow Colorless, Straw, Light Yellow, Yellow    Appearance Urine Clear Clear    Glucose Urine 150 (A) Negative mg/dL    Bilirubin  Urine Negative Negative    Ketones Urine Negative Negative mg/dL    Specific Gravity Urine 1.017 1.003 - 1.035    Blood Urine Moderate (A) Negative    pH Urine 6.5 5.0 - 7.0    Protein Albumin Urine 30 (A) Negative mg/dL    Urobilinogen Urine Normal Normal, 2.0 mg/dL    Nitrite Urine Negative Negative    Leukocyte Esterase Urine Negative Negative    RBC Urine 37 (H) <=2 /HPF    WBC Urine 2 <=5 /HPF    Squamous Epithelials Urine <1 <=1 /HPF    Narrative    Urine Culture not indicated   Basic metabolic panel     Status: Abnormal   Result Value Ref Range    Sodium 136 135 - 145 mmol/L    Potassium 4.0 3.4 - 5.3 mmol/L    Chloride 100 98 - 107 mmol/L    Carbon Dioxide (CO2) 24 22 - 29 mmol/L    Anion Gap 12 7 - 15 mmol/L    Urea Nitrogen 17.3 8.0 - 23.0 mg/dL    Creatinine 1.03 0.67 - 1.17 mg/dL    GFR Estimate 84 >60 mL/min/1.73m2    Calcium 9.4 8.8 - 10.4 mg/dL    Glucose 149 (H) 70 - 99 mg/dL   CBC with platelets and differential     Status: Abnormal   Result Value Ref Range    WBC Count 8.0 4.0 - 11.0 10e3/uL    RBC Count 3.90 (L) 4.40 - 5.90 10e6/uL    Hemoglobin 7.4 (L) 13.3 - 17.7 g/dL    Hematocrit 25.8 (L) 40.0 - 53.0 %    MCV 66 (L) 78 - 100 fL    MCH 19.0 (L) 26.5 - 33.0 pg    MCHC 28.7 (L) 31.5 - 36.5 g/dL    RDW 18.6 (H) 10.0 - 15.0 %    Platelet Count 374 150 - 450 10e3/uL    % Neutrophils 42 %    % Lymphocytes 42 %    % Monocytes 11 %    % Eosinophils 5 %    % Basophils 1 %    % Immature Granulocytes 0 %    NRBCs per 100 WBC 0 <1 /100    Absolute Neutrophils 3.3 1.6 - 8.3 10e3/uL    Absolute Lymphocytes 3.3 0.8 - 5.3 10e3/uL    Absolute Monocytes 0.9 0.0 - 1.3 10e3/uL    Absolute Eosinophils 0.4 0.0 - 0.7 10e3/uL    Absolute Basophils 0.1 0.0 - 0.2 10e3/uL    Absolute Immature Granulocytes 0.0 <=0.4 10e3/uL    Absolute NRBCs 0.0 10e3/uL   CBC with platelets differential     Status: Abnormal    Narrative    The following orders were created for panel order CBC with platelets differential.  Procedure                                Abnormality         Status                     ---------                               -----------         ------                     CBC with platelets and d...[[id number]]  Abnormal            Final result                 Please view results for these tests on the individual orders.     Medications - No data to display       No results found for any visits on 02/14/25.  Medications - No data to display  Labs Ordered and Resulted from Time of ED Arrival to Time of ED Departure - No data to display  No orders to display          Critical care was not performed.     Medical Decision Making  The patient's presentation was of high complexity (an acute health issue posing potential threat to life or bodily function).    The patient's evaluation involved:  review of external note(s) from 3+ sources (see separate area of note for details)  review of 3+ test result(s) ordered prior to this encounter (see separate area of note for details)  ordering and/or review of 3+ test(s) in this encounter (see separate area of note for details)  discussion of management or test interpretation with another health professional (Family Medicine Resident)    The patient's management necessitated moderate risk (prescription drug management including medications given in the ED).    Assessment & Plan    Is a very nice 60-year-old male who presents to the ER due to 2 to 3 days of some small amount of hematuria that he is seeing when he starts urinating as well as some left-sided back pain.  Patient was initially evaluated for possible cause of the hematuria including possible kidney stone versus pyelonephritis.  Patient did not have any gross hematuria here on exam.  Patient's UA did show about 50 RBCs but no signs of clear infection.  Patient had no symptoms of an infection.  We did obtain a CT of the abdomen and pelvis that shows no acute abnormality no kidney stones.  Patient currently is not having any back pain.   It is possible that he had a stone that passed a few days prior.  Notably patient's labs did show a new acute anemia with a hemoglobin of 7.4.  I reviewed patient's medical chart as well as his labs over the past several years.  Patient's not had a CBC done in the last several years though he has had several hemoglobin A1c's BMPs and lipid panel was done by family medicine.  Patient currently denies any black tarry stools.  Says that he feels tired but this has been an ongoing problem for several months.  Does not appear that patient had an acute change is caused his anemia today.  Patient would need to be worked up for possible causes of anemia.  I feel that plan would be to discharge him home with iron tablets and follow-up in clinic in 2 days.  I did call and speak to the family medicine resident on-call.  She will send a message for patient to be seen in clinic emergently early this next week.  Patient agrees to return to the ER if he notices any black or tarry stools or if he develops any symptoms of chest pain shortness of breath or any other stigmata of acute anemia.  Patient also will need to follow-up with urology regarding the hematuria.  Patient likely needs to have a cystoscopy and further evaluation by urology.  Urology referral was placed.  Patient we discharged home with close follow-up.    I have reviewed the nursing notes. I have reviewed the findings, diagnosis, plan and need for follow up with the patient.    New Prescriptions    No medications on file       Final diagnoses:   Hematuria, unspecified type   Anemia, unspecified type     I, Марина Dyson, bryce serving as a trained medical scribe to document services personally performed by Palmira Hdez MD based on the provider's statements to me on February 14, 2025.  This document has been checked and approved by the attending provider.    I, Palmira Hdez MD, was physically present and have reviewed and verified the accuracy of this  note documented by Марина Dyson, medical scribe.      Palmira Hdez MD  [facility] EMERGENCY DEPARTMENT  2/14/2025     Palmira Hdez MD  02/15/25 1912

## 2025-02-15 NOTE — ED TRIAGE NOTES
"Since Thursday has noted ' small blood in my urine and my back hurts\"     Triage Assessment (Adult)       Row Name 02/14/25 8977          Triage Assessment    Airway WDL WDL        Respiratory WDL    Respiratory WDL WDL        Skin Circulation/Temperature WDL    Skin Circulation/Temperature WDL WDL        Cardiac WDL    Cardiac WDL WDL        Peripheral/Neurovascular WDL    Peripheral Neurovascular WDL WDL        Cognitive/Neuro/Behavioral WDL    Cognitive/Neuro/Behavioral WDL WDL                     "

## 2025-02-15 NOTE — DISCHARGE INSTRUCTIONS
Please take the iron tablets daily.     Please follow up with [facility] this week for repeat blood work.     Please return to the ER if you notice black stools or any chest pain or shortness of breath.     Please make an appointment to follow up with Urology Clinic (phone: [phone]) in 3-5 days even if entirely better.

## 2025-02-24 NOTE — TELEPHONE ENCOUNTER
MEDICAL RECORDS REQUEST   [facility]  Urology Clinic  [address] [place] [address]  PHONE: [phone]  Fax: [phone]        FUTURE VISIT INFORMATION                                                   Don Niño, : 1965 scheduled for future visit at [facility]    APPOINTMENT INFORMATION:  Date: 2025  Provider:  Blair Rincon PA-C  Reason for Visit/Diagnosis: Hematuria    REFERRAL INFORMATION:  Referring provider:  Palmira Hdez MD records @ [facility]    RECORDS REQUESTED FOR VISIT                                                     NOTES  STATUS/DETAILS   DISCHARGE REPORT from the ER  YES, 2025 -- [facility] ED   MEDICATION LIST  yes   LABS     URINALYSIS (UA)  yes   IMAGES  yes, 2025 -- CT ABD PELVIS       PRE-VISIT CHECKLIST      Joint diagnostic appointment coordinated correctly          (ensure right order & amount of time) Yes   RECORD COLLECTION COMPLETE Yes

## 2025-03-03 DIAGNOSIS — Z79.4 TYPE 2 DIABETES MELLITUS WITH OTHER CIRCULATORY COMPLICATION, WITH LONG-TERM CURRENT USE OF INSULIN (H): ICD-10-CM

## 2025-03-03 DIAGNOSIS — E11.59 TYPE 2 DIABETES MELLITUS WITH OTHER CIRCULATORY COMPLICATION, WITH LONG-TERM CURRENT USE OF INSULIN (H): ICD-10-CM

## 2025-03-03 NOTE — TELEPHONE ENCOUNTER
"Request for medication refill:    Medication Name: metFORMIN (GLUCOPHAGE XR) 500 MG 24 hr tablet     Providers if patient needs an appointment and you are willing to give a one month supply please refill for one month and  send a MyChart using \".SMILLIMITEDREFILL\" .Or route chart to \"P [facility] \" . To call patient and inform of limited refill and providers request to make an appointment. (Giving one month refill in non controlled medications is strongly recommended before denial)    If refill has been denied, meaning absolutely no refills without visit, please complete the smart phrase \".SMIRXREFUSE\" and route it to the \"P [facility] MED REFILLS\"  pool to inform the patient and the pharmacy.    Gisele Ward MA     "

## 2025-03-04 ENCOUNTER — MYC REFILL (OUTPATIENT)
Dept: FAMILY MEDICINE | Facility: CLINIC | Age: 60
End: 2025-03-04

## 2025-03-04 ENCOUNTER — TELEPHONE (OUTPATIENT)
Dept: FAMILY MEDICINE | Facility: CLINIC | Age: 60
End: 2025-03-04

## 2025-03-04 ENCOUNTER — OFFICE VISIT (OUTPATIENT)
Dept: FAMILY MEDICINE | Facility: CLINIC | Age: 60
End: 2025-03-04
Payer: COMMERCIAL

## 2025-03-04 VITALS
SYSTOLIC BLOOD PRESSURE: 123 MMHG | RESPIRATION RATE: 16 BRPM | DIASTOLIC BLOOD PRESSURE: 76 MMHG | BODY MASS INDEX: 28.28 KG/M2 | WEIGHT: 202 LBS | TEMPERATURE: 97.6 F | HEIGHT: 71 IN | HEART RATE: 97 BPM | OXYGEN SATURATION: 99 %

## 2025-03-04 DIAGNOSIS — N18.1 CKD STAGE G1/A2, GFR > 90 AND ALBUMIN CREATININE RATIO 30-299 MG/G: ICD-10-CM

## 2025-03-04 DIAGNOSIS — Z79.4 TYPE 2 DIABETES MELLITUS WITH OTHER CIRCULATORY COMPLICATION, WITH LONG-TERM CURRENT USE OF INSULIN (H): ICD-10-CM

## 2025-03-04 DIAGNOSIS — E78.5 HYPERLIPIDEMIA LDL GOAL <70: ICD-10-CM

## 2025-03-04 DIAGNOSIS — R31.29 OTHER MICROSCOPIC HEMATURIA: ICD-10-CM

## 2025-03-04 DIAGNOSIS — D62 ANEMIA DUE TO BLOOD LOSS, ACUTE: Primary | ICD-10-CM

## 2025-03-04 DIAGNOSIS — R80.9 MICROALBUMINURIA: ICD-10-CM

## 2025-03-04 DIAGNOSIS — R53.83 OTHER FATIGUE: ICD-10-CM

## 2025-03-04 DIAGNOSIS — R70.0 ELEVATED ERYTHROCYTE SEDIMENTATION RATE: ICD-10-CM

## 2025-03-04 DIAGNOSIS — E11.59 TYPE 2 DIABETES MELLITUS WITH OTHER CIRCULATORY COMPLICATION, WITH LONG-TERM CURRENT USE OF INSULIN (H): ICD-10-CM

## 2025-03-04 DIAGNOSIS — I10 ESSENTIAL HYPERTENSION WITH GOAL BLOOD PRESSURE LESS THAN 140/90: ICD-10-CM

## 2025-03-04 DIAGNOSIS — E61.1 IRON DEFICIENCY: Primary | ICD-10-CM

## 2025-03-04 DIAGNOSIS — I25.10 CORONARY ARTERY DISEASE INVOLVING NATIVE HEART WITHOUT ANGINA PECTORIS, UNSPECIFIED VESSEL OR LESION TYPE: ICD-10-CM

## 2025-03-04 DIAGNOSIS — Z12.11 SCREEN FOR COLON CANCER: ICD-10-CM

## 2025-03-04 LAB
ALBUMIN SERPL BCG-MCNC: 4.2 G/DL (ref 3.5–5.2)
ALP SERPL-CCNC: 103 U/L (ref 40–150)
ALT SERPL W P-5'-P-CCNC: 27 U/L (ref 0–70)
ANION GAP SERPL CALCULATED.3IONS-SCNC: 8 MMOL/L (ref 7–15)
AST SERPL W P-5'-P-CCNC: 21 U/L (ref 0–45)
BILIRUB SERPL-MCNC: 0.3 MG/DL
BUN SERPL-MCNC: 14.7 MG/DL (ref 8–23)
BURR CELLS BLD QL SMEAR: SLIGHT
CALCIUM SERPL-MCNC: 9.8 MG/DL (ref 8.8–10.4)
CHLORIDE SERPL-SCNC: 103 MMOL/L (ref 98–107)
CHOLEST SERPL-MCNC: 114 MG/DL
CREAT SERPL-MCNC: 0.9 MG/DL (ref 0.67–1.17)
CREAT UR-MCNC: 140 MG/DL
CRP SERPL-MCNC: 4.8 MG/L
EGFRCR SERPLBLD CKD-EPI 2021: >90 ML/MIN/1.73M2
ELLIPTOCYTES BLD QL SMEAR: SLIGHT
ERYTHROCYTE [DISTWIDTH] IN BLOOD BY AUTOMATED COUNT: ABNORMAL %
ERYTHROCYTE [SEDIMENTATION RATE] IN BLOOD BY WESTERGREN METHOD: 44 MM/HR (ref 0–20)
EST. AVERAGE GLUCOSE BLD GHB EST-MCNC: 174 MG/DL
FASTING STATUS PATIENT QL REPORTED: YES
FASTING STATUS PATIENT QL REPORTED: YES
FERRITIN SERPL-MCNC: 53 NG/ML (ref 31–409)
GLUCOSE SERPL-MCNC: 150 MG/DL (ref 70–99)
HBA1C MFR BLD: 7.7 % (ref 0–5.6)
HCO3 SERPL-SCNC: 25 MMOL/L (ref 22–29)
HCT VFR BLD AUTO: 38.3 % (ref 40–53)
HDLC SERPL-MCNC: 42 MG/DL
HGB BLD-MCNC: 10.9 G/DL (ref 13.3–17.7)
HOLD SPECIMEN: NORMAL
IRON BINDING CAPACITY (ROCHE): 336 UG/DL (ref 240–430)
IRON SATN MFR SERPL: 14 % (ref 15–46)
IRON SERPL-MCNC: 47 UG/DL (ref 61–157)
LDLC SERPL CALC-MCNC: 56 MG/DL
MCH RBC QN AUTO: 21.6 PG (ref 26.5–33)
MCHC RBC AUTO-ENTMCNC: 28.5 G/DL (ref 31.5–36.5)
MCV RBC AUTO: 76 FL (ref 78–100)
MICROALBUMIN UR-MCNC: 171 MG/L
MICROALBUMIN/CREAT UR: 122.14 MG/G CR (ref 0–17)
NONHDLC SERPL-MCNC: 72 MG/DL
PLAT MORPH BLD: ABNORMAL
PLATELET # BLD AUTO: 525 10E3/UL (ref 150–450)
POLYCHROMASIA BLD QL SMEAR: SLIGHT
POTASSIUM SERPL-SCNC: 4.8 MMOL/L (ref 3.4–5.3)
PROT SERPL-MCNC: 7.7 G/DL (ref 6.4–8.3)
RBC # BLD AUTO: 5.05 10E6/UL (ref 4.4–5.9)
RBC MORPH BLD: ABNORMAL
SODIUM SERPL-SCNC: 136 MMOL/L (ref 135–145)
TRIGL SERPL-MCNC: 80 MG/DL
WBC # BLD AUTO: 8.2 10E3/UL (ref 4–11)

## 2025-03-04 PROCEDURE — 83550 IRON BINDING TEST: CPT

## 2025-03-04 PROCEDURE — 99214 OFFICE O/P EST MOD 30 MIN: CPT | Mod: GC

## 2025-03-04 PROCEDURE — 82728 ASSAY OF FERRITIN: CPT

## 2025-03-04 PROCEDURE — 36415 COLL VENOUS BLD VENIPUNCTURE: CPT

## 2025-03-04 PROCEDURE — 80061 LIPID PANEL: CPT

## 2025-03-04 PROCEDURE — 80053 COMPREHEN METABOLIC PANEL: CPT

## 2025-03-04 PROCEDURE — 3078F DIAST BP <80 MM HG: CPT

## 2025-03-04 PROCEDURE — 86140 C-REACTIVE PROTEIN: CPT

## 2025-03-04 PROCEDURE — 83540 ASSAY OF IRON: CPT

## 2025-03-04 PROCEDURE — 3074F SYST BP LT 130 MM HG: CPT

## 2025-03-04 PROCEDURE — 82570 ASSAY OF URINE CREATININE: CPT

## 2025-03-04 PROCEDURE — 85652 RBC SED RATE AUTOMATED: CPT

## 2025-03-04 PROCEDURE — 83036 HEMOGLOBIN GLYCOSYLATED A1C: CPT

## 2025-03-04 PROCEDURE — 85027 COMPLETE CBC AUTOMATED: CPT

## 2025-03-04 PROCEDURE — 82043 UR ALBUMIN QUANTITATIVE: CPT

## 2025-03-04 RX ORDER — ASPIRIN 81 MG/1
81 TABLET ORAL DAILY
Qty: 90 TABLET | Refills: 1 | Status: SHIPPED | OUTPATIENT
Start: 2025-03-04

## 2025-03-04 RX ORDER — ALBUTEROL SULFATE 0.83 MG/ML
2.5 SOLUTION RESPIRATORY (INHALATION)
Status: CANCELLED | OUTPATIENT
Start: 2025-03-11

## 2025-03-04 RX ORDER — HEPARIN SODIUM,PORCINE 10 UNIT/ML
5-20 VIAL (ML) INTRAVENOUS DAILY PRN
Status: CANCELLED | OUTPATIENT
Start: 2025-03-11

## 2025-03-04 RX ORDER — MEPERIDINE HYDROCHLORIDE 25 MG/ML
25 INJECTION INTRAMUSCULAR; INTRAVENOUS; SUBCUTANEOUS
Status: CANCELLED | OUTPATIENT
Start: 2025-03-11

## 2025-03-04 RX ORDER — DIPHENHYDRAMINE HYDROCHLORIDE 50 MG/ML
25 INJECTION, SOLUTION INTRAMUSCULAR; INTRAVENOUS
Status: CANCELLED
Start: 2025-03-11

## 2025-03-04 RX ORDER — HEPARIN SODIUM (PORCINE) LOCK FLUSH IV SOLN 100 UNIT/ML 100 UNIT/ML
5 SOLUTION INTRAVENOUS
Status: CANCELLED | OUTPATIENT
Start: 2025-03-11

## 2025-03-04 RX ORDER — EPINEPHRINE 1 MG/ML
0.3 INJECTION, SOLUTION, CONCENTRATE INTRAVENOUS EVERY 5 MIN PRN
Status: CANCELLED | OUTPATIENT
Start: 2025-03-11

## 2025-03-04 RX ORDER — FOLIC ACID 0.4 MG
400 TABLET ORAL DAILY
Qty: 90 TABLET | Refills: 1 | Status: SHIPPED | OUTPATIENT
Start: 2025-03-04

## 2025-03-04 RX ORDER — INSULIN GLARGINE 100 [IU]/ML
INJECTION, SOLUTION SUBCUTANEOUS
Qty: 15 ML | Refills: 2 | Status: SHIPPED | OUTPATIENT
Start: 2025-03-04

## 2025-03-04 RX ORDER — METHYLPREDNISOLONE SODIUM SUCCINATE 40 MG/ML
40 INJECTION INTRAMUSCULAR; INTRAVENOUS
Status: CANCELLED
Start: 2025-03-11

## 2025-03-04 RX ORDER — INSULIN ASPART 100 [IU]/ML
INJECTION, SOLUTION INTRAVENOUS; SUBCUTANEOUS
Qty: 30 ML | Refills: 11 | Status: SHIPPED | OUTPATIENT
Start: 2025-03-04

## 2025-03-04 RX ORDER — DIPHENHYDRAMINE HYDROCHLORIDE 50 MG/ML
50 INJECTION, SOLUTION INTRAMUSCULAR; INTRAVENOUS
Status: CANCELLED
Start: 2025-03-11

## 2025-03-04 RX ORDER — MULTIVITAMIN WITH IRON
500 TABLET ORAL DAILY
Qty: 90 TABLET | Refills: 1 | Status: SHIPPED | OUTPATIENT
Start: 2025-03-04

## 2025-03-04 RX ORDER — ALBUTEROL SULFATE 90 UG/1
1-2 INHALANT RESPIRATORY (INHALATION)
Status: CANCELLED
Start: 2025-03-11

## 2025-03-04 RX ORDER — LISINOPRIL 5 MG/1
5 TABLET ORAL DAILY
Qty: 30 TABLET | Refills: 2 | Status: SHIPPED | OUTPATIENT
Start: 2025-03-04

## 2025-03-04 RX ORDER — ATORVASTATIN CALCIUM 80 MG/1
80 TABLET, FILM COATED ORAL DAILY
Qty: 90 TABLET | Refills: 1 | Status: SHIPPED | OUTPATIENT
Start: 2025-03-04

## 2025-03-04 NOTE — TELEPHONE ENCOUNTER
Requesting an update on this medication - pharmacy states they did not receive this message. I told pt we have a 3 day refill policy. Pt did not confirm understanding.

## 2025-03-04 NOTE — PROGRESS NOTES
Assessment & Plan     Anemia due to blood loss, acute  Other microscopic hematuria  CKD stage G1/A2, GFR > 90 and albumin creatinine ratio  mg/g  Other fatigue   Microalbuminuria  Elevated erythrocyte sedimentation rate  Following up after an ED visit 2/14/25 after a few days of hematuria and left sided back pain, and found to have microscopic hematuria, Hgb 7.4, S. Cr 1.03 (GFR 84) and CT negative for stones or obstruction. Unclear how reported hematuria could cause such anemia. Considering a differential of ureterolithiasis vs insensible losses (GI bleed) vs lower baseline with POORNIMA or anemia of chronic disease among others. Work up demonstrates improving Hgb which is reassuring for outpatient management, as well as low iron levels. Has elevated ESR and micro albuminemia which is concerning for mixed nephrotic vs nephritic disease, unexpected in setting of DM2 nephropathy. Will offer iron infusions for initial management with close follow up.  - CRP inflammation; Future  - Erythrocyte sedimentation rate auto; Future  - CBC with Platelets and Reflex to Iron Studies; Future  - Comprehensive metabolic panel; Future  - cyanocobalamin (VITAMIN B-12) 500 MCG tablet; Take 1 tablet (500 mcg) by mouth daily.  - folic acid (FOLVITE) 400 MCG tablet; Take 1 tablet (400 mcg) by mouth daily.  - Fecal colorectal cancer screen FITT; Future  - CRP inflammation  - Erythrocyte sedimentation rate auto  - CBC with Platelets and Reflex to Iron Studies  - Comprehensive metabolic panel  - Fecal colorectal cancer screen FITT  - Iron & Iron Binding Capacity  - Ferritin  - RBC and Platelet Morphology  - Adult Nephrology  Referral; Future  - Albumin Random Urine Quantitative with Creat Ratio; Future  - Albumin Random Urine Quantitative with Creat Ratio  - Adult Nephrology  Referral; Future    Type 2 diabetes mellitus with other circulatory complication, with long-term current use of insulin (H)  A1c 7.8 % 5/2024 and  "slightly improved to 7.7% today which is not yet at goal but reassuring. Refills provided. Recommended separate diabetes follow up visit.   - Hemoglobin A1c; Future  - insulin glargine (LANTUS SOLOSTAR) 100 UNIT/ML pen; INJECT 30 UNITS UNDER THE SKIN AT BEDTIME.  - insulin aspart (NOVOLOG FLEXPEN) 100 UNIT/ML pen; 14 units before meals three times a day  - Hemoglobin A1c    Screen for colon cancer  Reviewed need for colon cancer screening which has not been done yet. Contemplative stage. Offer again at follow up.     Coronary artery disease involving native heart without angina pectoris, unspecified vessel or lesion type  Hyperlipidemia LDL goal <70  Due for drug monitoring, LDL at goal, current dosing is appropriate. Refills provided.   - aspirin 81 MG EC tablet; Take 1 tablet (81 mg) by mouth daily.  - Lipid panel; Future  - Lipid panel  - atorvastatin (LIPITOR) 80 MG tablet; Take 1 tablet (80 mg) by mouth daily.  - Comprehensive metabolic panel    Essential hypertension with goal blood pressure less than 140/90  Stable, BP at goal <130/<80, lytes WNL. Current lisinopril is appropriate. Provided refill.   - lisinopril (ZESTRIL) 5 MG tablet; Take 1 tablet (5 mg) by mouth daily.    BMI  Estimated body mass index is 28.17 kg/m  as calculated from the following:    Height as of this encounter: 1.803 m (5' 11\").    Weight as of this encounter: 91.6 kg (202 lb).       Return in about 1 week (around 3/11/2025), or if symptoms worsen or fail to improve, for Follow up.    Katerin Ochoa is a 60 year old, presenting for the following health issues:  Pain, Labs Only (Labs request. Blood in urine and black stool. Currently fasting ), and Refill Request (On all medications )      3/4/2025     9:33 AM   Additional Questions   Roomed by Queenie   Accompanied by daughter         3/4/2025    Information    services provided? No     HPI      Don is a 60 year old male with a history of DM2, CKD stage 2 " "with proteinuria, htn and HLD who comes in today after ED visit Feb 14th.  -Had bloody urine with work up showing hematuria, Hgb  7.4 and normal CT scan (no stones, no dilation, no bladder calculi.   -He fell on his back before incident. Back pain is better, treated with heat packs and iron supplements and unknown medication.    -At the time, had abdominal pain before onset. No fevers, nausea, back pain, painful urination/urinary symptoms, or rashes.   -no bloody stools. No coughing up blood, no joint pain. No bloody vomit. Had black appearance on tongue after eating iron.   -fasting today.  -no recent travel outside [place] for years    Review of Systems  Constitutional, HEENT, cardiovascular, pulmonary, gi and gu systems are negative, except as otherwise noted.      Objective    /76   Pulse 97   Temp 97.6  F (36.4  C) (Tympanic)   Resp 16   Ht 1.803 m (5' 11\")   Wt 91.6 kg (202 lb)   SpO2 99%   BMI 28.17 kg/m    Body mass index is 28.17 kg/m .  Physical Exam   GENERAL: alert well appearing, and no distress  EYES: Eyes grossly normal to inspection, PERRL, EOMI and sclerae normal. Conjunctival pallor.   HENT: Ear canals and TM's normal, nose and mouth without ulcers or lesions  NECK: normal thyroid, no asymmetry, masses, or scars  RESP: NLB, lungs clear to auscultation - no rales, rhonchi or wheezes  CV: regular rate and rhythm, normal S1 S2, \no murmur,  no peripheral edema  ABDOMEN: soft, nontender, no flank tenderness, no hepatosplenomegaly, no masses and bowel sounds normal  MS: no gross musculoskeletal defects noted, no CVA tenderness  SKIN: no suspicious lesions or rashes  NEURO: Normal strength and tone, mentation intact and speech normal  PSYCH: mentation appears normal, affect normal/bright  LYMPH: no cervical or clavicular, axillary adenopathy    Results for orders placed or performed in visit on 03/04/25   CRP inflammation     Status: Normal   Result Value Ref Range    CRP Inflammation 4.80 <5.00 " mg/L   Erythrocyte sedimentation rate auto     Status: Abnormal   Result Value Ref Range    Erythrocyte Sedimentation Rate 44 (H) 0 - 20 mm/hr   Comprehensive metabolic panel     Status: Abnormal   Result Value Ref Range    Sodium 136 135 - 145 mmol/L    Potassium 4.8 3.4 - 5.3 mmol/L    Carbon Dioxide (CO2) 25 22 - 29 mmol/L    Anion Gap 8 7 - 15 mmol/L    Urea Nitrogen 14.7 8.0 - 23.0 mg/dL    Creatinine 0.90 0.67 - 1.17 mg/dL    GFR Estimate >90 >60 mL/min/1.73m2    Calcium 9.8 8.8 - 10.4 mg/dL    Chloride 103 98 - 107 mmol/L    Glucose 150 (H) 70 - 99 mg/dL    Alkaline Phosphatase 103 40 - 150 U/L    AST 21 0 - 45 U/L    ALT 27 0 - 70 U/L    Protein Total 7.7 6.4 - 8.3 g/dL    Albumin 4.2 3.5 - 5.2 g/dL    Bilirubin Total 0.3 <=1.2 mg/dL    Patient Fasting > 8hrs? Yes    Albumin Random Urine Quantitative with Creat Ratio     Status: Abnormal   Result Value Ref Range    Creatinine Urine mg/dL 140.0 mg/dL    Albumin Urine mg/L 171.0 mg/L    Albumin Urine mg/g Cr 122.14 (H) 0.00 - 17.00 mg/g Cr   Hemoglobin A1c     Status: Abnormal   Result Value Ref Range    Estimated Average Glucose 174 (H) <117 mg/dL    Hemoglobin A1C 7.7 (H) 0.0 - 5.6 %   Lipid panel     Status: None   Result Value Ref Range    Cholesterol 114 <200 mg/dL    Triglycerides 80 <150 mg/dL    Direct Measure HDL 42 >=40 mg/dL    LDL Cholesterol Calculated 56 <100 mg/dL    Non HDL Cholesterol 72 <130 mg/dL    Patient Fasting > 8hrs? Yes     Narrative    Cholesterol  Desirable: < 200 mg/dL  Borderline High: 200 - 239 mg/dL  High: >= 240 mg/dL    Triglycerides  Normal: < 150 mg/dL  Borderline High: 150 - 199 mg/dL  High: 200-499 mg/dL  Very High: >= 500 mg/dL    Direct Measure HDL  Female: >= 50 mg/dL   Male: >= 40 mg/dL    LDL Cholesterol  Desirable: < 100 mg/dL  Above Desirable: 100 - 129 mg/dL   Borderline High: 130 - 159 mg/dL   High:  160 - 189 mg/dL   Very High: >= 190 mg/dL    Non HDL Cholesterol  Desirable: < 130 mg/dL  Above Desirable: 130 -  159 mg/dL  Borderline High: 160 - 189 mg/dL  High: 190 - 219 mg/dL  Very High: >= 220 mg/dL   CBC with Platelets and Reflex to Iron Studies     Status: Abnormal   Result Value Ref Range    WBC Count 8.2 4.0 - 11.0 10e3/uL    RBC Count 5.05 4.40 - 5.90 10e6/uL    Hemoglobin 10.9 (L) 13.3 - 17.7 g/dL    Hematocrit 38.3 (L) 40.0 - 53.0 %    MCV 76 (L) 78 - 100 fL    MCH 21.6 (L) 26.5 - 33.0 pg    MCHC 28.5 (L) 31.5 - 36.5 g/dL    RDW      Platelet Count 525 (H) 150 - 450 10e3/uL    Narrative    Tech Comments  Name of Mac Trejo, RT    Laboratory Phone [phone]  What is Abnormal anisocytosis  Provider Follow Up Needed na  If Yes, Provider Contact Name na  If Yes, Provider Phone/Pager na         Extra Green Top (Lithium Heparin) Tube     Status: None   Result Value Ref Range    Hold Specimen [facility]    Iron & Iron Binding Capacity     Status: Abnormal   Result Value Ref Range    Iron 47 (L) 61 - 157 ug/dL    Iron Binding Capacity 336 240 - 430 ug/dL    Iron Sat Index 14 (L) 15 - 46 %   Ferritin     Status: Normal   Result Value Ref Range    Ferritin 53 31 - 409 ng/mL   RBC and Platelet Morphology     Status: Abnormal   Result Value Ref Range    RBC Morphology Confirmed RBC Indices     Platelet Assessment  Automated Count Confirmed. Platelet morphology is normal.     Automated Count Confirmed. Platelet morphology is normal.    [place] Cells Slight (A) None Seen    Elliptocytes Slight (A) None Seen    Polychromasia Slight (A) None Seen   CBC with Platelets and Reflex to Iron Studies     Status: Abnormal    Narrative    The following orders were created for panel order CBC with Platelets and Reflex to Iron Studies.  Procedure                               Abnormality         Status                     ---------                               -----------         ------                     CBC with Platelets and R...[[id number]]  Abnormal            Final result               Extra Green Top (Lithium...[[id number]]                       Final result                 Please view results for these tests on the individual orders.           Signed Electronically by: Anthony Michele MD  {Email feedback regarding this note to [email or web address]   :[id number]}

## 2025-03-04 NOTE — PATIENT INSTRUCTIONS
Patient Education   Here is the plan from today's visit    1. Type 2 diabetes mellitus with other circulatory complication, with long-term current use of insulin (H) (Primary)  I will check your A1c again. Please follow up for a diabetes dedicated visit.  - Hemoglobin A1c; Future  - insulin glargine (LANTUS SOLOSTAR) 100 UNIT/ML pen; INJECT 30 UNITS UNDER THE SKIN AT BEDTIME.  Dispense: 15 mL; Refill: 2  - insulin aspart (NOVOLOG FLEXPEN) 100 UNIT/ML pen; 14 units before meals three times a day  Dispense: 30 mL; Refill: 11    2. Screen for colon cancer  You need a screen for colon cancer when you get better in the next few month    3. Coronary artery disease involving native heart without angina pectoris, unspecified vessel or lesion type  4. Hyperlipidemia LDL goal <70  We will get labs for your cholesterol today and refills. Please discuss lab results at a follow up visit.   - aspirin 81 MG EC tablet; Take 1 tablet (81 mg) by mouth daily.  Dispense: 90 tablet; Refill: 1  - Lipid panel; Future  - atorvastatin (LIPITOR) 80 MG tablet; Take 1 tablet (80 mg) by mouth daily.  Dispense: 90 tablet; Refill: 1    5. Essential hypertension with goal blood pressure less than 140/90  Refill. Blood pressure looks good today.   - atorvastatin (LIPITOR) 80 MG tablet; Take 1 tablet (80 mg) by mouth daily.  Dispense: 90 tablet; Refill: 1  - lisinopril (ZESTRIL) 5 MG tablet; Take 1 tablet (5 mg) by mouth daily.  Dispense: 30 tablet; Refill: 2    6. Anemia due to blood loss, acute  7. Other microscopic hematuria  8. CKD stage G1/A2, GFR > 90 and albumin creatinine ratio  mg/g  I'm concerned about your blood loss. This has caused anemia which is making you feel tired. I think we should check your stool for blood. I want to repeat lab work. If your hemoglobin is 7 or lower, you need to be hospitalized by seeing the emergency department right away. I will check for inflammatory markers, and if elevated, you will get a nephrology  referral as this may be the reason for your blood loss. For now, I sapna give you a folic acid and B12 supplement to help you use the IV iron more effectively to make new blood cells. You can stop taking the iron tablet when you are doing IV iron for the next few weeks. You will have 3 doses of IV iron, and weeks after you finish up, I want you to be checked for anemia again. For now, I want you to come back in 1  - 2 weeks to check your hemoglobin again.   - Albumin Random Urine Quantitative with Creat Ratio; Future  - CRP inflammation; Future  - Erythrocyte sedimentation rate auto; Future  - CBC with Platelets and Reflex to Iron Studies; Future  - Comprehensive metabolic panel; Future  - cyanocobalamin (VITAMIN B-12) 500 MCG tablet; Take 1 tablet (500 mcg) by mouth daily.  Dispense: 90 tablet; Refill: 1  - folic acid (FOLVITE) 400 MCG tablet; Take 1 tablet (400 mcg) by mouth daily.  Dispense: 90 tablet; Refill: 1  - Occult blood stool; Future    Warning signs of bad anemia that needs Emergency Department visit:   1) lightheadedness and dizziness where you feel like passing out or pass out  2) worsening shortness of breath and chest pain or confusion  3) Seeing more bleeding  4) new abdominal pain, flank pain, back pain and nausea      Please call or return to clinic if your symptoms don't go away.    Follow up plan  Return in about 1 week (around 3/11/2025), or if symptoms worsen or fail to improve, for Follow up.    Thank you for coming to [facility] today.  Lab Testing:  **If you had lab testing today and your results are reassuring or normal they will be mailed to you or sent through [employer] within 7 days.   **If the lab tests need quick action we will call you with the results.  **If you are having labs done on a different day, please call [phone] to schedule at [facility] or [phone] for other [facility] locations. Labs do not offer walk-in appointments.  The phone number we  will call with results is # [phone] (home) [phone] (work). If this is not the best number please call our clinic and change the number.  Medication Refills:  If you need any refills please call your pharmacy and they will contact us.   If you need to  your refill at a new pharmacy, please contact the new pharmacy directly. The new pharmacy will help you get your medications transferred faster.   Scheduling:  If you have any concerns about today's visit or wish to schedule another appointment please call our office during normal business hours [phone] (8-5:00 M-F). If you can no longer make a scheduled visit, please cancel via [employer] or call us to cancel.   If a referral was made to an [facility] specialty provider and you do not get a call from central scheduling, please refer to directions on your visit summary or call our office during normal business hours for assistance.   If a Mammogram was ordered for you at the [facility] call [phone] to schedule or change your appointment.  If you had an XRay/CT/Ultrasound/MRI ordered the number is [phone] to schedule or change your radiology appointment.   [facility] has limited ultrasound appointments available on Wednesdays, if you would like your ultrasound at [facility], please call [phone] to schedule.   Medical Concerns:  If you have urgent medical concerns please call [phone] at any time of the day.    Anthony Michele MD               3/21/2025 Urology:   Palmira Hdez MD   [address] [facility] [place] [address]   Phone: [phone]   Fax: [phone]    Diagnoses: Hematuria, unspecified type   Order: Adult Urology  Referral       Comment: Please be aware that coverage of these services is subject to the terms and limitations of your health insurance plan.  Call member services at your health plan with any benefit or coverage questions.   [facility] will call you to coordinate  care as prescribed your provider. If you don t hear from a representative within 2 business days, please call [phone].

## 2025-03-05 RX ORDER — METFORMIN HYDROCHLORIDE 500 MG/1
2000 TABLET, EXTENDED RELEASE ORAL DAILY
Qty: 120 TABLET | Refills: 2 | Status: SHIPPED | OUTPATIENT
Start: 2025-03-05

## 2025-03-05 NOTE — TELEPHONE ENCOUNTER
"Request for medication refill:    Medication Name: ferrous sulfate (FEROSUL) 325 (65 Fe) MG tablet     Providers if patient needs an appointment and you are willing to give a one month supply please refill for one month and  send a MyChart using \".SMILLIMITEDREFILL\" .Or route chart to \"[facility] \" . To call patient and inform of limited refill and providers request to make an appointment. (Giving one month refill in non controlled medications is strongly recommended before denial)    If refill has been denied, meaning absolutely no refills without visit, please complete the smart phrase \".SMIRXREFUSE\" and route it to the \"[facility] MED REFILLS\"  pool to inform the patient and the pharmacy.    Gisele Ward MA     "

## 2025-03-06 RX ORDER — METFORMIN HYDROCHLORIDE 500 MG/1
2000 TABLET, EXTENDED RELEASE ORAL DAILY
Qty: 120 TABLET | Refills: 1 | OUTPATIENT
Start: 2025-03-06

## 2025-03-06 NOTE — TELEPHONE ENCOUNTER
Duplicate    metFORMIN (GLUCOPHAGE XR) 500 MG 24 hr tablet 120 tablet 2 3/5/2025   Agustina Block RN

## 2025-03-11 DIAGNOSIS — Z79.4 TYPE 2 DIABETES MELLITUS WITH OTHER CIRCULATORY COMPLICATION, WITH LONG-TERM CURRENT USE OF INSULIN (H): ICD-10-CM

## 2025-03-11 DIAGNOSIS — E11.59 TYPE 2 DIABETES MELLITUS WITH OTHER CIRCULATORY COMPLICATION, WITH LONG-TERM CURRENT USE OF INSULIN (H): ICD-10-CM

## 2025-03-12 ENCOUNTER — INFUSION THERAPY VISIT (OUTPATIENT)
Dept: INFUSION THERAPY | Facility: CLINIC | Age: 60
End: 2025-03-12
Attending: FAMILY MEDICINE
Payer: COMMERCIAL

## 2025-03-12 VITALS
RESPIRATION RATE: 16 BRPM | SYSTOLIC BLOOD PRESSURE: 126 MMHG | TEMPERATURE: 98.1 F | OXYGEN SATURATION: 98 % | HEART RATE: 90 BPM | DIASTOLIC BLOOD PRESSURE: 80 MMHG

## 2025-03-12 DIAGNOSIS — D62 ANEMIA DUE TO BLOOD LOSS, ACUTE: Primary | ICD-10-CM

## 2025-03-12 DIAGNOSIS — E11.59 TYPE 2 DIABETES MELLITUS WITH OTHER CIRCULATORY COMPLICATION, WITH LONG-TERM CURRENT USE OF INSULIN (H): ICD-10-CM

## 2025-03-12 DIAGNOSIS — Z79.4 TYPE 2 DIABETES MELLITUS WITH OTHER CIRCULATORY COMPLICATION, WITH LONG-TERM CURRENT USE OF INSULIN (H): ICD-10-CM

## 2025-03-12 PROCEDURE — 96365 THER/PROPH/DIAG IV INF INIT: CPT

## 2025-03-12 PROCEDURE — 96366 THER/PROPH/DIAG IV INF ADDON: CPT

## 2025-03-12 PROCEDURE — 258N000003 HC RX IP 258 OP 636

## 2025-03-12 PROCEDURE — 250N000011 HC RX IP 250 OP 636

## 2025-03-12 RX ORDER — METHYLPREDNISOLONE SODIUM SUCCINATE 40 MG/ML
40 INJECTION INTRAMUSCULAR; INTRAVENOUS
Start: 2025-03-14

## 2025-03-12 RX ORDER — HEPARIN SODIUM (PORCINE) LOCK FLUSH IV SOLN 100 UNIT/ML 100 UNIT/ML
5 SOLUTION INTRAVENOUS
OUTPATIENT
Start: 2025-03-14

## 2025-03-12 RX ORDER — ALBUTEROL SULFATE 90 UG/1
1-2 INHALANT RESPIRATORY (INHALATION)
Start: 2025-03-14

## 2025-03-12 RX ORDER — DIPHENHYDRAMINE HYDROCHLORIDE 50 MG/ML
25 INJECTION, SOLUTION INTRAMUSCULAR; INTRAVENOUS
Start: 2025-03-14

## 2025-03-12 RX ORDER — ALBUTEROL SULFATE 0.83 MG/ML
2.5 SOLUTION RESPIRATORY (INHALATION)
OUTPATIENT
Start: 2025-03-14

## 2025-03-12 RX ORDER — EPINEPHRINE 1 MG/ML
0.3 INJECTION, SOLUTION INTRAMUSCULAR; SUBCUTANEOUS EVERY 5 MIN PRN
OUTPATIENT
Start: 2025-03-14

## 2025-03-12 RX ORDER — MEPERIDINE HYDROCHLORIDE 25 MG/ML
25 INJECTION INTRAMUSCULAR; INTRAVENOUS; SUBCUTANEOUS
OUTPATIENT
Start: 2025-03-14

## 2025-03-12 RX ORDER — DIPHENHYDRAMINE HYDROCHLORIDE 50 MG/ML
50 INJECTION, SOLUTION INTRAMUSCULAR; INTRAVENOUS
Start: 2025-03-14

## 2025-03-12 RX ORDER — HEPARIN SODIUM,PORCINE 10 UNIT/ML
5-20 VIAL (ML) INTRAVENOUS DAILY PRN
OUTPATIENT
Start: 2025-03-14

## 2025-03-12 RX ADMIN — IRON SUCROSE 300 MG: 20 INJECTION, SOLUTION INTRAVENOUS at 12:32

## 2025-03-12 NOTE — PROGRESS NOTES
Infusion Nursing Note:  Don Niño presents today for Venofer - first dose, dose 1 of 3.  Patient seen by provider today: No   present during visit today: Patient declined  use today    Note:   Infusion over 90 minutes.    Intravenous Access:  Peripheral IV placed.    Treatment Conditions:  Not Applicable.    Post Infusion Assessment:  Patient tolerated infusion without incident.  Blood return noted pre and post infusion.  Site patent and intact, free from redness, edema or discomfort.  No evidence of extravasations.  Access discontinued per protocol.     Discharge Plan:   Patient and/or family verbalized understanding of discharge instructions and all questions answered.  Copy of AVS reviewed with patient and/or family.  Patient will return 3/14 for next appointment.  Patient discharged in stable condition accompanied by: son.  Departure Mode: Ambulatory.    Administrations This Visit       iron sucrose (VENOFER) 300 mg in sodium chloride 0.9 % 290 mL intermittent infusion       Admin Date  03/12/2025 Action  $New Bag Dose  300 mg Rate  193.3 mL/hr Route  Intravenous Documented By  Donna Paredes RN                  /80   Pulse 90   Temp 98.1  F (36.7  C) (Oral)   Resp 16   SpO2 98%     Donna Paredes RN

## 2025-03-12 NOTE — PATIENT INSTRUCTIONS
Dear Don Niño    Thank you for choosing [facility] ([facility]) for your infusion.  The following information is a summary of our appointment as well as important reminders.      We look forward in seeing you on your next appointment here at [facility] ([facility]).  Please don t hesitate to call us at [phone] to reschedule any of your appointments or to speak with one of the [facility] registered nurses.  It was a pleasure taking care of you today.    Sincerely,    [facility]  [facility]  [address] [place] [address]  [address]  Phone:  [phone]

## 2025-03-13 ENCOUNTER — PRE VISIT (OUTPATIENT)
Dept: UROLOGY | Facility: CLINIC | Age: 60
End: 2025-03-13
Payer: COMMERCIAL

## 2025-03-13 NOTE — TELEPHONE ENCOUNTER
Reason for visit: new patient     Relevant information: **NEED[facility] **, referred for Hematuria via Palmira Hdez MD on 2/15/25,  A&P showed acute anemia and hemoglobin 7.4    Records/imaging/labs/orders: All records available    At Rooming: Virtual visit      Clyde Klein  3/13/2025  9:31 AM

## 2025-03-17 RX ORDER — FERROUS SULFATE 325(65) MG
325 TABLET ORAL
Qty: 90 TABLET | Refills: 0 | Status: SHIPPED | OUTPATIENT
Start: 2025-03-17

## 2025-03-18 ENCOUNTER — INFUSION THERAPY VISIT (OUTPATIENT)
Dept: INFUSION THERAPY | Facility: CLINIC | Age: 60
End: 2025-03-18
Attending: FAMILY MEDICINE
Payer: COMMERCIAL

## 2025-03-18 VITALS
RESPIRATION RATE: 16 BRPM | TEMPERATURE: 98.2 F | OXYGEN SATURATION: 98 % | HEART RATE: 89 BPM | DIASTOLIC BLOOD PRESSURE: 75 MMHG | SYSTOLIC BLOOD PRESSURE: 118 MMHG

## 2025-03-18 DIAGNOSIS — D62 ANEMIA DUE TO BLOOD LOSS, ACUTE: Primary | ICD-10-CM

## 2025-03-18 PROCEDURE — 258N000003 HC RX IP 258 OP 636

## 2025-03-18 PROCEDURE — 96366 THER/PROPH/DIAG IV INF ADDON: CPT

## 2025-03-18 PROCEDURE — 250N000011 HC RX IP 250 OP 636

## 2025-03-18 PROCEDURE — 96365 THER/PROPH/DIAG IV INF INIT: CPT

## 2025-03-18 RX ORDER — EPINEPHRINE 1 MG/ML
0.3 INJECTION, SOLUTION INTRAMUSCULAR; SUBCUTANEOUS EVERY 5 MIN PRN
Status: CANCELLED | OUTPATIENT
Start: 2025-03-18

## 2025-03-18 RX ORDER — DIPHENHYDRAMINE HYDROCHLORIDE 50 MG/ML
50 INJECTION, SOLUTION INTRAMUSCULAR; INTRAVENOUS
Status: CANCELLED
Start: 2025-03-18

## 2025-03-18 RX ORDER — HEPARIN SODIUM,PORCINE 10 UNIT/ML
5-20 VIAL (ML) INTRAVENOUS DAILY PRN
Status: CANCELLED | OUTPATIENT
Start: 2025-03-18

## 2025-03-18 RX ORDER — HEPARIN SODIUM (PORCINE) LOCK FLUSH IV SOLN 100 UNIT/ML 100 UNIT/ML
5 SOLUTION INTRAVENOUS
Status: CANCELLED | OUTPATIENT
Start: 2025-03-18

## 2025-03-18 RX ORDER — ALBUTEROL SULFATE 90 UG/1
1-2 INHALANT RESPIRATORY (INHALATION)
Status: CANCELLED
Start: 2025-03-18

## 2025-03-18 RX ORDER — ALBUTEROL SULFATE 0.83 MG/ML
2.5 SOLUTION RESPIRATORY (INHALATION)
Status: CANCELLED | OUTPATIENT
Start: 2025-03-18

## 2025-03-18 RX ORDER — DIPHENHYDRAMINE HYDROCHLORIDE 50 MG/ML
25 INJECTION, SOLUTION INTRAMUSCULAR; INTRAVENOUS
Status: CANCELLED
Start: 2025-03-18

## 2025-03-18 RX ORDER — MEPERIDINE HYDROCHLORIDE 25 MG/ML
25 INJECTION INTRAMUSCULAR; INTRAVENOUS; SUBCUTANEOUS
Status: CANCELLED | OUTPATIENT
Start: 2025-03-18

## 2025-03-18 RX ORDER — METHYLPREDNISOLONE SODIUM SUCCINATE 40 MG/ML
40 INJECTION INTRAMUSCULAR; INTRAVENOUS
Status: CANCELLED
Start: 2025-03-18

## 2025-03-18 RX ADMIN — IRON SUCROSE 300 MG: 20 INJECTION, SOLUTION INTRAVENOUS at 12:38

## 2025-03-18 ASSESSMENT — PAIN SCALES - GENERAL: PAINLEVEL_OUTOF10: NO PAIN (0)

## 2025-03-18 NOTE — PATIENT INSTRUCTIONS
Dear Don Niño    Thank you for choosing [facility] ([facility]) for your infusion.  The following information is a summary of our appointment as well as important reminders.      If you have any questions on your upcoming Specialty Infusion appointments, please call scheduling at [phone].  It was a pleasure taking care of you today.    Sincerely,    [facility]  [address] [place] [address]  [address]  Phone:  [phone]

## 2025-03-18 NOTE — PROGRESS NOTES
[facility] - History & Physical      PCP: None None    Date of Admission: 3/6/2023    Date of Service: 3/6/2023    Chief Complaint:  Numbness     History Of Present Illness: The patient is a 41 y.o. male who presented to [facility] ER complaining of numbness. Patient was in his normal state of health this morning when he went into work. He works as a , with last dive being approximately two weeks ago, when he was walking around he noticed his right hand become numb. He states numbness then went to his left hand and he began having difficulty with word finding and slurred speech. He states symptoms lasted for half an hour and currently states he is back to his baseline. He denies recent illness, fall, or trauma. Denies ethanol or illicit drug use. Does not know family hx is adopted. Currently, does endorse mild headache. Work up in ER CT head no acute intracranial abnormality, CTA head neck no significant abnormality, CXR no acute cardiopulmonary process. Patient is to be observed by hospitalist service with consultation to neurology. Past Medical History:    No past medical history on file. Past Surgical History:    No past surgical history on file. Home Medications:  Prior to Admission medications    Not on File       Allergies:    Amoxicillin    Social History:    The patient currently lives home with spouse  Tobacco:   has no history on file for tobacco use. Alcohol:   has no history on file for alcohol use. Illicit Drugs: denies    Family History:  No family history on file. Review of Systems:   Review of Systems   Constitutional:  Negative for chills, diaphoresis, fatigue and fever. Respiratory:  Negative for cough, chest tightness, shortness of breath and wheezing. Cardiovascular:  Negative for chest pain and palpitations. Gastrointestinal:  Negative for abdominal distention, abdominal pain, constipation, nausea and vomiting.    Skin:  Negative for Infusion Nursing Note:  Don Niño presents today for Venofer (dose 3 of 3).    Patient seen by provider today: No   present during visit today: Not Applicable.    Note: Venofer infused over 90 mins.    Intravenous Access:  Peripheral IV placed.    Treatment Conditions:  Not Applicable.    /70 (BP Location: Left arm, Patient Position: Semi-Bobo's, Cuff Size: Adult Regular)   Pulse 95   Resp 16   SpO2 98%     Administrations This Visit       iron sucrose (VENOFER) 300 mg in sodium chloride 0.9 % 290 mL intermittent infusion       Admin Date  03/18/2025 Action  $New Bag Dose  300 mg Rate  193.3 mL/hr Route  Intravenous Documented By  Tabby Sharma RN                  Post Infusion Assessment:  Patient tolerated infusion without incident.  Site patent and intact, free from redness, edema or discomfort.  No evidence of extravasations.  Access discontinued per protocol.     Discharge Plan:   AVS to patient via MYCHART.    Patient discharged in stable condition accompanied by: self.  Departure Mode: Ambulatory.    Tabby Sharma RN   color change, pallor and rash. Neurological:  Positive for speech difficulty, numbness (right/left hand) and headaches. Negative for tremors, syncope, weakness and light-headedness. Psychiatric/Behavioral:  Negative for agitation, behavioral problems and confusion. 14 point review of systems is negative except as specifically addressed above. Physical Examination:  BP (!) 143/102   Pulse 55   Temp 97.7 °F (36.5 °C) (Oral)   Resp 17   Wt 182 lb (82.6 kg)   SpO2 97%   Physical Exam  Vitals and nursing note reviewed. Constitutional:       Appearance: Normal appearance. HENT:      Mouth/Throat:      Mouth: Mucous membranes are moist.      Pharynx: Oropharynx is clear. Eyes:      Extraocular Movements: Extraocular movements intact. Conjunctiva/sclera: Conjunctivae normal.      Pupils: Pupils are equal, round, and reactive to light. Cardiovascular:      Rate and Rhythm: Normal rate and regular rhythm. Pulses: Normal pulses. Heart sounds: Normal heart sounds. No murmur heard. Pulmonary:      Effort: Pulmonary effort is normal. No respiratory distress. Breath sounds: Normal breath sounds. Abdominal:      General: Bowel sounds are normal. There is no distension. Palpations: Abdomen is soft. Tenderness: There is no abdominal tenderness. There is no guarding or rebound. Musculoskeletal:         General: No swelling. Normal range of motion. Cervical back: Normal range of motion and neck supple. No rigidity or tenderness. Right lower leg: No edema. Left lower leg: No edema. Skin:     General: Skin is warm and dry. Capillary Refill: Capillary refill takes less than 2 seconds. Neurological:      General: No focal deficit present. Mental Status: He is alert and oriented to person, place, and time. GCS: GCS eye subscore is 4. GCS verbal subscore is 5. GCS motor subscore is 6.       Cranial Nerves: No cranial nerve deficit, dysarthria or facial asymmetry. Coordination: Finger-Nose-Finger Test normal. Rapid alternating movements normal.      Gait: Gait is intact. Gait normal.   Psychiatric:         Mood and Affect: Mood normal.         Behavior: Behavior normal.        Diagnostic Data:  CBC:  Recent Labs     03/06/23  1513   WBC 9.4   HGB 15.2   HCT 44.1        BMP:  Recent Labs     03/06/23  1513      K 4.0      CO2 32*   BUN 20   CREATININE 0.9   CALCIUM 9.5     Recent Labs     03/06/23  1513   AST 14   ALT 15   BILITOT 0.5   ALKPHOS 45     Coag Panel:   Recent Labs     03/06/23  1513   INR 0.99   PROTIME 13.0     Cardiac Enzymes:   Recent Labs     03/06/23  1513   TROPONINI <0.01       CT HEAD WO CONTRAST    Result Date: 3/6/2023  TECHNIQUE: An axial tomographic data set was acquired. The data was reviewed at multiple window and level settings. Automatic exposure control (AEC) was used for this study. CLINICAL HISTORY: Stroke symptoms COMPARISONS: None. FINDINGS: Acute intracranial hemorrhage: None. Extra-axial spaces: Unremarkable in light of the parenchymal volume. Ventricular system: Unremarkable. Basal cisterns: Normal. Midline shift: None. Vascular system: Unremarkable. Cerebral parenchyma: White matter: Normal. Parenchymal volume: The parenchymal volume is within normal limits. Cerebellum: Unremarkable. Brainstem: Within normal limits. Paranasal sinuses and mastoid air cells: The paranasal sinuses and mastoid air cells are well pneumatized. Orbits and extracranial soft tissues: Unremarkable. Osseous structures: There is no acute osseous injury. 1.No acute intracranial abnormality identified. If there is ongoing concern for acute infarct, recommend brain MRI. XR CHEST PORTABLE    Result Date: 3/6/2023  Exam: Portable chest radiograph. Technique: Single AP view of the chest was obtained. Clinical information: Code stroke Comparison: None available. Findings:  The lungs are clear without focal infiltrate or effusion.The heart is normal in size.There are no acute osseous abnormalities.    No radiographic evidence of acute cardiopulmonary process.    CTA HEAD NECK W CONTRAST    Result Date: 3/6/2023  CTA Head and Neck History: Stroke symptoms Technique: Axial helical acquisition of the head and neck with intravenous contrast was performed.Sagittal and coronal reconstructions were performed.3D postprocessing was utilized.Dose reduction techniques were utilized. COMPARISON: Brain CT from 3/6/2023 FINDINGS: Aortic Arch: Normal three-vessel anatomic configuration of the aortic arch is demonstrated. Origins of the great vessels appear normal. Extracranial Carotid Arterial System: The RIGHT common carotid artery, internal carotid artery, and external carotid artery demonstrate no abnormality. The LEFT common carotid artery, internal carotid artery, and external carotid artery demonstrate no abnormality. Extracranial Vertebral Arterial System: The RIGHT vertebral artery demonstrates no abnormality.The LEFT vertebral artery demonstrates no abnormality.The vertebral arteries are codominant. Intracranial Anterior Circulation: The RIGHT anterior circulation including the right internal carotid artery, middle cerebral artery, and anterior cerebral artery demonstrates no abnormality. The LEFT anterior circulation including the left internal carotid artery, middle cerebral artery, and anterior cerebral artery demonstrates no abnormality. The anterior communicating artery is unremarkable. No posterior communicating arteries are identified. Vertebrobasilar Circulation: The basilar artery is unremarkable. The RIGHT posterior cerebral artery, superior cerebellar artery, and posterior inferior cerebellar artery demonstrate no abnormality. The LEFT posterior cerebral artery, superior cerebellar artery, and posterior inferior cerebellar artery demonstrate no abnormality. Other: The visualized dural sinuses and intradural venous system are  unremarkable. No evidence of intracranial mass, mass effect, or abnormal enhancement. The skull base, calvaria, orbits, and overlying soft tissues are intact. The visualized soft tissues of the neck and superior mediastinum are within normal limits. Visualized lung apices are clear. No osseous abnormality is identified. 1.No evidence of hemodynamically significant vascular injury or aneurysm. If there is ongoing concern for acute infarct, recommend brain MRI. Assessment/Plan:  Principal Problem:    Numbness of right hand/ Difficulty with speech  - Neurology consultation and recommendations appreciated   -MRI brain               - Aspirin and Lipitor               - NIHSS/neuro checks              - ECHO               - FLP/hemoglobin A1c              - Monitor on telemetry              - Swallow screen by nursing before PO intake              - Fall precaution   - Allow permissive hypertension                - PRN labetalol with strict parameters     DVT prophylactic: Lovenox     Signed:  CASH Carter - CNP, 3/6/2023 4:39 PM       EMR Dragon/Transcription disclaimer:   Much of this encounter note is an electronic transcription/translation of spoken language to printed text.  The electronic translation of spoken language may permit erroneous, or at times, nonsensical words or phrases to be inadvertently transcribed; although attempts have made to review the note for such errors, some may still exist.

## 2025-03-20 ENCOUNTER — TELEPHONE (OUTPATIENT)
Dept: FAMILY MEDICINE | Facility: CLINIC | Age: 60
End: 2025-03-20
Payer: COMMERCIAL

## 2025-03-20 NOTE — LETTER
3/20/2025         RE: Don Niño  [address]  [place] [facility] [address]  \    March 20, 2025    To  Don Niño  [address]  [facility] [address]    Your team at [facility] cares about your health. We have reviewed your chart and based on our findings; we are making the following recommendations to better manage your health.     You are in particular need of attention regarding the following:     PREVENTATIVE VISIT: Physical    If you have already completed these items, please contact the clinic via phone or   [employer] so your care team can review and update your records. Thank you for   choosing [facility] for your healthcare needs. For any questions,   concerns, or to schedule an appointment please contact our clinic.    Healthy Regards,      Your [facility] Care Team

## 2025-03-20 NOTE — TELEPHONE ENCOUNTER
Patient Quality Outreach    Patient is due for the following:   Physical Preventive Adult Physical    Action(s) Taken:   Schedule a Adult Preventative    Type of outreach:    Sent Identiv message.    Questions for provider review:    None         Adrianna Vásquez CMA  Chart routed to .

## 2025-03-21 ENCOUNTER — PRE VISIT (OUTPATIENT)
Dept: UROLOGY | Facility: CLINIC | Age: 60
End: 2025-03-21

## 2025-03-24 LAB — HEMOCCULT STL QL IA: NEGATIVE

## 2025-03-28 ENCOUNTER — APPOINTMENT (OUTPATIENT)
Dept: INTERPRETER SERVICES | Facility: CLINIC | Age: 60
End: 2025-03-28
Payer: COMMERCIAL

## 2025-03-29 ENCOUNTER — ANCILLARY PROCEDURE (OUTPATIENT)
Dept: CT IMAGING | Facility: CLINIC | Age: 60
End: 2025-03-29
Attending: STUDENT IN AN ORGANIZED HEALTH CARE EDUCATION/TRAINING PROGRAM
Payer: COMMERCIAL

## 2025-03-29 DIAGNOSIS — R31.0 GROSS HEMATURIA: ICD-10-CM

## 2025-03-29 LAB — RADIOLOGIST FLAGS: NORMAL

## 2025-03-29 PROCEDURE — 74178 CT ABD&PLV WO CNTR FLWD CNTR: CPT | Performed by: RADIOLOGY

## 2025-03-29 RX ORDER — IOPAMIDOL 755 MG/ML
99 INJECTION, SOLUTION INTRAVASCULAR ONCE
Status: COMPLETED | OUTPATIENT
Start: 2025-03-29 | End: 2025-03-29

## 2025-03-29 RX ADMIN — IOPAMIDOL 99 ML: 755 INJECTION, SOLUTION INTRAVASCULAR at 14:35

## 2025-03-29 NOTE — DISCHARGE INSTRUCTIONS

## 2025-03-31 ENCOUNTER — TELEPHONE (OUTPATIENT)
Dept: UROLOGY | Facility: CLINIC | Age: 60
End: 2025-03-31
Payer: COMMERCIAL

## 2025-03-31 DIAGNOSIS — K86.89 PANCREATIC MASS: Primary | ICD-10-CM

## 2025-03-31 NOTE — TELEPHONE ENCOUNTER
CT urogram results noted.  MRI order placed for concern for pancreatic tail mass.  Clinic coordinators, would you please assist me in contacting this patient to schedule this first available?  Thank you!

## 2025-03-31 NOTE — TELEPHONE ENCOUNTER
Left Voicemail (1st Attempt) for the patient to call back and schedule the following:    Appointment type: Imaging  Provider: Imaging  Return date: MRI first available, per Francisco Javier.   Specialty phone number: [phone]  Additional appointment(s) needed:   Additonal Notes:  concern for pancreatic tail mass.

## 2025-03-31 NOTE — TELEPHONE ENCOUNTER
[facility] Call Center    Phone Message    May a detailed message be left on voicemail: no     Reason for Call: Other: Imaging calls to report incidental finding of Pancreas mass and are recommending MRI per their pancreas protocols.      Action Taken: Message routed to:  [facility] Urology    Travel Screening: Not Applicable

## 2025-04-01 NOTE — TELEPHONE ENCOUNTER
Left Voicemail (2nd Attempt) for the patient to call back and schedule the following:     Appointment type: Imaging  Provider: Imaging  Return date: MRI first available, per Francisco Javier.   Specialty phone number: [phone]  Additional appointment(s) needed:   Additonal Notes:  concern for pancreatic tail mass.

## 2025-04-02 NOTE — TELEPHONE ENCOUNTER
Left Voicemail (3rd Attempt) for the patient to call back and schedule the following:     Appointment type: Imaging  Provider: Imaging  Return date: MRI first available, per Francisco Javier.   Specialty phone number: [phone]  Additional appointment(s) needed:   Additonal Notes:  concern for pancreatic tail mass.

## 2025-04-04 ENCOUNTER — OFFICE VISIT (OUTPATIENT)
Dept: FAMILY MEDICINE | Facility: CLINIC | Age: 60
End: 2025-04-04
Payer: COMMERCIAL

## 2025-04-04 VITALS
OXYGEN SATURATION: 97 % | RESPIRATION RATE: 16 BRPM | SYSTOLIC BLOOD PRESSURE: 130 MMHG | HEART RATE: 93 BPM | HEIGHT: 71 IN | TEMPERATURE: 97.9 F | BODY MASS INDEX: 28.42 KG/M2 | DIASTOLIC BLOOD PRESSURE: 78 MMHG | WEIGHT: 203 LBS

## 2025-04-04 DIAGNOSIS — K86.89 PANCREATIC MASS: ICD-10-CM

## 2025-04-04 DIAGNOSIS — E78.5 HYPERLIPIDEMIA LDL GOAL <70: ICD-10-CM

## 2025-04-04 DIAGNOSIS — Z79.4 TYPE 2 DIABETES MELLITUS WITH OTHER CIRCULATORY COMPLICATION, WITH LONG-TERM CURRENT USE OF INSULIN (H): Primary | ICD-10-CM

## 2025-04-04 DIAGNOSIS — I25.10 CORONARY ARTERY DISEASE INVOLVING NATIVE HEART WITHOUT ANGINA PECTORIS, UNSPECIFIED VESSEL OR LESION TYPE: ICD-10-CM

## 2025-04-04 DIAGNOSIS — E11.59 TYPE 2 DIABETES MELLITUS WITH OTHER CIRCULATORY COMPLICATION, WITH LONG-TERM CURRENT USE OF INSULIN (H): Primary | ICD-10-CM

## 2025-04-04 DIAGNOSIS — I10 ESSENTIAL HYPERTENSION WITH GOAL BLOOD PRESSURE LESS THAN 140/90: ICD-10-CM

## 2025-04-04 PROCEDURE — 3075F SYST BP GE 130 - 139MM HG: CPT

## 2025-04-04 PROCEDURE — 99214 OFFICE O/P EST MOD 30 MIN: CPT | Mod: GC

## 2025-04-04 PROCEDURE — 3078F DIAST BP <80 MM HG: CPT

## 2025-04-04 RX ORDER — INSULIN GLARGINE 100 [IU]/ML
INJECTION, SOLUTION SUBCUTANEOUS
Qty: 15 ML | Refills: 2 | Status: SHIPPED | OUTPATIENT
Start: 2025-04-04

## 2025-04-04 RX ORDER — METFORMIN HYDROCHLORIDE 500 MG/1
2000 TABLET, EXTENDED RELEASE ORAL DAILY
Qty: 120 TABLET | Refills: 2 | Status: SHIPPED | OUTPATIENT
Start: 2025-04-04

## 2025-04-04 RX ORDER — ASPIRIN 81 MG/1
81 TABLET ORAL DAILY
Qty: 90 TABLET | Refills: 1 | Status: SHIPPED | OUTPATIENT
Start: 2025-04-04

## 2025-04-04 RX ORDER — INSULIN ASPART 100 [IU]/ML
INJECTION, SOLUTION INTRAVENOUS; SUBCUTANEOUS
Qty: 30 ML | Refills: 11 | Status: SHIPPED | OUTPATIENT
Start: 2025-04-04

## 2025-04-04 RX ORDER — LISINOPRIL 5 MG/1
5 TABLET ORAL DAILY
Qty: 30 TABLET | Refills: 2 | Status: SHIPPED | OUTPATIENT
Start: 2025-04-04

## 2025-04-04 RX ORDER — ATORVASTATIN CALCIUM 80 MG/1
80 TABLET, FILM COATED ORAL DAILY
Qty: 90 TABLET | Refills: 1 | Status: SHIPPED | OUTPATIENT
Start: 2025-04-04

## 2025-04-04 NOTE — PATIENT INSTRUCTIONS
Patient Education   Here is the plan from today's visit    1. Type 2 diabetes mellitus with other circulatory complication, with long-term current use of insulin (H)    - metFORMIN (GLUCOPHAGE XR) 500 MG 24 hr tablet; Take 4 tablets (2,000 mg) by mouth daily.  Dispense: 120 tablet; Refill: 2  - insulin aspart (NOVOLOG FLEXPEN) 100 UNIT/ML pen; 14 units before meals three times a day  Dispense: 30 mL; Refill: 11  - insulin glargine (LANTUS SOLOSTAR) 100 UNIT/ML pen; INJECT 30 UNITS UNDER THE SKIN AT BEDTIME.  Dispense: 15 mL; Refill: 2  - Adult Eye  Referral; Future    2. Coronary artery disease involving native heart without angina pectoris, unspecified vessel or lesion type    - aspirin 81 MG EC tablet; Take 1 tablet (81 mg) by mouth daily.  Dispense: 90 tablet; Refill: 1    3. Essential hypertension with goal blood pressure less than 140/90  - lisinopril (ZESTRIL) 5 MG tablet; Take 1 tablet (5 mg) by mouth daily.  Dispense: 30 tablet; Refill: 2  - atorvastatin (LIPITOR) 80 MG tablet; Take 1 tablet (80 mg) by mouth daily.  Dispense: 90 tablet; Refill: 1    4. Hyperlipidemia LDL goal <70  - atorvastatin (LIPITOR) 80 MG tablet; Take 1 tablet (80 mg) by mouth daily.  Dispense: 90 tablet; Refill: 1    5. Pancreatic mass (Primary)  - MR Pancreas wo & w Contrast; Future          Please call or return to clinic if your symptoms don't go away.    Follow up plan  No follow-ups on file.    Thank you for coming to [facility] today.  Lab Testing:  **If you had lab testing today and your results are reassuring or normal they will be mailed to you or sent through [employer] within 7 days.   **If the lab tests need quick action we will call you with the results.  **If you are having labs done on a different day, please call [phone] to schedule at [facility] or [phone] for other [facility] locations. Labs do not offer walk-in appointments.  The phone number we will call with results is #  [phone] (home) [phone] (work). If this is not the best number please call our clinic and change the number.  Medication Refills:  If you need any refills please call your pharmacy and they will contact us.   If you need to  your refill at a new pharmacy, please contact the new pharmacy directly. The new pharmacy will help you get your medications transferred faster.   Scheduling:  If you have any concerns about today's visit or wish to schedule another appointment please call our office during normal business hours [phone] (8-5:00 M-F). If you can no longer make a scheduled visit, please cancel via [email or web address] or call us to cancel.   If a referral was made to an [facility] specialty provider and you do not get a call from central scheduling, please refer to directions on your visit summary or call our office during normal business hours for assistance.   If a Mammogram was ordered for you at the Breast Center call [phone] to schedule or change your appointment.  If you had an XRay/CT/Ultrasound/MRI ordered the number is [phone] to schedule or change your radiology appointment.   [facility] has limited ultrasound appointments available on Wednesdays, if you would like your ultrasound at [facility], please call [phone] to schedule.   Medical Concerns:  If you have urgent medical concerns please call [phone] at any time of the day.    Stephane Lopez MD

## 2025-04-04 NOTE — PROGRESS NOTES
"  Assessment & Plan     Type 2 diabetes mellitus with other circulatory complication, with long-term current use of insulin (H)  Most recent A1c 7.7 on 3/04. Slightly improve from 7.8 5/24. Asymptomatic. Exam unremarkable. No medication changes. Refills given. Plan to follow up in 1-2 month for repeat labs. Eye referral given.   - metFORMIN (GLUCOPHAGE XR) 500 MG 24 hr tablet  Dispense: 120 tablet; Refill: 2  - insulin aspart (NOVOLOG FLEXPEN) 100 UNIT/ML pen  Dispense: 30 mL; Refill: 11  - insulin glargine (LANTUS SOLOSTAR) 100 UNIT/ML pen  Dispense: 15 mL; Refill: 2  - Adult Eye  Referral    Coronary artery disease involving native heart without angina pectoris, unspecified vessel or lesion type  Refills given.   - aspirin 81 MG EC tablet  Dispense: 90 tablet; Refill: 1    Essential hypertension with goal blood pressure less than 140/90  At goal. No medication changes. Refills given.   - lisinopril (ZESTRIL) 5 MG tablet  Dispense: 30 tablet; Refill: 2  - atorvastatin (LIPITOR) 80 MG tablet  Dispense: 90 tablet; Refill: 1    Hyperlipidemia LDL goal <70  Refills given.   - atorvastatin (LIPITOR) 80 MG tablet  Dispense: 90 tablet; Refill: 1    Pancreatic mass  Noted 1.3 cm possible pancreatic tail mass on CT scan. Differential noted to includes  neuroendocrine tumor. Will obtain MRI for pancrease mass follow up.   - MR Pancreas wo & w Contrast        BMI  Estimated body mass index is 28.31 kg/m  as calculated from the following:    Height as of this encounter: 1.803 m (5' 11\").    Weight as of this encounter: 92.1 kg (203 lb).       Return in about 1 month (around 5/4/2025) for Follow up diabetes.    Katerin Ochoa is a 60 year old, presenting for the following health issues:  Clinic Care Coordination - Follow-up (Diabetes )        4/4/2025     1:03 PM   Additional Questions   Roomed by je   Accompanied by family and son         4/4/2025    Information    services provided? No " "    HPI        Diabetes Follow-up    How often are you checking your blood sugar? Three times daily  Blood sugar testing frequency justification:  Uncontrolled diabetes  What time of day are you checking your blood sugars (select all that apply)?  AM, After meals and At bedtime  Have you had any blood sugars above 200?  Yes   Have you had any blood sugars below 70?  No, 80 lowest   What symptoms do you notice when your blood sugar is low?  None  What concerns do you have today about your diabetes? None   Do you have any of these symptoms? (Select all that apply)  No numbness or tingling in feet.  No redness, sores or blisters on feet.  No complaints of excessive thirst.  No reports of blurry vision.  No significant changes to weight.  Have you had a diabetic eye exam in the last 12 months? No    Home BG monitoring:  's  Afternon 300's with meal  's      BP Readings from Last 2 Encounters:   04/04/25 130/78   03/18/25 118/75     Hemoglobin A1C (%)   Date Value   03/04/2025 7.7 (H)   05/24/2024 7.8 (H)   02/25/2021 8.2 (H)   12/31/2019 10.0 (H)     LDL Cholesterol Calculated (mg/dL)   Date Value   03/04/2025 56   05/24/2024 68   02/25/2021 74   07/01/2019 62     Regimen:   - insulin aspart 14 units before meal TID  - insulin glargine 30 unit(s) bed  - metFORMIN - MAXED        Review of Systems  Constitutional, HEENT, cardiovascular, pulmonary, gi and gu systems are negative, except as otherwise noted.      Objective    /78   Pulse 93   Temp 97.9  F (36.6  C) (Oral)   Resp 16   Ht 1.803 m (5' 11\")   Wt 92.1 kg (203 lb)   SpO2 97%   BMI 28.31 kg/m    Body mass index is 28.31 kg/m .  Physical Exam   GENERAL: alert and no distress  EYES: Eyes grossly normal to inspection, PERRL and conjunctivae and sclerae normal  RESP: lungs clear to auscultation - no rales, rhonchi or wheezes  CV: regular rate and rhythm, normal S1 S2, no S3 or S4, no murmur, click or rub, no peripheral edema  NEURO:  mentation " intact and speech normal  PSYCH: mentation appears normal, affect normal/bright  Diabetic foot exam: normal DP and PT pulses, no trophic changes or ulcerative lesions, and normal sensory exam          Signed Electronically by: Stephane Lopez MD

## 2025-04-07 ENCOUNTER — APPOINTMENT (OUTPATIENT)
Dept: INTERPRETER SERVICES | Facility: CLINIC | Age: 60
End: 2025-04-07
Payer: COMMERCIAL

## 2025-04-24 ENCOUNTER — PATIENT OUTREACH (OUTPATIENT)
Dept: CARE COORDINATION | Facility: CLINIC | Age: 60
End: 2025-04-24
Payer: COMMERCIAL

## 2025-05-08 ENCOUNTER — PATIENT OUTREACH (OUTPATIENT)
Dept: CARE COORDINATION | Facility: CLINIC | Age: 60
End: 2025-05-08
Payer: COMMERCIAL

## 2025-05-11 ENCOUNTER — ANCILLARY PROCEDURE (OUTPATIENT)
Dept: MRI IMAGING | Facility: CLINIC | Age: 60
End: 2025-05-11
Attending: FAMILY MEDICINE
Payer: COMMERCIAL

## 2025-05-11 DIAGNOSIS — K86.89 PANCREATIC MASS: ICD-10-CM

## 2025-05-11 PROCEDURE — 74183 MRI ABD W/O CNTR FLWD CNTR: CPT | Performed by: STUDENT IN AN ORGANIZED HEALTH CARE EDUCATION/TRAINING PROGRAM

## 2025-05-11 PROCEDURE — A9585 GADOBUTROL INJECTION: HCPCS | Performed by: STUDENT IN AN ORGANIZED HEALTH CARE EDUCATION/TRAINING PROGRAM

## 2025-05-11 PROCEDURE — T1013 SIGN LANG/ORAL INTERPRETER: HCPCS

## 2025-05-11 RX ORDER — GADOBUTROL 604.72 MG/ML
10 INJECTION INTRAVENOUS ONCE
Status: COMPLETED | OUTPATIENT
Start: 2025-05-11 | End: 2025-05-11

## 2025-05-11 RX ADMIN — GADOBUTROL 9 ML: 604.72 INJECTION INTRAVENOUS at 13:40

## 2025-05-13 ENCOUNTER — RESULTS FOLLOW-UP (OUTPATIENT)
Dept: FAMILY MEDICINE | Facility: CLINIC | Age: 60
End: 2025-05-13

## 2025-05-13 ENCOUNTER — PRE VISIT (OUTPATIENT)
Dept: UROLOGY | Facility: CLINIC | Age: 60
End: 2025-05-13
Payer: COMMERCIAL

## 2025-05-13 NOTE — TELEPHONE ENCOUNTER
Reason for visit: Cystoscopy         Relevant information: **NEEDS [facility] **, gross hematuria    Records/imaging/labs/orders: CT urogram done 3/29/25    Pt called: no need for a call    At Rooming: ask symptoms    Clyde Klein  5/13/2025  1:52 PM

## 2025-05-20 NOTE — PROGRESS NOTES
HISTORY OF PRESENT ILLNESS:  Mr. Niño is a pleasant 60 year old male who I am seeing today for cystoscopy given his gross hematuria.  I met with him for this initially on 3/21/2025, at which time we discussed moving forward with a full gross hematuria workup as well as starting his prostate cancer screening with a PSA.    CT urogram completed on 3/29/2025 did not show any malignant etiology of his hematuria, though did discover a 1.3 cm pancreatic tail mass.  Follow-up MRI of the pancreas unfortunately still found this area to be indeterminant but was most likely IPMN, so plan is to follow-up with another  pancreas in November.    Today  Mr. Niño did not initially think he was here for a cystoscopy and thought he was here for discussion of his CT urogram and pancreatic MRI results.  I sat with the patient for 10 minutes going over his results as well as the reason for his cystoscopy today and ultimately this did lead to him being comfortable with the cystoscopy.  This time with the patient going over results should be billed separately than the cystoscopy that was done    PROCEDURE:    Mr. Niño was brought to the cystoscopy suite and placed in the lithotomy position. he was prepped and draped in the standard fashion.  A FLEXIBLE CYSTOSCOPE was inserted through the urethra and into the bladder.  Urethroscopy was normal. The sphincter coapted normally.  The prostate exhibited moderate bilobar hypertrophy with evidence of the median lobe. The bladder was entered and inspected.  There were no diverticula, cellules, stones or foreign bodies noted.  Mild trabeculations were noted. The ureteral orifices were identified in their orthotopic position bilaterally effluxing clear urine.  There were no areas of concerning erythema or papillary lesions. Retroflex view demonstrated moderate intravesical protrusion of the prostate with a moderately sized median lobe also intruding even further than this. The cystoscope was  then removed with no evidence of active bleeding.  Mr. Niño tolerated the procedure well.     IMPRESSION:   Gross hematuria   BPH with moderate intravesical protrusion and obstruction with a moderate size median lobe  Pancreatic IPMN    It was my pleasure to speak with Mr. Niño alongside his wife and daughter first to go over the results of his CT urogram and pancreatic MRI as well as to complete a cystoscopy for his gross hematuria workup.  After reviewing his clinical history, I first discussed with him that his CT urogram luckily did not show any evidence of any concerning etiologies of his gross hematuria.  The most significant finding was that of the pancreatic tail lesion that the MRI revealed to be a likely IPMN.  With this in mind, we will look to get his MRI completed in roughly 6 months, get his PSA done in approximately 1 month with a follow-up with me as soon after that is possible, and finally look to get a repeat urinalysis in 1 year.    I was happy to let him know that his cystoscopy did not show any evidence of malignant etiology of his hematuria meaning we just need to get the above-mentioned urinalysis in 1 year.  I did specifically want a meet with him either virtually or in the office sometime soon to discuss the cystoscopic findings and be sure that from a urinary standpoint he is doing okay and go into more detail about BPH management options especially given his gross hematuria which is most likely due to his large obstructing prostate.    Mr. Niño expressed understanding and agreement to the above discussion and plan and all of his questions were answered to his satisfaction.     PLAN:   Pancreatic MRI in 6 months  PSA in 3-4 weeks with follow-up visit with Blair Rincon PA-C shortly afterward  Urinalysis in one year.    Signed by:    Blair Rincon PA-C  [facility]  [address] [place] [address]

## 2025-05-21 ENCOUNTER — OFFICE VISIT (OUTPATIENT)
Dept: UROLOGY | Facility: CLINIC | Age: 60
End: 2025-05-21
Payer: COMMERCIAL

## 2025-05-21 VITALS
BODY MASS INDEX: 28.28 KG/M2 | SYSTOLIC BLOOD PRESSURE: 127 MMHG | HEIGHT: 71 IN | HEART RATE: 101 BPM | OXYGEN SATURATION: 94 % | WEIGHT: 202 LBS | DIASTOLIC BLOOD PRESSURE: 80 MMHG

## 2025-05-21 DIAGNOSIS — R31.0 GROSS HEMATURIA: Primary | ICD-10-CM

## 2025-05-21 DIAGNOSIS — N13.8 BENIGN PROSTATIC HYPERPLASIA WITH URINARY OBSTRUCTION: ICD-10-CM

## 2025-05-21 DIAGNOSIS — K86.89 PANCREATIC MASS: ICD-10-CM

## 2025-05-21 DIAGNOSIS — N40.1 BENIGN PROSTATIC HYPERPLASIA WITH URINARY OBSTRUCTION: ICD-10-CM

## 2025-05-21 RX ORDER — LIDOCAINE HYDROCHLORIDE 20 MG/ML
JELLY TOPICAL ONCE
Status: COMPLETED | OUTPATIENT
Start: 2025-05-21 | End: 2025-05-21

## 2025-05-21 RX ADMIN — LIDOCAINE HYDROCHLORIDE: 20 JELLY TOPICAL at 16:00

## 2025-05-21 ASSESSMENT — PAIN SCALES - GENERAL: PAINLEVEL_OUTOF10: NO PAIN (0)

## 2025-05-21 NOTE — NURSING NOTE
"Chief Complaint   Patient presents with    Cystoscopy      number [id number] Maximilian Arceo states here for cystoscopy       Blood pressure 127/80, pulse 101, height 1.803 m (5' 11\"), weight 91.6 kg (202 lb), SpO2 94%. Body mass index is 28.17 kg/m .    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 100 each 3    aspirin 81 MG EC tablet Take 1 tablet (81 mg) by mouth daily. 90 tablet 1    atorvastatin (LIPITOR) 80 MG tablet Take 1 tablet (80 mg) by mouth daily. 90 tablet 1    blood glucose (NO BRAND SPECIFIED) lancets standard Use to test blood sugar 3 times daily or as directed. 100 each 11    blood glucose (NO BRAND SPECIFIED) test strip 1 strip by In Vitro route daily. 500 strip 3    blood glucose (NO BRAND SPECIFIED) test strip Use to test blood sugar 2-4 times daily or as directed. 100 strip 11    blood glucose (NO BRAND SPECIFIED) test strip Use to test blood sugar 3 times daily or as directed. To accompany: Blood Glucose Monitor Brands: per insurance. 100 strip 6    blood glucose monitoring (NO BRAND SPECIFIED) meter device kit Use to test blood sugar 3 times daily or as directed. Preferred blood glucose meter OR supplies to " accompany: Blood Glucose Monitor Brands: per insurance. 1 kit 0    blood glucose monitoring (NO BRAND SPECIFIED) meter device kit Use to test blood sugar 2-4 times daily or as directed. 1 kit 1    BYDUREON BCISE 2 MG/0.85ML auto-injector INJECT 2MG UNDER THE SKIN EVERY 7 DAYS AS DIRECTED 3.4 mL 1    BYDUREON BCISE 2 MG/0.85ML auto-injector INJECT 2 MG SUBCUTANEOUS EVERY 7 DAYS 3.4 mL 3    Cetaphil Moisturizing (CETAPHIL) external lotion Apply topically as needed for dry skin 250 mL 11    cyanocobalamin (VITAMIN B-12) 500 MCG tablet Take 1 tablet (500 mcg) by mouth daily. 90 tablet 1    ferrous sulfate (FEROSUL) 325 (65 Fe) MG tablet Take 1 tablet (325 mg) by mouth daily (with breakfast). 90 tablet 0    folic acid (FOLVITE) 400 MCG tablet Take 1 tablet (400 mcg) by mouth daily. 90 tablet 1    insulin aspart (NOVOLOG FLEXPEN) 100 UNIT/ML pen 14 units before meals three times a day 30 mL 11    insulin glargine (LANTUS SOLOSTAR) 100 UNIT/ML pen INJECT 30 UNITS UNDER THE SKIN AT BEDTIME. 15 mL 2    insulin pen needle (B-D U/F) 31G X 8 MM miscellaneous Use 3 daily or as directed. 100 each prn    lisinopril (ZESTRIL) 5 MG tablet Take 1 tablet (5 mg) by mouth daily. 30 tablet 2    loratadine (CLARITIN) 10 MG capsule Take 10 mg by mouth daily 90 capsule 1    metFORMIN (GLUCOPHAGE XR) 500 MG 24 hr tablet Take 4 tablets (2,000 mg) by mouth daily. 120 tablet 2    nicotine (NICODERM CQ) 21 MG/24HR 24 hr patch Place 1 patch onto the skin every 24 hours 30 patch 1    nicotine (NICORETTE) 2 MG gum Place 1 each (2 mg) inside cheek every hour as needed for smoking cessation 50 each 1    nitroGLYcerin (NITROSTAT) 0.4 MG sublingual tablet Place 1 tablet (0.4 mg) under the tongue every 5 minutes as needed for chest pain 20 tablet 4    thin (NO BRAND SPECIFIED) lancets Use to test blood sugar 3 times daily or as directed. To accompany: Blood Glucose Monitor Brands: per insurance. 200 each 6    varenicline (CHANTIX ANNELIEES) 0.5 MG X 11 & 1 MG  X 42 tablet Take 0.5 mg tab daily for 3 days, THEN 0.5 mg tab twice daily for 4 days, THEN 1 mg twice daily. 53 tablet 1       Social History     Tobacco Use    Smoking status: Former     Current packs/day: 0.00     Types: Cigarettes     Quit date: 2019     Years since quittin.3    Smokeless tobacco: Never    Tobacco comments:     PT states he quit 2024   Vaping Use    Vaping status: Never Used   Substance Use Topics    Alcohol use: No    Drug use: No       What to expect after the procedure reviewed with patient: yes    Clyde Klein  2025  3:30 PM     Invasive Procedure Safety Checklist:    Procedure: cystoscopy    Action: Complete sections and checkboxes as appropriate.    Pre-procedure:  1. Patient ID Verified with 2 identifiers (Laura and  or MRN) : YES    2. Procedure and site verified with patient/designee (when able) : YES    3. Accurate consent documentation in medical record : YES    4. H&P (or appropriate assessment) documented in medical record : YES  H&P must be up to 30 days prior to procedure an updated within 24 hours of                 Procedure as applicable.     5. Relevant diagnostic and radiology test results appropriately labeled and displayed as applicable : YES    6. Blood products, implants, devices, and/or special equipment available for the procedure as applicable : YES    7. Procedure site(s) marked with provider initials [Exclusions: None] : NO    8. Marking not required. Reason : Yes  Procedure does not require site marking    Time Out:     Time-Out performed immediately prior to starting procedure, including verbal and active participation of all team members addressing: YES    1. Correct patient identity.  2. Confirmed that the correct side and site are marked.  3. An accurate procedure to be done.  4. Agreement on the procedure to be done.  5. Correct patient position.  6. Relevant images and results are properly labeled and appropriately displayed.  7. The  need to administer antibiotics or fluids for irrigation purposes during the procedure as applicable.  8. Safety precautions based on patient history or medication use.    During Procedure: Verification of correct person, site, and procedure occurs any time the responsibility for care of the patient is transferred to another member of the care team.      The following medication was given:     MEDICATION:  Uro-jet  ROUTE: Intra-urethral   SITE: Urethra  DOSE: 10 mL 2% lidocaine  LOT #: [id number]  : [employer]  EXPIRATION DATE: 12-26  NDC#: [address]-71   Was there drug waste? No    Prior to administration, verified patient identity using patient's name and date of birth.  Due to administration, patient instructed to remain in clinic for 15 minutes  afterwards, and to report any adverse reaction to me immediately.      Drug Amount Wasted:  None.  Vial/Syringe: Single dose vial    Clyde Klein  May 21, 2025

## 2025-05-21 NOTE — LETTER
5/21/2025       RE: Don Niño  [address]  [facility] [address]     Dear Colleague,    Thank you for referring your patient, Don Niño, to the [facility] [place] [facility]. Please see a copy of my visit note below.    HISTORY OF PRESENT ILLNESS:  Mr. Niño is a pleasant 60 year old male who I am seeing today for cystoscopy given his gross hematuria.  I met with him for this initially on 3/21/2025, at which time we discussed moving forward with a full gross hematuria workup as well as starting his prostate cancer screening with a PSA.    CT urogram completed on 3/29/2025 did not show any malignant etiology of his hematuria, though did discover a 1.3 cm pancreatic tail mass.  Follow-up MRI of the pancreas unfortunately still found this area to be indeterminant but was most likely IPMN, so plan is to follow-up with another MR pancreas in November.    Today  Mr. Niño did not initially think he was here for a cystoscopy and thought he was here for discussion of his CT urogram and pancreatic MRI results.  I sat with the patient for 10 minutes going over his results as well as the reason for his cystoscopy today and ultimately this did lead to him being comfortable with the cystoscopy.  This time with the patient going over results should be billed separately than the cystoscopy that was done    PROCEDURE:    Mr. Niño was brought to the cystoscopy suite and placed in the lithotomy position. he was prepped and draped in the standard fashion.  A FLEXIBLE CYSTOSCOPE was inserted through the urethra and into the bladder.  Urethroscopy was normal. The sphincter coapted normally.  The prostate exhibited moderate bilobar hypertrophy with evidence of the median lobe. The bladder was entered and inspected.  There were no diverticula, cellules, stones or foreign bodies noted.  Mild trabeculations were noted. The ureteral orifices  were identified in their orthotopic position bilaterally effluxing clear urine.  There were no areas of concerning erythema or papillary lesions. Retroflex view demonstrated moderate intravesical protrusion of the prostate with a moderately sized median lobe also intruding even further than this. The cystoscope was then removed with no evidence of active bleeding.  Mr. Niño tolerated the procedure well.     IMPRESSION:   Gross hematuria   BPH with moderate intravesical protrusion and obstruction with a moderate size median lobe  Pancreatic IPMN    It was my pleasure to speak with Mr. Niño alongside his wife and daughter first to go over the results of his CT urogram and pancreatic MRI as well as to complete a cystoscopy for his gross hematuria workup.  After reviewing his clinical history, I first discussed with him that his CT urogram luckily did not show any evidence of any concerning etiologies of his gross hematuria.  The most significant finding was that of the pancreatic tail lesion that the MRI revealed to be a likely IPMN.  With this in mind, we will look to get his MRI completed in roughly 6 months, get his PSA done in approximately 1 month with a follow-up with me as soon after that is possible, and finally look to get a repeat urinalysis in 1 year.    I was happy to let him know that his cystoscopy did not show any evidence of malignant etiology of his hematuria meaning we just need to get the above-mentioned urinalysis in 1 year.  I did specifically want a meet with him either virtually or in the office sometime soon to discuss the cystoscopic findings and be sure that from a urinary standpoint he is doing okay and go into more detail about BPH management options especially given his gross hematuria which is most likely due to his large obstructing prostate.    Mr. Niño expressed understanding and agreement to the above discussion and plan and all of his questions were answered to his satisfaction.      PLAN:   Pancreatic MRI in 6 months  PSA in 3-4 weeks with follow-up visit with Blair Rincon PA-C shortly afterward  Urinalysis in one year.    Signed by:    Blair Rincon PA-C  [facility]  [address] [place] [id number]     Again, thank you for allowing me to participate in the care of your patient.      Sincerely,    Blair Rincon PA-C

## 2025-06-18 DIAGNOSIS — Z79.4 TYPE 2 DIABETES MELLITUS WITH OTHER CIRCULATORY COMPLICATION, WITH LONG-TERM CURRENT USE OF INSULIN (H): ICD-10-CM

## 2025-06-18 DIAGNOSIS — E11.59 TYPE 2 DIABETES MELLITUS WITH OTHER CIRCULATORY COMPLICATION, WITH LONG-TERM CURRENT USE OF INSULIN (H): ICD-10-CM

## 2025-07-06 ENCOUNTER — HEALTH MAINTENANCE LETTER (OUTPATIENT)
Age: 60
End: 2025-07-06

## 2025-07-07 ENCOUNTER — OFFICE VISIT (OUTPATIENT)
Dept: FAMILY MEDICINE | Facility: CLINIC | Age: 60
End: 2025-07-07
Payer: COMMERCIAL

## 2025-07-07 VITALS
OXYGEN SATURATION: 98 % | RESPIRATION RATE: 16 BRPM | DIASTOLIC BLOOD PRESSURE: 76 MMHG | SYSTOLIC BLOOD PRESSURE: 125 MMHG | WEIGHT: 198 LBS | TEMPERATURE: 97.6 F | HEIGHT: 71 IN | HEART RATE: 92 BPM | BODY MASS INDEX: 27.72 KG/M2

## 2025-07-07 DIAGNOSIS — Z79.4 TYPE 2 DIABETES MELLITUS WITH OTHER CIRCULATORY COMPLICATION, WITH LONG-TERM CURRENT USE OF INSULIN (H): Primary | ICD-10-CM

## 2025-07-07 DIAGNOSIS — E11.59 TYPE 2 DIABETES MELLITUS WITH OTHER CIRCULATORY COMPLICATION, WITH LONG-TERM CURRENT USE OF INSULIN (H): Primary | ICD-10-CM

## 2025-07-07 LAB
EST. AVERAGE GLUCOSE BLD GHB EST-MCNC: 249 MG/DL
HBA1C MFR BLD: 10.3 % (ref 0–5.6)

## 2025-07-07 PROCEDURE — 3078F DIAST BP <80 MM HG: CPT

## 2025-07-07 PROCEDURE — 83036 HEMOGLOBIN GLYCOSYLATED A1C: CPT

## 2025-07-07 PROCEDURE — 3074F SYST BP LT 130 MM HG: CPT

## 2025-07-07 PROCEDURE — 99214 OFFICE O/P EST MOD 30 MIN: CPT | Mod: GC

## 2025-07-07 PROCEDURE — 3046F HEMOGLOBIN A1C LEVEL >9.0%: CPT

## 2025-07-07 PROCEDURE — 36415 COLL VENOUS BLD VENIPUNCTURE: CPT

## 2025-07-07 RX ORDER — INSULIN ASPART 100 [IU]/ML
INJECTION, SOLUTION INTRAVENOUS; SUBCUTANEOUS
Qty: 30 ML | Refills: 11 | Status: SHIPPED | OUTPATIENT
Start: 2025-07-07

## 2025-07-07 RX ORDER — INSULIN GLARGINE 100 [IU]/ML
INJECTION, SOLUTION SUBCUTANEOUS
Qty: 15 ML | Refills: 2 | Status: SHIPPED | OUTPATIENT
Start: 2025-07-07

## 2025-07-07 RX ORDER — LORATADINE 10 MG/1
10 CAPSULE, LIQUID FILLED ORAL DAILY
Qty: 90 CAPSULE | Refills: 1 | Status: CANCELLED | OUTPATIENT
Start: 2025-07-07

## 2025-07-07 NOTE — PROGRESS NOTES
"  Assessment & Plan     Type 2 diabetes mellitus with other circulatory complication, with long-term current use of insulin (H)  Uncontrolled. A1c elevated at 10.3. Up from 7.7 previously. Asymptomatic. Patient reports he has been adhering to his diabetes medication regimen. Discuss starting Jardiance for additional BG control. Patient agrees. Will start Jardiance 10 mg daily. Encourage continuing dieting and exercising. Plan to follow up in one month for DM recheck. Refill given.   - Hemoglobin A1c  - Adult Eye  Referral  - Hemoglobin A1c  - insulin aspart (NOVOLOG FLEXPEN) 100 UNIT/ML pen  Dispense: 30 mL; Refill: 11  - insulin glargine (LANTUS SOLOSTAR) 100 UNIT/ML pen  Dispense: 15 mL; Refill: 2  - empagliflozin (JARDIANCE) 10 MG TABS tablet  Dispense: 90 tablet; Refill: 0            BMI  Estimated body mass index is 27.62 kg/m  as calculated from the following:    Height as of this encounter: 1.803 m (5' 11\").    Weight as of this encounter: 89.8 kg (198 lb).       Subjective   Don is a 60 year old, presenting for the following health issues:  Clinic Care Coordination - Follow-up (Diabetes. Requesting labs )        2025    11:32 AM   Additional Questions   Roomed by Queenie   Accompanied by self         2025    Information    services provided? Yes   Language Thai    name Kya    Agency [employer]          Diabetes:   Home B-[address]  No symptoms of hyperglycemia  Never less than 60  Diet:home cook meals   exercise: 45 mins a day walking   Has not seen an eye provider   Want labs done       DM meds:   insulin aspart 14 units before meal TID  insulin glargine 30 unit(s) at bedtime   metFORMIN - MAX dosing         Review of Systems  Constitutional, HEENT, cardiovascular, pulmonary, gi and gu systems are negative, except as otherwise noted.      Objective    /76   Pulse 92   Temp 97.6  F (36.4  C) (Temporal)   Resp 16   Ht 1.803 m " "(5' 11\")   Wt 89.8 kg (198 lb)   SpO2 98%   BMI 27.62 kg/m    Body mass index is 27.62 kg/m .  Physical Exam   GENERAL: alert and no distress  RESP: lungs clear to auscultation - no rales, rhonchi or wheezes  CV: regular rate and rhythm,  no murmur, click or rub, no peripheral edema  ABDOMEN: soft, nontender, no hepatosplenomegaly, no masses and bowel sounds normal  NEURO:  mentation intact and speech normal  PSYCH: mentation appears normal, affect normal/bright  Diabetic foot exam: normal DP and PT pulses, no trophic changes or ulcerative lesions, and normal sensory exam          Signed Electronically by: Stephane Lopez MD    "

## 2025-07-07 NOTE — PATIENT INSTRUCTIONS
Patient Education   Here is the plan from today's visit    1. Type 2 diabetes mellitus with other circulatory complication, with long-term current use of insulin (H) (Primary)  Start taking Jardiance 19 mg daily. Schedule follow up in one month for DM recheck.   - Hemoglobin A1c; Future  - Adult Eye  Referral; Future  - Hemoglobin A1c  - insulin aspart (NOVOLOG FLEXPEN) 100 UNIT/ML pen; 14 units before meals three times a day  Dispense: 30 mL; Refill: 11  - insulin glargine (LANTUS SOLOSTAR) 100 UNIT/ML pen; INJECT 30 UNITS UNDER THE SKIN AT BEDTIME.  Dispense: 15 mL; Refill: 2  - empagliflozin (JARDIANCE) 10 MG TABS tablet; Take 1 tablet (10 mg) by mouth daily.  Dispense: 90 tablet; Refill: 0          Please call or return to clinic if your symptoms don't go away.    Follow up plan  Return in about 1 month (around 8/7/2025) for Follow up DM.    Thank you for coming to [facility] today.  Lab Testing:  **If you had lab testing today and your results are reassuring or normal they will be mailed to you or sent through [employer] within 7 days.   **If the lab tests need quick action we will call you with the results.  **If you are having labs done on a different day, please call [phone] to schedule at [facility] or [phone] for other [facility] locations. Labs do not offer walk-in appointments.  The phone number we will call with results is # [phone] (home) [phone] (work). If this is not the best number please call our clinic and change the number.  Medication Refills:  If you need any refills please call your pharmacy and they will contact us.   If you need to  your refill at a new pharmacy, please contact the new pharmacy directly. The new pharmacy will help you get your medications transferred faster.   Scheduling:  If you have any concerns about today's visit or wish to schedule another appointment please call our office during normal business hours  [phone] (8-5:00 M-F). If you can no longer make a scheduled visit, please cancel via [employer] or call us to cancel.   If a referral was made to an [facility] specialty provider and you do not get a call from central scheduling, please refer to directions on your visit summary or call our office during normal business hours for assistance.   If a Mammogram was ordered for you at the [facility] Center call [phone] to schedule or change your appointment.  If you had an XRay/CT/Ultrasound/MRI ordered the number is [phone] to schedule or change your radiology appointment.   [facility] has limited ultrasound appointments available on Wednesdays, if you would like your ultrasound at [facility], please call [phone] to schedule.   Medical Concerns:  If you have urgent medical concerns please call [phone] at any time of the day.    Stephane Lopez MD

## 2025-07-08 ENCOUNTER — PATIENT OUTREACH (OUTPATIENT)
Dept: CARE COORDINATION | Facility: CLINIC | Age: 60
End: 2025-07-08
Payer: COMMERCIAL

## 2025-07-08 DIAGNOSIS — I10 ESSENTIAL HYPERTENSION WITH GOAL BLOOD PRESSURE LESS THAN 140/90: ICD-10-CM

## 2025-07-08 DIAGNOSIS — E11.59 TYPE 2 DIABETES MELLITUS WITH OTHER CIRCULATORY COMPLICATION, WITH LONG-TERM CURRENT USE OF INSULIN (H): ICD-10-CM

## 2025-07-08 DIAGNOSIS — Z79.4 TYPE 2 DIABETES MELLITUS WITH OTHER CIRCULATORY COMPLICATION, WITH LONG-TERM CURRENT USE OF INSULIN (H): ICD-10-CM

## 2025-07-08 RX ORDER — METFORMIN HYDROCHLORIDE 500 MG/1
2000 TABLET, EXTENDED RELEASE ORAL DAILY
Qty: 120 TABLET | Refills: 2 | Status: SHIPPED | OUTPATIENT
Start: 2025-07-08

## 2025-07-08 RX ORDER — LISINOPRIL 5 MG/1
5 TABLET ORAL DAILY
Qty: 90 TABLET | Refills: 2 | Status: SHIPPED | OUTPATIENT
Start: 2025-07-08

## 2025-07-08 NOTE — TELEPHONE ENCOUNTER
"Request for medication refill:    Medication Name:     metFORMIN (GLUCOPHAGE XR) 500 MG 24 hr tablet     lisinopril (ZESTRIL) 5 MG tablet     Providers if patient needs an appointment and you are willing to give a one month supply please refill for one month and  send a MyChart using \".SMILLIMITEDREFILL\" .Or route chart to \"P [facility] \" . And use the phrase \" SMIRXFOLLOWUP\"To call patient and inform of limited refill and providers request to make an appointment. (Giving one month refill in non controlled medications is strongly recommended before denial)    If refill has been denied, meaning absolutely no refills without visit, please complete the smart phrase \".SMIRXREFUSE\" and route it to the \"P [facility] MED REFILLS\"  pool to inform the patient and the pharmacy.    Pamela Pride MA     "

## 2025-07-10 ENCOUNTER — PATIENT OUTREACH (OUTPATIENT)
Dept: CARE COORDINATION | Facility: CLINIC | Age: 60
End: 2025-07-10
Payer: COMMERCIAL

## 2025-07-21 ENCOUNTER — TELEPHONE (OUTPATIENT)
Dept: UROLOGY | Facility: CLINIC | Age: 60
End: 2025-07-21
Payer: COMMERCIAL

## 2025-07-21 NOTE — TELEPHONE ENCOUNTER
Clinic coordinators, it appears that this patient has not yet completed the PSA either requested they complete before our follow-up on Wednesday.  Would you please assist me in contacting this patient to have him get a PSA drawn today or tomorrow?  If he is unable to do so, we will need to reschedule his visit with me to a time after a PSA can be drawn.  Thank you!

## 2025-07-23 ENCOUNTER — TELEPHONE (OUTPATIENT)
Dept: UROLOGY | Facility: CLINIC | Age: 60
End: 2025-07-23

## 2025-08-10 DIAGNOSIS — E61.1 IRON DEFICIENCY: ICD-10-CM

## 2025-08-11 RX ORDER — FERROUS SULFATE 325(65) MG
325 TABLET ORAL
Qty: 90 TABLET | Refills: 0 | Status: SHIPPED | OUTPATIENT
Start: 2025-08-11

## (undated) RX ORDER — LIDOCAINE HYDROCHLORIDE 20 MG/ML
JELLY TOPICAL
Status: DISPENSED
Start: 2025-05-21